# Patient Record
Sex: FEMALE | Race: WHITE | Employment: UNEMPLOYED | URBAN - METROPOLITAN AREA
[De-identification: names, ages, dates, MRNs, and addresses within clinical notes are randomized per-mention and may not be internally consistent; named-entity substitution may affect disease eponyms.]

---

## 2017-03-10 ENCOUNTER — HOSPITAL ENCOUNTER (OUTPATIENT)
Age: 82
Setting detail: OBSERVATION
Discharge: HOME HEALTH | End: 2017-03-13
Attending: EMERGENCY MEDICINE | Admitting: INTERNAL MEDICINE
Payer: MEDICARE

## 2017-03-10 ENCOUNTER — APPOINTMENT (OUTPATIENT)
Dept: GENERAL RADIOLOGY | Age: 82
End: 2017-03-10
Attending: EMERGENCY MEDICINE
Payer: MEDICARE

## 2017-03-10 DIAGNOSIS — R07.9 CHEST PAIN, UNSPECIFIED TYPE: Primary | ICD-10-CM

## 2017-03-10 DIAGNOSIS — I10 ACCELERATED HYPERTENSION: ICD-10-CM

## 2017-03-10 DIAGNOSIS — R73.9 HYPERGLYCEMIA WITHOUT KETOSIS: ICD-10-CM

## 2017-03-10 DIAGNOSIS — Z86.79 HISTORY OF CHF (CONGESTIVE HEART FAILURE): ICD-10-CM

## 2017-03-10 DIAGNOSIS — N28.9 RENAL INSUFFICIENCY: ICD-10-CM

## 2017-03-10 LAB
ALBUMIN SERPL BCP-MCNC: 3.7 G/DL (ref 3.4–5)
ALBUMIN/GLOB SERPL: 1 {RATIO} (ref 0.8–1.7)
ALP SERPL-CCNC: 105 U/L (ref 45–117)
ALT SERPL-CCNC: 20 U/L (ref 13–56)
ANION GAP BLD CALC-SCNC: 10 MMOL/L (ref 3–18)
APPEARANCE UR: CLEAR
APTT PPP: 27.1 SEC (ref 23–36.4)
AST SERPL W P-5'-P-CCNC: 14 U/L (ref 15–37)
BACTERIA URNS QL MICRO: ABNORMAL /HPF
BASOPHILS # BLD AUTO: 0.1 K/UL (ref 0–0.06)
BASOPHILS # BLD: 1 % (ref 0–2)
BILIRUB SERPL-MCNC: 0.3 MG/DL (ref 0.2–1)
BILIRUB UR QL: NEGATIVE
BNP SERPL-MCNC: 684 PG/ML (ref 0–1800)
BUN SERPL-MCNC: 39 MG/DL (ref 7–18)
BUN/CREAT SERPL: 24 (ref 12–20)
CALCIUM SERPL-MCNC: 10.1 MG/DL (ref 8.5–10.1)
CHLORIDE SERPL-SCNC: 98 MMOL/L (ref 100–108)
CK MB CFR SERPL CALC: 1.6 % (ref 0–4)
CK MB SERPL-MCNC: 1.5 NG/ML (ref 5–25)
CK SERPL-CCNC: 94 U/L (ref 26–192)
CO2 SERPL-SCNC: 28 MMOL/L (ref 21–32)
COLOR UR: YELLOW
CREAT SERPL-MCNC: 1.65 MG/DL (ref 0.6–1.3)
DIFFERENTIAL METHOD BLD: ABNORMAL
EOSINOPHIL # BLD: 0.4 K/UL (ref 0–0.4)
EOSINOPHIL NFR BLD: 3 % (ref 0–5)
EPITH CASTS URNS QL MICRO: ABNORMAL /LPF (ref 0–5)
ERYTHROCYTE [DISTWIDTH] IN BLOOD BY AUTOMATED COUNT: 16.6 % (ref 11.6–14.5)
EST. AVERAGE GLUCOSE BLD GHB EST-MCNC: 177 MG/DL
GLOBULIN SER CALC-MCNC: 3.7 G/DL (ref 2–4)
GLUCOSE SERPL-MCNC: 251 MG/DL (ref 74–99)
GLUCOSE UR STRIP.AUTO-MCNC: NEGATIVE MG/DL
HBA1C MFR BLD: 7.8 % (ref 4.5–5.6)
HCT VFR BLD AUTO: 39.1 % (ref 35–45)
HGB BLD-MCNC: 12.5 G/DL (ref 12–16)
HGB UR QL STRIP: NEGATIVE
INR PPP: 0.9 (ref 0.8–1.2)
KETONES UR QL STRIP.AUTO: NEGATIVE MG/DL
LEUKOCYTE ESTERASE UR QL STRIP.AUTO: ABNORMAL
LIPASE SERPL-CCNC: 77 U/L (ref 73–393)
LYMPHOCYTES # BLD AUTO: 14 % (ref 21–52)
LYMPHOCYTES # BLD: 1.9 K/UL (ref 0.9–3.6)
MCH RBC QN AUTO: 24.3 PG (ref 24–34)
MCHC RBC AUTO-ENTMCNC: 32 G/DL (ref 31–37)
MCV RBC AUTO: 75.9 FL (ref 74–97)
MONOCYTES # BLD: 1.2 K/UL (ref 0.05–1.2)
MONOCYTES NFR BLD AUTO: 8 % (ref 3–10)
MUCOUS THREADS URNS QL MICRO: NEGATIVE /LPF
NEUTS SEG # BLD: 10.5 K/UL (ref 1.8–8)
NEUTS SEG NFR BLD AUTO: 74 % (ref 40–73)
NITRITE UR QL STRIP.AUTO: NEGATIVE
PH UR STRIP: 6 [PH] (ref 5–8)
PLATELET # BLD AUTO: 562 K/UL (ref 135–420)
PMV BLD AUTO: 9.3 FL (ref 9.2–11.8)
POTASSIUM SERPL-SCNC: 5 MMOL/L (ref 3.5–5.5)
PROT SERPL-MCNC: 7.4 G/DL (ref 6.4–8.2)
PROT UR STRIP-MCNC: 30 MG/DL
PROTHROMBIN TIME: 12 SEC (ref 11.5–15.2)
RBC # BLD AUTO: 5.15 M/UL (ref 4.2–5.3)
RBC #/AREA URNS HPF: NEGATIVE /HPF (ref 0–5)
SODIUM SERPL-SCNC: 136 MMOL/L (ref 136–145)
SP GR UR REFRACTOMETRY: 1.01 (ref 1–1.03)
TROPONIN I SERPL-MCNC: <0.02 NG/ML (ref 0–0.06)
UROBILINOGEN UR QL STRIP.AUTO: 0.2 EU/DL (ref 0.2–1)
WBC # BLD AUTO: 14 K/UL (ref 4.6–13.2)
WBC URNS QL MICRO: ABNORMAL /HPF (ref 0–5)

## 2017-03-10 PROCEDURE — 83690 ASSAY OF LIPASE: CPT | Performed by: EMERGENCY MEDICINE

## 2017-03-10 PROCEDURE — 83036 HEMOGLOBIN GLYCOSYLATED A1C: CPT | Performed by: PHYSICIAN ASSISTANT

## 2017-03-10 PROCEDURE — 74011250637 HC RX REV CODE- 250/637: Performed by: PHYSICIAN ASSISTANT

## 2017-03-10 PROCEDURE — 96375 TX/PRO/DX INJ NEW DRUG ADDON: CPT

## 2017-03-10 PROCEDURE — 85610 PROTHROMBIN TIME: CPT | Performed by: EMERGENCY MEDICINE

## 2017-03-10 PROCEDURE — 93005 ELECTROCARDIOGRAM TRACING: CPT

## 2017-03-10 PROCEDURE — 96372 THER/PROPH/DIAG INJ SC/IM: CPT

## 2017-03-10 PROCEDURE — 94640 AIRWAY INHALATION TREATMENT: CPT

## 2017-03-10 PROCEDURE — 80053 COMPREHEN METABOLIC PANEL: CPT | Performed by: EMERGENCY MEDICINE

## 2017-03-10 PROCEDURE — 74011000250 HC RX REV CODE- 250: Performed by: PHYSICIAN ASSISTANT

## 2017-03-10 PROCEDURE — 96365 THER/PROPH/DIAG IV INF INIT: CPT

## 2017-03-10 PROCEDURE — 82550 ASSAY OF CK (CPK): CPT | Performed by: EMERGENCY MEDICINE

## 2017-03-10 PROCEDURE — 85730 THROMBOPLASTIN TIME PARTIAL: CPT | Performed by: EMERGENCY MEDICINE

## 2017-03-10 PROCEDURE — 85025 COMPLETE CBC W/AUTO DIFF WBC: CPT | Performed by: EMERGENCY MEDICINE

## 2017-03-10 PROCEDURE — 99285 EMERGENCY DEPT VISIT HI MDM: CPT

## 2017-03-10 PROCEDURE — 83880 ASSAY OF NATRIURETIC PEPTIDE: CPT | Performed by: EMERGENCY MEDICINE

## 2017-03-10 PROCEDURE — 81001 URINALYSIS AUTO W/SCOPE: CPT | Performed by: PHYSICIAN ASSISTANT

## 2017-03-10 PROCEDURE — 71010 XR CHEST PORT: CPT

## 2017-03-10 PROCEDURE — 96376 TX/PRO/DX INJ SAME DRUG ADON: CPT

## 2017-03-10 RX ORDER — MICONAZOLE NITRATE 2 %
1 CREAM WITH APPLICATOR VAGINAL
COMMUNITY

## 2017-03-10 RX ORDER — DIPHENHYDRAMINE HCL 25 MG
25 TABLET ORAL
COMMUNITY
End: 2017-09-20

## 2017-03-10 RX ORDER — LOPERAMIDE HCL 2 MG
2 TABLET ORAL DAILY
COMMUNITY
End: 2017-09-20

## 2017-03-10 RX ORDER — LANOLIN ALCOHOL/MO/W.PET/CERES
3 CREAM (GRAM) TOPICAL
COMMUNITY

## 2017-03-10 RX ORDER — CLOTRIMAZOLE AND BETAMETHASONE DIPROPIONATE 10; .5 MG/ML; MG/ML
LOTION TOPICAL 2 TIMES DAILY
COMMUNITY

## 2017-03-10 RX ORDER — HYDRALAZINE HYDROCHLORIDE 20 MG/ML
10 INJECTION INTRAMUSCULAR; INTRAVENOUS
Status: DISCONTINUED | OUTPATIENT
Start: 2017-03-10 | End: 2017-03-10 | Stop reason: CLARIF

## 2017-03-10 RX ORDER — CALCIUM CARBONATE 200(500)MG
2 TABLET,CHEWABLE ORAL 3 TIMES DAILY
COMMUNITY

## 2017-03-10 RX ORDER — IPRATROPIUM BROMIDE AND ALBUTEROL SULFATE 2.5; .5 MG/3ML; MG/3ML
3 SOLUTION RESPIRATORY (INHALATION)
Status: COMPLETED | OUTPATIENT
Start: 2017-03-10 | End: 2017-03-10

## 2017-03-10 RX ADMIN — NITROGLYCERIN 1 INCH: 20 OINTMENT TOPICAL at 23:53

## 2017-03-10 RX ADMIN — IPRATROPIUM BROMIDE AND ALBUTEROL SULFATE 3 ML: .5; 3 SOLUTION RESPIRATORY (INHALATION) at 23:53

## 2017-03-10 NOTE — IP AVS SNAPSHOT
[facility] 
 
 
 [address] [address] 
[phone] Patient: Sonny Loja MRN: [id number] PFO:[id number] You are allergic to the following No active allergies Recent Documentation Height Weight Breastfeeding? BMI OB Status Smoking Status 1.6 m 88.5 kg No 34.54 kg/m2 Hysterectomy Former Smoker Emergency Contacts Name Discharge Info Relation Home Work Mobile Elyssa Mata N/A  AT THIS TIME [6] Other Relative [6] [phone] About your hospitalization You were admitted on:  March 11, 2017 You last received care in the:  [address] [place] [address] You were discharged on:  March 13, 2017 Unit phone number:  [phone] Why you were hospitalized Your primary diagnosis was:  Not on File Your diagnoses also included:  Chest Pain, Unspecified, Shortness Of Breath, Angina Pectoris (Hcc), Cad (Coronary Artery Disease), Htn (Hypertension), Chf (Congestive Heart Failure) (Hcc), Hypertensive Urgency, Dm (Diabetes Mellitus) (Hcc), Elevated Wbc Count, Ckd (Chronic Kidney Disease) Stage 3, Gfr 30-59 Ml/Min, Ckd (Chronic Kidney Disease) Stage 4, Gfr 15-29 Ml/Min (Hcc) Providers Seen During Your Hospitalizations Provider Role Specialty Primary office phone Robi Herrera MD Attending Provider Emergency Medicine [phone] Chato Galarza MD Attending Provider Internal Medicine [phone] Your Primary Care Physician (PCP) Primary Care Physician Office Phone Office Fax Saman Doom [phone] Follow-up Information Follow up With Details Comments Contact Info Nephrologist Schedule an appointment as soon as possible for a visit in 1 week Sudhir Patel MD In 3 days review renal function and use of diovan again please [address] [facility] [address] [facility] [address] [address] [phone] [place] of [address] [place] [address] Living to follow-up with physician appts. [address]  [facility] [address] [phone] [facility] Current Discharge Medication List  
  
CONTINUE these medications which have CHANGED Dose & Instructions Dispensing Information Comments Morning Noon Evening Bedtime  
 cloNIDine HCl 0.2 mg tablet Commonly known as:  CATAPRES What changed:  when to take this Dose:  0.2 mg Take 1 Tab by mouth two (2) times a day. Quantity:  40 Tab Refills:  0  
     
   
   
  
   
  
 metoprolol tartrate 50 mg tablet Commonly known as:  LOPRESSOR What changed:  how much to take Dose:  25 mg Take 0.5 Tabs by mouth two (2) times a day. Quantity:  10 Tab Refills:  0 NOVOLOG SC What changed:  Another medication with the same name was removed. Continue taking this medication, and follow the directions you see here. Dose:  12 Units 12 Units by SubCUTAneous route three (3) times daily (with meals). Indications: breakfast and lunch Refills:  0 CONTINUE these medications which have NOT CHANGED Dose & Instructions Dispensing Information Comments Morning Noon Evening Bedtime  
 acetaminophen 325 mg tablet Commonly known as:  TYLENOL Dose:  650 mg Take 650 mg by mouth three (3) times daily as needed for Pain. Refills:  0  
     
   
   
   
  
 aspirin 81 mg tablet Dose:  81 mg Take 81 mg by mouth daily. Refills:  0  
     
   
   
   
  
 HODAN-GEST ANTACID 200 mg calcium (500 mg) Chew Generic drug:  calcium carbonate Dose:  2 Tab Take 2 Tabs by mouth three (3) times daily. Refills:  0  
     
   
   
   
  
 clotrimazole-betamethasone 1-0.05 % lotion Commonly known as:  Randi Lancaster Apply  to affected area two (2) times a day. Refills:  0 diphenhydrAMINE 25 mg tablet Commonly known as:  BENADRYL Dose:  25 mg Take 25 mg by mouth every six (6) hours as needed for Sleep. Refills:  0  
     
   
   
   
  
 furosemide 40 mg tablet Commonly known as:  LASIX Dose:  20 mg Take 0.5 Tabs by mouth daily. Quantity:  1 Tab Refills:  0 IMDUR 30 mg tablet Generic drug:  isosorbide mononitrate ER Dose:  30 mg Take 30 mg by mouth two (2) times a day. Refills:  0  
     
   
   
   
  
 LANTUS SC Dose:  55 Units 55 Units by SubCUTAneous route as directed. Refills:  0  
     
   
   
   
  
 levothyroxine 125 mcg tablet Commonly known as:  SYNTHROID Dose:  150 mcg Take 150 mcg by mouth Daily (before breakfast). Refills:  0 LIPITOR 20 mg tablet Generic drug:  atorvastatin Take  by mouth daily. Refills:  0  
     
   
   
   
  
 loperamide 2 mg tablet Commonly known as:  IMMODIUM Dose:  2 mg Take 2 mg by mouth daily. Refills:  0 LORazepam 0.5 mg tablet Commonly known as:  ATIVAN Dose:  0.5 mg Take 1 Tab by mouth every eight (8) hours as needed for Anxiety. Max Daily Amount: 1.5 mg.  
 Quantity:  20 Tab Refills:  0  
     
   
   
   
  
 magnesium oxide 400 mg tablet Commonly known as:  MAG-OX Dose:  400 mg Take 400 mg by mouth daily. Refills:  0  
     
   
   
   
  
 meclizine 12.5 mg tablet Commonly known as:  ANTIVERT Dose:  12.5 mg Take 12.5 mg by mouth four (4) times daily as needed. Refills:  0  
     
   
   
   
  
 melatonin 3 mg tablet Dose:  3 mg Take 3 mg by mouth. Refills:  0 MICONAZOLE 7 2 % vaginal cream  
Generic drug:  miconazole Dose:  1 Applicator Insert 1 Applicator into vagina nightly. Refills:  0 NAPHCON-A 0.025-0.3 % ophthalmic solution Generic drug:  naphazoline-pheniramine Administer  to both eyes. Refills:  0  
     
   
   
   
  
 NITROQUICK 0.4 mg SL tablet Generic drug:  nitroglycerin Dose:  0.4 mg  
0.4 mg by SubLINGual route every five (5) minutes as needed. Refills:  0 NORVASC 5 mg tablet Generic drug:  amLODIPine Dose:  10 mg Take 10 mg by mouth daily. Refills:  0  
     
   
   
   
  
 pitavastatin 2 mg tablet Commonly known as:  LIVALO Dose:  1 mg Take 1 mg by mouth daily. Refills:  0 PLAVIX 75 mg Tab Generic drug:  clopidogrel Dose:  75 mg Take 75 mg by mouth daily. Refills:  0  
     
   
   
   
  
 polyethylene glycol 17 gram/dose powder Commonly known as:  Duquesne Hallmark Dose:  17 g Take 17 g by mouth as needed. Refills:  0  
     
   
   
   
  
 promethazine 6.25 mg/5 mL syrup Commonly known as:  PHENERGAN Dose:  6.25 mg Take 6.25 mg by mouth four (4) times daily as needed for Nausea. Refills:  0 PROTONIX 40 mg tablet Generic drug:  pantoprazole Dose:  40 mg Take 40 mg by mouth daily. Refills:  0  
     
   
   
   
  
 traMADol 50 mg tablet Commonly known as:  ULTRAM  
   
 Dose:  50 mg Take 50 mg by mouth every six (6) hours as needed for Pain. Refills:  0  
     
   
   
   
  
 VITAMIN B-6 50 mg tablet Generic drug:  pyridoxine (vitamin B6) Dose:  50 mg Take 50 mg by mouth daily. Refills:  0  
     
   
   
   
  
 VITAMIN D3 1,000 unit tablet Generic drug:  cholecalciferol Dose:  1000 Units Take 1,000 Units by mouth daily. Refills:  0 STOP taking these medications   
 hydroCHLOROthiazide 25 mg tablet Commonly known as:  HYDRODIURIL  
   
  
 insulin lispro 100 unit/mL injection Commonly known as:  HUMALOG  
   
  
 potassium chloride 20 mEq tablet Commonly known as:  K-DUR, BERNARDOR-CON  
   
 valsartan 320 mg tablet Commonly known as:  DIOVAN Where to Get Your Medications Information on where to get these meds will be given to you by the nurse or doctor. ! Ask your nurse or doctor about these medications  
  furosemide 40 mg tablet  
 metoprolol tartrate 50 mg tablet Discharge Instructions DISCHARGE SUMMARY from Nurse The following personal items are in your possession at time of discharge: 
 
Dental Appliances: Uppers Visual Aid: None Home Medications: None Jewelry: None Clothing: At bedside Other Valuables:  (bag) PATIENT INSTRUCTIONS: 
 
 
F-face looks uneven A-arms unable to move or move unevenly S-speech slurred or non-existent T-time-call 911 as soon as signs and symptoms begin-DO NOT go Back to bed or wait to see if you get better-TIME IS BRAIN. Warning Signs of HEART ATTACK Call 911 if you have these symptoms: 
? Chest discomfort. Most heart attacks involve discomfort in the center of the chest that lasts more than a few minutes, or that goes away and comes back. It can feel like uncomfortable pressure, squeezing, fullness, or pain. ? Discomfort in other areas of the upper body. Symptoms can include pain or discomfort in one or both arms, the back, neck, jaw, or stomach. ? Shortness of breath with or without chest discomfort. ? Other signs may include breaking out in a cold sweat, nausea, or lightheadedness. Don't wait more than five minutes to call [address]! Fast action can save your life. Calling 911 is almost always the fastest way to get lifesaving treatment. Emergency Medical Services staff can begin treatment when they arrive  up to an hour sooner than if someone gets to the hospital by car. The discharge information has been reviewed with the patient.   The patient verbalized understanding. Discharge medications reviewed with the patient and appropriate educational materials and side effects teaching were provided. Patient armband removed and shredded Discharge Orders None [employer] Announcement We are excited to announce that we are making your provider's discharge notes available to you in [employer]. You will see these notes when they are completed and signed by the physician that discharged you from your recent hospital stay. If you have any questions or concerns about any information you see in [employer], please call the Health Information Department where you were seen or reach out to your Primary Care Provider for more information about your plan of care. Introducing \[facility] [place] [facility]\"" & HEALTH SERVICES! Bobbi Canas introduces [employer] patient portal. Now you can access parts of your medical record, email your doctor's office, and request medication refills online. 1. In your internet browser, go to [email or web address] 2. Click on the First Time User? Click Here link in the Sign In box. You will see the New Member Sign Up page. 3. Enter your [employer] Access Code exactly as it appears below. You will not need to use this code after youve completed the sign-up process. If you do not sign up before the expiration date, you must request a new code. · [employer] Access Code: [email or web address] Expires: 6/8/2017 11:42 PM 
 
4. Enter the last four digits of your Social Security Number (xxxx) and Date of Birth (mm/dd/yyyy) as indicated and click Submit. You will be taken to the next sign-up page. 5. Create a AquaB[employer] ID. This will be your [employer] login ID and cannot be changed, so think of one that is secure and easy to remember. 6. Create a [employer] password. You can change your password at any time. 7. Enter your Password Reset Question and Answer. This can be used at a later time if you forget your password. 8. Enter your e-mail address. You will receive e-mail notification when new information is available in [address] 9. Click Sign Up. You can now view and download portions of your medical record. 10. Click the Download Summary menu link to download a portable copy of your medical information. If you have questions, please visit the Frequently Asked Questions section of the [employer] website. Remember, [employer] is NOT to be used for urgent needs. For medical emergencies, dial 911. Now available from your iPhone and Android! General Information Please provide this summary of care documentation to your next provider. Patient Signature:  ____________________________________________________________ Date:  ____________________________________________________________  
  
Calixto Marcie Provider Signature:  ____________________________________________________________ Date:  ____________________________________________________________

## 2017-03-11 PROBLEM — N18.30 CKD (CHRONIC KIDNEY DISEASE) STAGE 3, GFR 30-59 ML/MIN (HCC): Status: ACTIVE | Noted: 2017-03-11

## 2017-03-11 LAB
CK MB CFR SERPL CALC: 1 % (ref 0–4)
CK MB CFR SERPL CALC: 1.1 % (ref 0–4)
CK MB CFR SERPL CALC: 1.6 % (ref 0–4)
CK MB SERPL-MCNC: 1.4 NG/ML (ref 5–25)
CK MB SERPL-MCNC: 1.6 NG/ML (ref 5–25)
CK MB SERPL-MCNC: 1.9 NG/ML (ref 5–25)
CK SERPL-CCNC: 102 U/L (ref 26–192)
CK SERPL-CCNC: 128 U/L (ref 26–192)
CK SERPL-CCNC: 183 U/L (ref 26–192)
GLUCOSE BLD STRIP.AUTO-MCNC: 134 MG/DL (ref 70–110)
GLUCOSE BLD STRIP.AUTO-MCNC: 154 MG/DL (ref 70–110)
GLUCOSE BLD STRIP.AUTO-MCNC: 183 MG/DL (ref 70–110)
GLUCOSE BLD STRIP.AUTO-MCNC: 213 MG/DL (ref 70–110)
TROPONIN I SERPL-MCNC: 0.02 NG/ML (ref 0–0.06)
TROPONIN I SERPL-MCNC: 0.03 NG/ML (ref 0–0.06)
TROPONIN I SERPL-MCNC: 0.12 NG/ML (ref 0–0.06)

## 2017-03-11 PROCEDURE — 74011250636 HC RX REV CODE- 250/636: Performed by: PHYSICIAN ASSISTANT

## 2017-03-11 PROCEDURE — 77030013140 HC MSK NEB VYRM -A

## 2017-03-11 PROCEDURE — 96376 TX/PRO/DX INJ SAME DRUG ADON: CPT

## 2017-03-11 PROCEDURE — 74011250636 HC RX REV CODE- 250/636: Performed by: INTERNAL MEDICINE

## 2017-03-11 PROCEDURE — 99285 EMERGENCY DEPT VISIT HI MDM: CPT

## 2017-03-11 PROCEDURE — 51798 US URINE CAPACITY MEASURE: CPT

## 2017-03-11 PROCEDURE — 84443 ASSAY THYROID STIM HORMONE: CPT | Performed by: HOSPITALIST

## 2017-03-11 PROCEDURE — 96372 THER/PROPH/DIAG INJ SC/IM: CPT

## 2017-03-11 PROCEDURE — 74011250636 HC RX REV CODE- 250/636: Performed by: HOSPITALIST

## 2017-03-11 PROCEDURE — 93005 ELECTROCARDIOGRAM TRACING: CPT

## 2017-03-11 PROCEDURE — 99218 HC RM OBSERVATION: CPT

## 2017-03-11 PROCEDURE — 74011000250 HC RX REV CODE- 250: Performed by: PHYSICIAN ASSISTANT

## 2017-03-11 PROCEDURE — 93306 TTE W/DOPPLER COMPLETE: CPT

## 2017-03-11 PROCEDURE — 96365 THER/PROPH/DIAG IV INF INIT: CPT

## 2017-03-11 PROCEDURE — 74011250637 HC RX REV CODE- 250/637: Performed by: INTERNAL MEDICINE

## 2017-03-11 PROCEDURE — 36415 COLL VENOUS BLD VENIPUNCTURE: CPT | Performed by: INTERNAL MEDICINE

## 2017-03-11 PROCEDURE — 96375 TX/PRO/DX INJ NEW DRUG ADDON: CPT

## 2017-03-11 PROCEDURE — 82550 ASSAY OF CK (CPK): CPT | Performed by: PHYSICIAN ASSISTANT

## 2017-03-11 PROCEDURE — 74011000258 HC RX REV CODE- 258: Performed by: HOSPITALIST

## 2017-03-11 PROCEDURE — 82962 GLUCOSE BLOOD TEST: CPT

## 2017-03-11 PROCEDURE — C9113 INJ PANTOPRAZOLE SODIUM, VIA: HCPCS | Performed by: PHYSICIAN ASSISTANT

## 2017-03-11 RX ORDER — INSULIN ASPART 100 [IU]/ML
15 INJECTION, SOLUTION INTRAVENOUS; SUBCUTANEOUS
Status: DISCONTINUED | OUTPATIENT
Start: 2017-03-11 | End: 2017-03-11

## 2017-03-11 RX ORDER — LANOLIN ALCOHOL/MO/W.PET/CERES
50 CREAM (GRAM) TOPICAL DAILY
Status: DISCONTINUED | OUTPATIENT
Start: 2017-03-11 | End: 2017-03-13 | Stop reason: HOSPADM

## 2017-03-11 RX ORDER — POLYETHYLENE GLYCOL 3350 17 G/17G
17 POWDER, FOR SOLUTION ORAL DAILY
Status: DISCONTINUED | OUTPATIENT
Start: 2017-03-11 | End: 2017-03-13 | Stop reason: HOSPADM

## 2017-03-11 RX ORDER — POTASSIUM CHLORIDE 20 MEQ/1
20 TABLET, EXTENDED RELEASE ORAL 2 TIMES DAILY
Status: DISCONTINUED | OUTPATIENT
Start: 2017-03-11 | End: 2017-03-12

## 2017-03-11 RX ORDER — DEXTROSE 50 % IN WATER (D50W) INTRAVENOUS SYRINGE
25-50 AS NEEDED
Status: DISCONTINUED | OUTPATIENT
Start: 2017-03-11 | End: 2017-03-13 | Stop reason: HOSPADM

## 2017-03-11 RX ORDER — PROCHLORPERAZINE EDISYLATE 5 MG/ML
5 INJECTION INTRAMUSCULAR; INTRAVENOUS
Status: DISCONTINUED | OUTPATIENT
Start: 2017-03-11 | End: 2017-03-13 | Stop reason: HOSPADM

## 2017-03-11 RX ORDER — MAGNESIUM SULFATE 100 %
16 CRYSTALS MISCELLANEOUS AS NEEDED
Status: DISCONTINUED | OUTPATIENT
Start: 2017-03-11 | End: 2017-03-13 | Stop reason: HOSPADM

## 2017-03-11 RX ORDER — LANOLIN ALCOHOL/MO/W.PET/CERES
400 CREAM (GRAM) TOPICAL DAILY
Status: DISCONTINUED | OUTPATIENT
Start: 2017-03-11 | End: 2017-03-13 | Stop reason: HOSPADM

## 2017-03-11 RX ORDER — MECLIZINE HCL 12.5 MG 12.5 MG/1
12.5 TABLET ORAL
Status: DISCONTINUED | OUTPATIENT
Start: 2017-03-11 | End: 2017-03-13 | Stop reason: HOSPADM

## 2017-03-11 RX ORDER — LORAZEPAM 0.5 MG/1
0.5 TABLET ORAL
Status: DISCONTINUED | OUTPATIENT
Start: 2017-03-11 | End: 2017-03-11

## 2017-03-11 RX ORDER — LEVOTHYROXINE SODIUM 150 UG/1
150 TABLET ORAL
Status: DISCONTINUED | OUTPATIENT
Start: 2017-03-11 | End: 2017-03-13 | Stop reason: HOSPADM

## 2017-03-11 RX ORDER — DIPHENHYDRAMINE HCL 25 MG
25 CAPSULE ORAL
Status: DISCONTINUED | OUTPATIENT
Start: 2017-03-11 | End: 2017-03-13 | Stop reason: HOSPADM

## 2017-03-11 RX ORDER — INSULIN LISPRO 100 [IU]/ML
12 INJECTION, SOLUTION INTRAVENOUS; SUBCUTANEOUS 3 TIMES DAILY
Status: DISCONTINUED | OUTPATIENT
Start: 2017-03-11 | End: 2017-03-11

## 2017-03-11 RX ORDER — VALSARTAN 160 MG/1
320 TABLET ORAL DAILY
Status: DISCONTINUED | OUTPATIENT
Start: 2017-03-11 | End: 2017-03-12

## 2017-03-11 RX ORDER — LANOLIN ALCOHOL/MO/W.PET/CERES
3 CREAM (GRAM) TOPICAL
Status: DISCONTINUED | OUTPATIENT
Start: 2017-03-11 | End: 2017-03-11

## 2017-03-11 RX ORDER — PANTOPRAZOLE SODIUM 40 MG/10ML
40 INJECTION, POWDER, LYOPHILIZED, FOR SOLUTION INTRAVENOUS
Status: COMPLETED | OUTPATIENT
Start: 2017-03-11 | End: 2017-03-11

## 2017-03-11 RX ORDER — HYDRALAZINE HYDROCHLORIDE 20 MG/ML
20 INJECTION INTRAMUSCULAR; INTRAVENOUS
Status: COMPLETED | OUTPATIENT
Start: 2017-03-11 | End: 2017-03-11

## 2017-03-11 RX ORDER — LOPERAMIDE HYDROCHLORIDE 2 MG/1
2 CAPSULE ORAL
Status: DISCONTINUED | OUTPATIENT
Start: 2017-03-11 | End: 2017-03-13 | Stop reason: HOSPADM

## 2017-03-11 RX ORDER — CLOTRIMAZOLE AND BETAMETHASONE DIPROPIONATE 10; .64 MG/G; MG/G
CREAM TOPICAL 2 TIMES DAILY
Status: DISCONTINUED | OUTPATIENT
Start: 2017-03-11 | End: 2017-03-13 | Stop reason: HOSPADM

## 2017-03-11 RX ORDER — GUAIFENESIN 100 MG/5ML
81 LIQUID (ML) ORAL DAILY
Status: DISCONTINUED | OUTPATIENT
Start: 2017-03-11 | End: 2017-03-13 | Stop reason: HOSPADM

## 2017-03-11 RX ORDER — DOCUSATE SODIUM 100 MG/1
100 CAPSULE, LIQUID FILLED ORAL 2 TIMES DAILY
Status: DISCONTINUED | OUTPATIENT
Start: 2017-03-11 | End: 2017-03-12

## 2017-03-11 RX ORDER — CLOPIDOGREL BISULFATE 75 MG/1
75 TABLET ORAL DAILY
Status: DISCONTINUED | OUTPATIENT
Start: 2017-03-11 | End: 2017-03-13 | Stop reason: HOSPADM

## 2017-03-11 RX ORDER — HYDROCHLOROTHIAZIDE 25 MG/1
25 TABLET ORAL DAILY
Status: DISCONTINUED | OUTPATIENT
Start: 2017-03-11 | End: 2017-03-12

## 2017-03-11 RX ORDER — LABETALOL HYDROCHLORIDE 5 MG/ML
20 INJECTION, SOLUTION INTRAVENOUS
Status: COMPLETED | OUTPATIENT
Start: 2017-03-11 | End: 2017-03-11

## 2017-03-11 RX ORDER — INSULIN LISPRO 100 [IU]/ML
15 INJECTION, SOLUTION INTRAVENOUS; SUBCUTANEOUS
Status: DISCONTINUED | OUTPATIENT
Start: 2017-03-11 | End: 2017-03-13 | Stop reason: HOSPADM

## 2017-03-11 RX ORDER — FUROSEMIDE 10 MG/ML
40 INJECTION INTRAMUSCULAR; INTRAVENOUS DAILY
Status: DISCONTINUED | OUTPATIENT
Start: 2017-03-11 | End: 2017-03-12

## 2017-03-11 RX ORDER — HYDROMORPHONE HYDROCHLORIDE 1 MG/ML
0.5 INJECTION, SOLUTION INTRAMUSCULAR; INTRAVENOUS; SUBCUTANEOUS
Status: COMPLETED | OUTPATIENT
Start: 2017-03-11 | End: 2017-03-11

## 2017-03-11 RX ORDER — INSULIN GLARGINE 100 [IU]/ML
55 INJECTION, SOLUTION SUBCUTANEOUS
Status: DISCONTINUED | OUTPATIENT
Start: 2017-03-11 | End: 2017-03-13 | Stop reason: HOSPADM

## 2017-03-11 RX ORDER — TRAMADOL HYDROCHLORIDE 50 MG/1
50 TABLET ORAL
Status: DISCONTINUED | OUTPATIENT
Start: 2017-03-11 | End: 2017-03-13 | Stop reason: HOSPADM

## 2017-03-11 RX ORDER — ONDANSETRON 2 MG/ML
4 INJECTION INTRAMUSCULAR; INTRAVENOUS
Status: COMPLETED | OUTPATIENT
Start: 2017-03-11 | End: 2017-03-11

## 2017-03-11 RX ORDER — PANTOPRAZOLE SODIUM 40 MG/1
40 TABLET, DELAYED RELEASE ORAL DAILY
Status: DISCONTINUED | OUTPATIENT
Start: 2017-03-11 | End: 2017-03-13 | Stop reason: HOSPADM

## 2017-03-11 RX ORDER — ATORVASTATIN CALCIUM 20 MG/1
20 TABLET, FILM COATED ORAL DAILY
Status: DISCONTINUED | OUTPATIENT
Start: 2017-03-11 | End: 2017-03-13 | Stop reason: HOSPADM

## 2017-03-11 RX ORDER — CLONIDINE HYDROCHLORIDE 0.1 MG/1
0.2 TABLET ORAL 2 TIMES DAILY
Status: DISCONTINUED | OUTPATIENT
Start: 2017-03-11 | End: 2017-03-13 | Stop reason: HOSPADM

## 2017-03-11 RX ORDER — CALCIUM CARBONATE 200(500)MG
400 TABLET,CHEWABLE ORAL 3 TIMES DAILY
Status: DISCONTINUED | OUTPATIENT
Start: 2017-03-11 | End: 2017-03-13 | Stop reason: HOSPADM

## 2017-03-11 RX ORDER — HEPARIN SODIUM 5000 [USP'U]/ML
5000 INJECTION, SOLUTION INTRAVENOUS; SUBCUTANEOUS EVERY 8 HOURS
Status: DISCONTINUED | OUTPATIENT
Start: 2017-03-11 | End: 2017-03-13 | Stop reason: HOSPADM

## 2017-03-11 RX ORDER — FUROSEMIDE 10 MG/ML
40 INJECTION INTRAMUSCULAR; INTRAVENOUS
Status: COMPLETED | OUTPATIENT
Start: 2017-03-11 | End: 2017-03-11

## 2017-03-11 RX ORDER — METOPROLOL TARTRATE 50 MG/1
50 TABLET ORAL 2 TIMES DAILY
Status: DISCONTINUED | OUTPATIENT
Start: 2017-03-11 | End: 2017-03-12

## 2017-03-11 RX ORDER — MORPHINE SULFATE 4 MG/ML
4 INJECTION, SOLUTION INTRAMUSCULAR; INTRAVENOUS
Status: COMPLETED | OUTPATIENT
Start: 2017-03-11 | End: 2017-03-11

## 2017-03-11 RX ORDER — INSULIN GLARGINE 100 [IU]/ML
55 INJECTION, SOLUTION SUBCUTANEOUS EVERY EVENING
Status: DISCONTINUED | OUTPATIENT
Start: 2017-03-11 | End: 2017-03-11

## 2017-03-11 RX ORDER — PROMETHAZINE HYDROCHLORIDE 25 MG/1
6.25 TABLET ORAL
Status: DISCONTINUED | OUTPATIENT
Start: 2017-03-11 | End: 2017-03-13 | Stop reason: HOSPADM

## 2017-03-11 RX ORDER — MICONAZOLE NITRATE 2 %
1 CREAM WITH APPLICATOR VAGINAL
Status: DISCONTINUED | OUTPATIENT
Start: 2017-03-11 | End: 2017-03-12

## 2017-03-11 RX ORDER — AMLODIPINE BESYLATE 5 MG/1
10 TABLET ORAL DAILY
Status: DISCONTINUED | OUTPATIENT
Start: 2017-03-11 | End: 2017-03-13 | Stop reason: HOSPADM

## 2017-03-11 RX ORDER — NITROGLYCERIN 0.4 MG/1
0.4 TABLET SUBLINGUAL AS NEEDED
Status: DISCONTINUED | OUTPATIENT
Start: 2017-03-11 | End: 2017-03-13 | Stop reason: HOSPADM

## 2017-03-11 RX ORDER — PROMETHAZINE HYDROCHLORIDE 6.25 MG/5ML
6.25 SYRUP ORAL
Status: DISCONTINUED | OUTPATIENT
Start: 2017-03-11 | End: 2017-03-11 | Stop reason: CLARIF

## 2017-03-11 RX ORDER — NALOXONE HYDROCHLORIDE 0.4 MG/ML
0.4 INJECTION, SOLUTION INTRAMUSCULAR; INTRAVENOUS; SUBCUTANEOUS AS NEEDED
Status: DISCONTINUED | OUTPATIENT
Start: 2017-03-11 | End: 2017-03-13 | Stop reason: HOSPADM

## 2017-03-11 RX ORDER — FUROSEMIDE 40 MG/1
40 TABLET ORAL DAILY
Status: DISCONTINUED | OUTPATIENT
Start: 2017-03-11 | End: 2017-03-11

## 2017-03-11 RX ORDER — LORAZEPAM 0.5 MG/1
0.5 TABLET ORAL EVERY 8 HOURS
Status: DISCONTINUED | OUTPATIENT
Start: 2017-03-12 | End: 2017-03-13 | Stop reason: HOSPADM

## 2017-03-11 RX ORDER — MELATONIN
1000 DAILY
Status: DISCONTINUED | OUTPATIENT
Start: 2017-03-11 | End: 2017-03-13 | Stop reason: HOSPADM

## 2017-03-11 RX ORDER — ISOSORBIDE MONONITRATE 30 MG/1
30 TABLET, EXTENDED RELEASE ORAL 2 TIMES DAILY
Status: DISCONTINUED | OUTPATIENT
Start: 2017-03-11 | End: 2017-03-13 | Stop reason: HOSPADM

## 2017-03-11 RX ORDER — INSULIN LISPRO 100 [IU]/ML
INJECTION, SOLUTION INTRAVENOUS; SUBCUTANEOUS
Status: DISCONTINUED | OUTPATIENT
Start: 2017-03-11 | End: 2017-03-13 | Stop reason: HOSPADM

## 2017-03-11 RX ADMIN — AMLODIPINE BESYLATE 10 MG: 5 TABLET ORAL at 08:58

## 2017-03-11 RX ADMIN — DOCUSATE SODIUM 100 MG: 100 CAPSULE, LIQUID FILLED ORAL at 08:58

## 2017-03-11 RX ADMIN — Medication 1000 UNITS: at 09:00

## 2017-03-11 RX ADMIN — INSULIN GLARGINE 55 UNITS: 100 INJECTION, SOLUTION SUBCUTANEOUS at 22:13

## 2017-03-11 RX ADMIN — INSULIN LISPRO 15 UNITS: 100 INJECTION, SOLUTION INTRAVENOUS; SUBCUTANEOUS at 17:51

## 2017-03-11 RX ADMIN — INSULIN LISPRO 15 UNITS: 100 INJECTION, SOLUTION INTRAVENOUS; SUBCUTANEOUS at 08:58

## 2017-03-11 RX ADMIN — INSULIN LISPRO 2 UNITS: 100 INJECTION, SOLUTION INTRAVENOUS; SUBCUTANEOUS at 22:12

## 2017-03-11 RX ADMIN — HEPARIN SODIUM 5000 UNITS: 5000 INJECTION, SOLUTION INTRAVENOUS; SUBCUTANEOUS at 04:04

## 2017-03-11 RX ADMIN — HEPARIN SODIUM 5000 UNITS: 5000 INJECTION, SOLUTION INTRAVENOUS; SUBCUTANEOUS at 12:24

## 2017-03-11 RX ADMIN — LORAZEPAM 0.5 MG: 1 TABLET ORAL at 19:05

## 2017-03-11 RX ADMIN — ASPIRIN 81 MG 81 MG: 81 TABLET ORAL at 08:58

## 2017-03-11 RX ADMIN — METOPROLOL TARTRATE 50 MG: 50 TABLET ORAL at 08:58

## 2017-03-11 RX ADMIN — HYDRALAZINE HYDROCHLORIDE 20 MG: 20 INJECTION INTRAMUSCULAR; INTRAVENOUS at 00:21

## 2017-03-11 RX ADMIN — LABETALOL HYDROCHLORIDE 20 MG: 5 INJECTION, SOLUTION INTRAVENOUS at 01:07

## 2017-03-11 RX ADMIN — ISOSORBIDE MONONITRATE 30 MG: 30 TABLET, EXTENDED RELEASE ORAL at 22:12

## 2017-03-11 RX ADMIN — ISOSORBIDE MONONITRATE 30 MG: 30 TABLET, EXTENDED RELEASE ORAL at 09:00

## 2017-03-11 RX ADMIN — POTASSIUM CHLORIDE 20 MEQ: 20 TABLET, EXTENDED RELEASE ORAL at 08:58

## 2017-03-11 RX ADMIN — ANTACID TABLETS 400 MG: 500 TABLET, CHEWABLE ORAL at 17:52

## 2017-03-11 RX ADMIN — INSULIN LISPRO 2 UNITS: 100 INJECTION, SOLUTION INTRAVENOUS; SUBCUTANEOUS at 12:23

## 2017-03-11 RX ADMIN — ONDANSETRON 4 MG: 2 INJECTION INTRAMUSCULAR; INTRAVENOUS at 01:31

## 2017-03-11 RX ADMIN — FUROSEMIDE 40 MG: 10 INJECTION, SOLUTION INTRAMUSCULAR; INTRAVENOUS at 02:11

## 2017-03-11 RX ADMIN — FUROSEMIDE 40 MG: 10 INJECTION, SOLUTION INTRAMUSCULAR; INTRAVENOUS at 13:40

## 2017-03-11 RX ADMIN — PYRIDOXINE HCL TAB 50 MG 50 MG: 50 TAB at 12:23

## 2017-03-11 RX ADMIN — LORAZEPAM 0.5 MG: 1 TABLET ORAL at 04:12

## 2017-03-11 RX ADMIN — HEPARIN SODIUM 5000 UNITS: 5000 INJECTION, SOLUTION INTRAVENOUS; SUBCUTANEOUS at 20:18

## 2017-03-11 RX ADMIN — ANTACID TABLETS 400 MG: 500 TABLET, CHEWABLE ORAL at 08:58

## 2017-03-11 RX ADMIN — CEFTRIAXONE SODIUM 1 G: 1 INJECTION, POWDER, FOR SOLUTION INTRAMUSCULAR; INTRAVENOUS at 13:19

## 2017-03-11 RX ADMIN — METOPROLOL TARTRATE 50 MG: 50 TABLET ORAL at 22:12

## 2017-03-11 RX ADMIN — ATORVASTATIN CALCIUM 20 MG: 20 TABLET, FILM COATED ORAL at 08:58

## 2017-03-11 RX ADMIN — ONDANSETRON 4 MG: 2 INJECTION INTRAMUSCULAR; INTRAVENOUS at 00:22

## 2017-03-11 RX ADMIN — Medication 400 MG: at 08:58

## 2017-03-11 RX ADMIN — TRAMADOL HYDROCHLORIDE 50 MG: 50 TABLET, FILM COATED ORAL at 05:20

## 2017-03-11 RX ADMIN — Medication 4 MG: at 00:21

## 2017-03-11 RX ADMIN — INSULIN LISPRO 15 UNITS: 100 INJECTION, SOLUTION INTRAVENOUS; SUBCUTANEOUS at 12:23

## 2017-03-11 RX ADMIN — VALSARTAN 320 MG: 160 TABLET ORAL at 10:22

## 2017-03-11 RX ADMIN — CLONIDINE HYDROCHLORIDE 0.2 MG: 0.1 TABLET ORAL at 22:12

## 2017-03-11 RX ADMIN — PANTOPRAZOLE SODIUM 40 MG: 40 TABLET, DELAYED RELEASE ORAL at 08:58

## 2017-03-11 RX ADMIN — HYDROMORPHONE HYDROCHLORIDE 0.5 MG: 1 INJECTION, SOLUTION INTRAMUSCULAR; INTRAVENOUS; SUBCUTANEOUS at 01:32

## 2017-03-11 RX ADMIN — HYDROCHLOROTHIAZIDE 25 MG: 25 TABLET ORAL at 08:58

## 2017-03-11 RX ADMIN — CLONIDINE HYDROCHLORIDE 0.2 MG: 0.1 TABLET ORAL at 08:58

## 2017-03-11 RX ADMIN — PANTOPRAZOLE SODIUM 40 MG: 40 INJECTION, POWDER, FOR SOLUTION INTRAVENOUS at 01:32

## 2017-03-11 RX ADMIN — LEVOTHYROXINE SODIUM 150 MCG: 150 TABLET ORAL at 07:30

## 2017-03-11 RX ADMIN — CLOPIDOGREL BISULFATE 75 MG: 75 TABLET, FILM COATED ORAL at 08:58

## 2017-03-11 RX ADMIN — CLOTRIMAZOLE AND BETAMETHASONE DIPROPIONATE: 10; .5 CREAM TOPICAL at 10:23

## 2017-03-11 NOTE — ED NOTES
Patient placed on bedpan. Given call bell and instructed to call when finished. Patient verbalized understanding.

## 2017-03-11 NOTE — ED NOTES
States pain only improved to 9/10 from 10/10 after NTG paste. Sunni Burger aware. Pt used bedpan for 100cc cloudy yellow urine.

## 2017-03-11 NOTE — ED NOTES
Patient placed on bedpan three times without void; bladder scan revealed 455 mL. Patient able to void following assistance to bedside commode. Patient unable to void while laying in bed. Patient required minimal assistance during transfer.

## 2017-03-11 NOTE — PROGRESS NOTES
Pharmacy Note    Pharmacy has received your order for melatonin 3 mg at bedtime. This product is considered an herbal/dietary supplement not subject to current FDA regulations for drug products. This order will be discontinued while the patient is in the hospital. Please resume upon patient discharge if desired. Per [employer] herbal medication policy Haley Coffman.

## 2017-03-11 NOTE — ED PROVIDER NOTES
HPI Comments:   10:46 PM  Debi Hernandes is a 80 y.o. Female with hx of HTN, HLP, DMII, CAD with 3 cardiac stents, and CHF presenting [facility] the ED [facility] C/O constant 8/10 substernal pressure like CP that radiates into left shoulder and left back onset 6 hours ago. Pt report the pain is worse when taking a breath. Associated sx's include SOB and cough. Pt is on Aspirin, Plavix and Lasix. No hx of PE/VT. Pt denies leg swelling, fever, chills, and any other symptoms or complaints. Written by KATTY Peña, as dictated by Jessica Presley PA-C     Patient is a 80 y.o. female presenting with chest pain. The history is provided by the patient. No  was used. Chest Pain (Angina)    This is a new problem. The current episode started 6 to 12 hours ago. The problem has not changed since onset. The problem occurs constantly. Associated symptoms include back pain, cough and shortness of breath. Pertinent negatives include no abdominal pain, no dizziness, no fever, no nausea, no palpitations, no vomiting and no weakness. Past Medical History:   Diagnosis Date    Anxiety     Arthritis     CAD (coronary artery disease)     Minor    Chest pain, unspecified 1/28/2011    Diabetes ([facility] [address] 1964    Adult onset for 39 years    Essential hypertension     Hiatal hernia     Hyperlipidemia     Hypothyroidism     Systolic hypertension        Past Surgical History:   Procedure Laterality Date    CARDIAC SURG PROCEDURE UNLIST      three cardiac stents     HX CAROTID ENDARTERECTOMY      HX CHOLECYSTECTOMY      HX HYSTERECTOMY      NM RADIONUCLIDE ABLATION THERAPY           History reviewed. No pertinent family history. Social History     Social History    Marital status:      Spouse name: N/A    Number of children: N/A    Years of education: N/A     Occupational History    Not on file.      Social History Main Topics    Smoking status: Former Smoker     Packs/day: 1.00    Smokeless tobacco: Not on file    Alcohol use No    Drug use: No    Sexual activity: Not on file     Other Topics Concern    Not on file     Social History Narrative         ALLERGIES: Review of patient's allergies indicates no known allergies. Review of Systems   Constitutional: Negative for chills and fever. HENT: Negative for congestion and sore throat. Respiratory: Positive for cough and shortness of breath. Cardiovascular: Positive for chest pain. Negative for palpitations and leg swelling. Gastrointestinal: Negative for abdominal pain, nausea and vomiting. Genitourinary: Negative for dysuria, frequency and urgency. Musculoskeletal: Positive for back pain. Negative for arthralgias and joint swelling. Skin: Negative for rash. Neurological: Negative for dizziness, weakness and light-headedness. Hematological: Bruises/bleeds easily (on plavix and ASA). Psychiatric/Behavioral: The patient is nervous/anxious. Vitals:    03/11/17 0104 03/11/17 0107 03/11/17 0115 03/11/17 0119   BP: (!) 201/57 (!) 201/57  148/74   Pulse: (!) 102 (!) 106 88 83   Resp: 25  18 16   Temp:       SpO2: 97%  95% 96%   Height:                Physical Exam   Constitutional: She is oriented to person, place, and time. She appears well-developed and well-nourished. No distress.  geriatric female in NAD. Appears very anxious. HENT:   Head: Normocephalic and atraumatic. Right Ear: External ear normal.   Nose: Nose normal.   Eyes: Conjunctivae are normal. Right eye exhibits no discharge. Left eye exhibits no discharge. Neck: Normal range of motion. Cardiovascular: Normal rate, regular rhythm, normal heart sounds and intact distal pulses. Exam reveals no gallop and no friction rub. No murmur heard. Pulmonary/Chest: Effort normal and breath sounds normal. No accessory muscle usage. No tachypnea. No respiratory distress. She has no decreased breath sounds.  She has no wheezes. She has no rhonchi. She has no rales. She exhibits no tenderness. Abdominal: Soft. She exhibits no distension. There is no tenderness. Musculoskeletal: Normal range of motion. Neurological: She is alert and oriented to person, place, and time. Skin: Skin is warm and dry. No rash noted. She is not diaphoretic. No erythema. Psychiatric: Judgment normal. Her mood appears anxious. Nursing note and vitals reviewed. RESULTS:    CARDIAC MONITOR NOTE:  10:40 PM   Cardiac Rhythm: NSR  Rate: 72 bpm  Intervention: none      PULSE OXIMETRY NOTE:  10:40 PM   Pulse-ox is 98% on RA  Interpretation: normal  Intervention: none      EKG interpretation: (EMS)  10:40 PM   Rate 90 bpm. Abnormal ECG. No SHER. Sinus rhythm. Possible left anterior fascicular block. Left ventricular hypertrophy. Lateral ST-T abnormality may be due to hypertrophy and/or ischemia. EKG read by Lorri Lesch, PA-C at 21:40     EKG interpretation: (subsequent)  10:42 PM   Rate 72 bpm. NSR. Left axis deviation. Left ventricular hypertrophy with repolarization abnormality. EKG read by Arsh Dorsey MD  at 22:38    EKG interpretation: (subsequent)  10:42 PM   Rate 104 bpm. Sinus tachy. Left axis deviation. Left ventricular hypertrophy with repolarization abnormality. Abnormal ECG. EKG read by Arsh Dorsey MD  at 1:01 AM    1:33 AM  RADIOLOGY FINDINGS  Chest X-ray shows NAP and no change from previous  Pending review by Radiologist  Recorded by Hale Face , ED Scribe, as dictated by Lorri Lesch, PA-C    XR CHEST PORT    (Results Pending)        Labs Reviewed   CBC WITH AUTOMATED DIFF - Abnormal; Notable for the following:        Result Value    WBC 14.0 (*)     RDW 16.6 (*)     PLATELET 190 (*)     NEUTROPHILS 74 (*)     LYMPHOCYTES 14 (*)     ABS. NEUTROPHILS 10.5 (*)     ABS.  BASOPHILS 0.1 (*)     All other components within normal limits   METABOLIC PANEL, COMPREHENSIVE - Abnormal; Notable for the following: Chloride 98 (*)     Glucose 251 (*)     BUN 39 (*)     Creatinine 1.65 (*)     BUN/Creatinine ratio 24 (*)     GFR est AA 36 (*)     GFR est non-AA 30 (*)     AST (SGOT) 14 (*)     All other components within normal limits   URINALYSIS W/ RFLX MICROSCOPIC - Abnormal; Notable for the following:     Protein 30 (*)     Leukocyte Esterase TRACE (*)     All other components within normal limits   HEMOGLOBIN A1C WITH EAG - Abnormal; Notable for the following:     Hemoglobin A1c 7.8 (*)     All other components within normal limits   URINE MICROSCOPIC ONLY - Abnormal; Notable for the following:     Bacteria 1+ (*)     All other components within normal limits   CARDIAC PANEL,(CK, CKMB & TROPONIN)   PRO-BNP   LIPASE   PROTHROMBIN TIME + INR   PTT   CARDIAC PANEL,(CK, CKMB & TROPONIN)       Recent Results (from the past 12 hour(s))   EKG, 12 LEAD, INITIAL    Collection Time: 03/10/17 10:38 PM   Result Value Ref Range    Ventricular Rate 72 BPM    Atrial Rate 72 BPM    P-R Interval 182 ms    QRS Duration 98 ms    Q-T Interval 398 ms    QTC Calculation (Bezet) 435 ms    Calculated P Axis 33 degrees    Calculated R Axis -39 degrees    Calculated T Axis 93 degrees    Diagnosis       Normal sinus rhythm  Left axis deviation  Left ventricular hypertrophy with repolarization abnormality  Abnormal ECG  When compared with ECG of 18-JUL-2016 07:33,  T wave inversion less evident in Lateral leads     CBC WITH AUTOMATED DIFF    Collection Time: 03/10/17 10:43 PM   Result Value Ref Range    WBC 14.0 (H) 4.6 - 13.2 K/uL    RBC 5.15 4.20 - 5.30 M/uL    HGB 12.5 12.0 - 16.0 g/dL    HCT 39.1 35.0 - 45.0 %    MCV 75.9 74.0 - 97.0 FL    MCH 24.3 24.0 - 34.0 PG    MCHC 32.0 31.0 - 37.0 g/dL    RDW 16.6 (H) 11.6 - 14.5 %    PLATELET 766 (H) 409 - 420 K/uL    MPV 9.3 9.2 - 11.8 FL    NEUTROPHILS 74 (H) 40 - 73 %    LYMPHOCYTES 14 (L) 21 - 52 %    MONOCYTES 8 3 - 10 %    EOSINOPHILS 3 0 - 5 %    BASOPHILS 1 0 - 2 %    ABS.  NEUTROPHILS 10.5 (H) 1.8 - 8.0 K/UL    ABS. LYMPHOCYTES 1.9 0.9 - 3.6 K/UL    ABS. MONOCYTES 1.2 0.05 - 1.2 K/UL    ABS. EOSINOPHILS 0.4 0.0 - 0.4 K/UL    ABS. BASOPHILS 0.1 (H) 0.0 - 0.06 K/UL    DF AUTOMATED     METABOLIC PANEL, COMPREHENSIVE    Collection Time: 03/10/17 10:43 PM   Result Value Ref Range    Sodium 136 136 - 145 mmol/L    Potassium 5.0 3.5 - 5.5 mmol/L    Chloride 98 (L) 100 - 108 mmol/L    CO2 28 21 - 32 mmol/L    Anion gap 10 3.0 - 18 mmol/L    Glucose 251 (H) 74 - 99 mg/dL    BUN 39 (H) 7.0 - 18 MG/DL    Creatinine 1.65 (H) 0.6 - 1.3 MG/DL    BUN/Creatinine ratio 24 (H) 12 - 20      GFR est AA 36 (L) >60 ml/min/1.73m2    GFR est non-AA 30 (L) >60 ml/min/1.73m2    Calcium 10.1 8.5 - 10.1 MG/DL    Bilirubin, total 0.3 0.2 - 1.0 MG/DL    ALT (SGPT) 20 13 - 56 U/L    AST (SGOT) 14 (L) 15 - 37 U/L    Alk.  phosphatase 105 45 - 117 U/L    Protein, total 7.4 6.4 - 8.2 g/dL    Albumin 3.7 3.4 - 5.0 g/dL    Globulin 3.7 2.0 - 4.0 g/dL    A-G Ratio 1.0 0.8 - 1.7     CARDIAC PANEL,(CK, CKMB & TROPONIN)    Collection Time: 03/10/17 10:43 PM   Result Value Ref Range    CK 94 26 - 192 U/L    CK - MB 1.5 <3.6 ng/ml    CK-MB Index 1.6 0.0 - 4.0 %    Troponin-I, Qt. <0.02 0.00 - 0.06 NG/ML   PRO-BNP    Collection Time: 03/10/17 10:43 PM   Result Value Ref Range    NT pro- 0 - 1800 PG/ML   LIPASE    Collection Time: 03/10/17 10:43 PM   Result Value Ref Range    Lipase 77 73 - 393 U/L   PROTHROMBIN TIME + INR    Collection Time: 03/10/17 10:43 PM   Result Value Ref Range    Prothrombin time 12.0 11.5 - 15.2 sec    INR 0.9 0.8 - 1.2     PTT    Collection Time: 03/10/17 10:43 PM   Result Value Ref Range    aPTT 27.1 23.0 - 36.4 SEC   HEMOGLOBIN A1C WITH EAG    Collection Time: 03/10/17 10:43 PM   Result Value Ref Range    Hemoglobin A1c 7.8 (H) 4.5 - 5.6 %    Est. average glucose 177 mg/dL   URINALYSIS W/ RFLX MICROSCOPIC    Collection Time: 03/10/17 10:55 PM   Result Value Ref Range    Color YELLOW      Appearance CLEAR      Specific gravity 1.007 1.005 - 1.030      pH (UA) 6.0 5.0 - 8.0      Protein 30 (A) NEG mg/dL    Glucose NEGATIVE  NEG mg/dL    Ketone NEGATIVE  NEG mg/dL    Bilirubin NEGATIVE  NEG      Blood NEGATIVE  NEG      Urobilinogen 0.2 0.2 - 1.0 EU/dL    Nitrites NEGATIVE  NEG      Leukocyte Esterase TRACE (A) NEG     URINE MICROSCOPIC ONLY    Collection Time: 03/10/17 10:55 PM   Result Value Ref Range    WBC 0 to 2 0 - 5 /hpf    RBC NEGATIVE  0 - 5 /hpf    Epithelial cells FEW 0 - 5 /lpf    Bacteria 1+ (A) NEG /hpf    Mucus NEGATIVE  NEG /lpf   EKG, 12 LEAD, SUBSEQUENT    Collection Time: 03/11/17  1:01 AM   Result Value Ref Range    Ventricular Rate 104 BPM    Atrial Rate 104 BPM    P-R Interval 192 ms    QRS Duration 102 ms    Q-T Interval 352 ms    QTC Calculation (Bezet) 462 ms    Calculated P Axis 49 degrees    Calculated R Axis -40 degrees    Calculated T Axis 107 degrees    Diagnosis       Sinus tachycardia  Left axis deviation  Left ventricular hypertrophy with repolarization abnormality  Abnormal ECG  When compared with ECG of 10-MAR-2017 22:38,  No significant change was found     CARDIAC PANEL,(CK, CKMB & TROPONIN)    Collection Time: 03/11/17  1:10 AM   Result Value Ref Range     26 - 192 U/L    CK - MB 1.6 <3.6 ng/ml    CK-MB Index 1.6 0.0 - 4.0 %    Troponin-I, Qt. 0.02 0.00 - 0.06 NG/ML        MDM  Number of Diagnoses or Management Options  Accelerated hypertension:   Chest pain, unspecified type:   History of CHF (congestive heart failure): Hyperglycemia without ketosis:   Renal insufficiency:   Diagnosis management comments: ACS/MI, arrhythmia, hypertensive urgency, pericarditis, myocarditis, PNA, PTX, GERD, CHF, COPD, asthma/RAD.  Doubt PE or dissection       Amount and/or Complexity of Data Reviewed  Clinical lab tests: ordered and reviewed  Tests in the radiology section of CPT®: ordered and reviewed (CXR)  Tests in the medicine section of CPT®: reviewed and ordered (EKG)  Discuss the patient with other providers: yes Vicki Capellan MD (attending)  Nickolas Costello MD (hospitalist))  Independent visualization of images, tracings, or specimens: yes (EKG, CXR)      ED Course     MEDICATIONS GIVEN:  Medications   furosemide (LASIX) injection 40 mg (not administered)   albuterol-ipratropium (DUO-NEB) 2.5 MG-0.5 MG/3 ML (3 mL Nebulization Given 3/10/17 2353)   nitroglycerin (NITROBID) 2 % ointment 1 Inch (1 Inch Topical Given 3/10/17 2353)   hydrALAZINE (APRESOLINE) 20 mg/mL injection 20 mg (20 mg IntraVENous Given 3/11/17 0021)   morphine injection 4 mg (4 mg IntraVENous Given 3/11/17 0021)   ondansetron (ZOFRAN) injection 4 mg (4 mg IntraVENous Given 3/11/17 0022)   labetalol (NORMODYNE;TRANDATE) injection 20 mg (20 mg IntraVENous Given 3/11/17 0107)   HYDROmorphone (PF) (DILAUDID) injection 0.5 mg (0.5 mg IntraVENous Given 3/11/17 0132)   ondansetron (ZOFRAN) injection 4 mg (4 mg IntraVENous Given 3/11/17 0131)   pantoprazole (PROTONIX) injection 40 mg (40 mg IntraVENous Given 3/11/17 0132)        Procedures    PROGRESS NOTE:  10:46 PM  Initial assessment performed. Written by Miya Chong, ED Scribe, as dictated by Efrain Orellana PA-C    PROGRESS NOTE:   12:09 AM  Pt has been re-examined by Efrain Orellana PA-C. Pain not improved with nitro ointment. Will give Hydralazine for BP and morphine for discomfort. Written by Miya Chong, ED Scribe, as dictated by Efrain Orellana PA-C. PROGRESS NOTE:   12:40 AM  Pt has been re-examined by Efrain Orellana PA-C. Pain has not improved with morphine   Written by Miya Chong, ED Scribe, as dictated by Efrain Orellana PA-C.     CONSULT NOTE:   1:00 AM  Efrain Orellana PA-C spoke with Vicki Capellan MD    Specialty: ED attending  Discussed pt's hx, disposition, and available diagnostic and imaging results. Reviewed care plans. Consulting physician agrees with plans as outlined. He recommends admisison for chest pain.    Written by Edyta Clark PA-C, ED Scribe, as dictated by Bria Zamorano Butch Morales PA-C.     CONSULT NOTE:   1:30 AM  Nickolas Payne PA-C  spoke with Arturo Francisco MD    Specialty: hospitalist  Discussed pt's hx, disposition, and available diagnostic and imaging results. Reviewed care plans. Consulting physician agrees with plans as outlined. He recommends giving her 40 mg of lasix and admitting her to obs. BP well controlled. Written by KATTY Donaldsonibbirgit, as dictated by Nickolas Payne PA-C     ADMISSION NOTE:  1:30 AM  Patient is being admitted to the hospital by Arturo Francisco MD. The results of their tests and reasons for their admission have been discussed with them and/or available family. They convey agreement and understanding for the need to be admitted and for their admission diagnosis. Written by KATTY Donaldson, as dictated by Nickolas Payne PA-C.     CONDITIONS ON ADMISSION:  1:30 AM   Deep Vein Thrombosis is not present at the time of admission. Thrombosis is not present at the time of admission. Urinary Tract Infection is not present at the time of admission. Pneumonia is not present at the time of admission. MRSA is not present at the time of admission. Wound infection is not present at the time of admission. Pressure Ulcer is not present at the time of admission. Written by KATTY Donaldson, as dictated by Arturo Francisco MD.    PROGRESS NOTE:   1:39 AM  Pt has been re-examined by Nickolas Payne PA-C. Pt is still in pain, no relief with morphine or nitro. Dilaudid is being given now. Will give 40mg Lasix after discussion with hospitalist .  Written by KATTY Donaldson, as dictated by Nickolas Payne PA-C.      CLINICAL IMPRESSION:    1. Chest pain, unspecified type    2. Accelerated hypertension    3. History of CHF (congestive heart failure)    4. Renal insufficiency    5. Hyperglycemia without ketosis        ATTESTATIONS:  This note is prepared by Saira Campos, acting as Scribe for Nickolas Payne PA-C.     Nickolas Payne PA-C: The scribe's documentation has been prepared under my direction and personally reviewed by me in its entirety. I confirm that the note above accurately reflects all work, treatment, procedures, and medical decision making performed by me.

## 2017-03-11 NOTE — ED NOTES
Bedside shift change report given to Pritesh Ritchie RN  (oncoming nurse) by Noris Morales RN (offgoing nurse). Report included the following information SBAR, ED Summary, Intake/Output, MAR, Recent Results and Cardiac Rhythm NSR. Patient sleeping at this time. Lights turned down for comfort. Call bell in reach. Remains on cardiac monitoring.

## 2017-03-11 NOTE — H&P
History & Physical    Patient: Ginny Maloney MRN: [id number]  CSN: [id number]    YOB: 1932  Age: 80 y.o. Sex: female      DOA: 3/10/2017  Primary Care Provider:  Anthony See MD      Assessment/Plan     Patient Active Problem List   Diagnosis Code    Chest pain, unspecified R07.9    Shortness of breath R06.02    Angina pectoris ([facility]) I20.9    CAD (coronary artery disease) I25.10    HTN (hypertension) I10    CHF (congestive heart failure) ([facility]) I50.9    Hypertensive urgency I16.0    PAM (acute kidney injury) ([facility] N17.9    DM [facility] mellitus) [facility] E11.9    Elevated WBC count D72.829    Hyponatremia E87.1    CKD (chronic kidney disease) stage 3, GFR 30-59 ml/min N18.3       -admit for further care   -Sob most likely from sob from hypertensive urgency/emergency from pulm edema  -elevated trop from demand ischemia/cardiac strain   -appears to be in Acute on chronic diastolic CHF ef 37% 9 months ago  -will trend CE, nitro prn for chest pain  -lasix 40mg iv once, I/Os, daily weight. Fluid restriction   -recheck 2d echo   -monitor BP  -accuchecks, ISS, diabetic diet/cardiac   -trend Cr, appears to be stable at this time. Diurese with caution.   -stress test during last admission neg for ischemia   -leukocytosis appears to be mild and chronic, no signs of infection. UTI? Will send urine cultures   -supplemental O2  -cont home meds         CC: chest pain/sob/ elevated bp       HPI:     Ginny Maloney is a 80 y.o. female comes to the hospital from the Nursing home for evaluation of sob/chest pain and elevated bp. Patient states yesterday around 4pm she started having pressure like subternal constant chest pain without any radiation of the pain, it was accompanied with some sob (more so than her baseline). This pain was at rest. NH stafff asked her to rest and dinner will be brought to her.  Her pain persisted therefore staff checked her BP later on around 9pm and it was noted to be >933 systolic therefore sent to the ED. In the ED she was again noted to be elevated with slightly elevated trop and bnp. Some signs of pulm vascular congestion. She was given nitro with very minimal relief. She was also given labatelol and hydralazine IV for BP which helped her BP. When I arrived at bedside, her BP was around 150s/60s and her pain had seemed to eased off but had not resolved. She still reported of some sob. She reported of having similar symptoms 9 months ago when she was brought here and she underwent echo and stress which was both neg at the time. DNR  No other complaints at this time. Past Medical History:   Diagnosis Date    Anxiety     Arthritis     CAD (coronary artery disease)     Minor    Chest pain, unspecified 1/28/2011    Diabetes ([facility] 1964    Adult onset for 39 years    Essential hypertension     Hiatal hernia     Hyperlipidemia     Hypothyroidism     Systolic hypertension        Past Surgical History:   Procedure Laterality Date    CARDIAC SURG PROCEDURE UNLIST      three cardiac stents     HX CAROTID ENDARTERECTOMY      HX CHOLECYSTECTOMY      HX HYSTERECTOMY      NM RADIONUCLIDE ABLATION THERAPY         History reviewed. No pertinent family history. Social History     Social History    Marital status:      Spouse name: N/A    Number of children: N/A    Years of education: N/A     Social History Main Topics    Smoking status: Former Smoker     Packs/day: 1.00    Smokeless tobacco: None    Alcohol use No    Drug use: No    Sexual activity: Not Asked     Other Topics Concern    None     Social History Narrative       Prior to Admission medications    Medication Sig Start Date End Date Taking? Authorizing Provider   diphenhydrAMINE (BENADRYL) 25 mg tablet Take 25 mg by mouth every six (6) hours as needed for Sleep. Yes Phys Other, MD   melatonin 3 mg tablet Take 3 mg by mouth.    Yes Phys Other, MD   miconazole (MICONAZOLE 7) 2 % vaginal cream Insert 1 Applicator into vagina nightly. Yes Tram Jackman MD   calcium carbonate (HODAN-GEST ANTACID) 200 mg calcium (500 mg) chew Take 2 Tabs by mouth three (3) times daily. Yes Tram Jackman MD   loperamide (IMMODIUM) 2 mg tablet Take 2 mg by mouth daily. Yes Tram Jackman MD   naphazoline-pheniramine (NAPHCON-A) 0.025-0.3 % ophthalmic solution Administer  to both eyes. Yes Tram Jackman MD   clotrimazole-betamethasone (LOTRISONE) 1-0.05 % lotion Apply  to affected area two (2) times a day. Yes Tram Jackman MD   acetaminophen (TYLENOL) 325 mg tablet Take 650 mg by mouth three (3) times daily as needed for Pain. Yes Tram Jackman MD   meclizine (ANTIVERT) 12.5 mg tablet Take 12.5 mg by mouth four (4) times daily as needed. Yes Tram Jackman MD   polyethylene glycol (MIRALAX) 17 gram/dose powder Take 17 g by mouth as needed. Yes Tram Jackman MD   traMADol (ULTRAM) 50 mg tablet Take 50 mg by mouth every six (6) hours as needed for Pain. Yes Tram Jackman MD   valsartan (DIOVAN) 320 mg tablet Take 320 mg by mouth daily. Hold for SBP less than 90   Yes Tram Jackman MD   pitavastatin (LIVALO) 2 mg tablet Take 1 mg by mouth daily. Yes Tram Jackman MD   promethazine (PHENERGAN) 6.25 mg/5 mL syrup Take 6.25 mg by mouth four (4) times daily as needed for Nausea. Yes Tram Jackman MD   cloNIDine HCl (CATAPRES) 0.2 mg tablet Take 1 Tab by mouth two (2) times a day. Patient taking differently: Take 0.2 mg by mouth three (3) times daily. 5/7/15  Yes Elena Marrero MD   furosemide (LASIX) 40 mg tablet Take 1 Tab by mouth daily. Patient taking differently: Take 20 mg by mouth daily. 5/6/15  Yes Dawood Noble DO   potassium chloride (K-DUR, KLOR-CON) 20 mEq tablet Take  by mouth two (2) times a day. Yes Tram Jackman MD   amlodipine (NORVASC) 5 mg tablet Take 10 mg by mouth daily. Yes Historical Provider   aspirin 81 mg tablet Take 81 mg by mouth daily.    Yes Historical Provider   cholecalciferol, vitamin d3, (VITAMIN D) 1,000 unit tablet Take 1,000 Units by mouth daily. Yes Historical Provider   levothyroxine (SYNTHROID) 125 mcg tablet Take 150 mcg by mouth Daily (before breakfast). Yes Historical Provider   pyridoxine (VITAMIN B-6) 50 mg tablet Take 50 mg by mouth daily. Yes Historical Provider   clopidogrel (PLAVIX) 75 mg tablet Take 75 mg by mouth daily. Yes Historical Provider   nitroglycerin (NITROQUICK) 0.4 mg SL tablet 0.4 mg by SubLINGual route every five (5) minutes as needed. Yes Historical Provider   INSULIN GLARGINE,HUM. REC. ANLOG (LANTUS SC) 55 Units by SubCUTAneous route as directed. 1/28/11  Yes Historical Provider   insulin lispro (HUMALOG) 100 unit/mL injection 12 Units by SubCUTAneous route three (3) times daily. Given per sliding scale. Medications administered today as documented. Tram Jackman MD   magnesium oxide (MAG-OX) 400 mg tablet Take 400 mg by mouth daily. Tram Jackman MD   insulin aspart (NOVOLOG) 100 unit/mL injection 15 Units by SubCUTAneous route. Tram Jackman MD   LORazepam (ATIVAN) 0.5 mg tablet Take 1 Tab by mouth every eight (8) hours as needed for Anxiety. Max Daily Amount: 1.5 mg. 3/2/15   GARCIA Weinstein   atorvastatin (LIPITOR) 20 mg tablet Take  by mouth daily. Historical Provider   pantoprazole (PROTONIX) 40 mg tablet Take 40 mg by mouth daily. Historical Provider   INSULIN ASPART (NOVOLOG SC) 12 Units by SubCUTAneous route three (3) times daily (with meals). Indications: breakfast and lunch 1/28/11   Historical Provider   isosorbide mononitrate ER (IMDUR) 30 mg tablet Take 30 mg by mouth two (2) times a day. Historical Provider   hydrochlorothiazide (HYDRODIURIL) 25 mg tablet Take 25 mg by mouth daily. Historical Provider   metoprolol (LOPRESSOR) 50 mg tablet Take  by mouth two (2) times a day. Historical Provider       No Known Allergies    Review of Systems  Gen: No fever, chills, malaise, weight loss/gain.    Heent: No headache, rhinorrhea, epistaxis, ear pain, hearing loss, sinus pain, neck pain/stiffness, sore throat. Heart: No palpitations,pnd  Resp: No cough, hemoptysis, wheezing  GI: No nausea, vomiting, diarrhea, constipation, melena or hematochezia. : No urinary obstruction, dysuria or hematuria. Derm: No rash, new skin lesion or pruritis. Musc/skeletal: no bone or joint complains. Vasc: No edema, cyanosis or claudication. Endo: No heat/cold intolerance, no polyuria,polydipsia or polyphagia. Neuro: No unilateral weakness, numbness, tingling. No seizures. Heme: No easy bruising or bleeding. Physical Exam:     Physical Exam:  Visit Vitals    /58    Pulse 77    Temp 97 °F (36.1 °C)    Resp 21    Ht 5' 3\" (1.6 m)    SpO2 98%      O2 Device: Room air    Temp (24hrs), Av °F (36.1 °C), Min:97 °F (36.1 °C), Max:97 °F (36.1 °C)             General:  Awake, cooperative, no distress. Head:  Normocephalic, without obvious abnormality, atraumatic. Eyes:  Conjunctivae/corneas clear, sclera anicteric, PERRL, EOMs intact. Nose: Nares normal. No drainage or sinus tenderness. Throat: Lips, mucosa, and tongue normal.    Neck: Supple, symmetrical, trachea midline, no adenopathy. Lungs:   B/l bibasilar crackles       Heart:  Regular rate and rhythm, S1, S2 normal, no murmur, click, rub or gallop. Abdomen: Soft, non-tender. Bowel sounds normal. No masses,  No organomegaly. Extremities: Extremities normal, atraumatic, no cyanosis or edema. Capillary refill normal.   Pulses: Trace b/l LE ededma and symmetric all extremities. Skin: Skin color pink/pale/mottled/flushed, turgor normal. No rashes or lesions   Neurologic: CNII-XII intact. No focal motor or sensory deficit.        Labs Reviewed:    Recent Results (from the past 24 hour(s))   EKG, 12 LEAD, INITIAL    Collection Time: 03/10/17 10:38 PM   Result Value Ref Range    Ventricular Rate 72 BPM    Atrial Rate 72 BPM    P-R Interval 182 ms    QRS Duration 98 ms    Q-T Interval 398 ms    QTC Calculation (Bezet) 435 ms    Calculated P Axis 33 degrees    Calculated R Axis -39 degrees    Calculated T Axis 93 degrees    Diagnosis       Normal sinus rhythm  Left axis deviation  Left ventricular hypertrophy with repolarization abnormality  Abnormal ECG  When compared with ECG of 18-JUL-2016 07:33,  T wave inversion less evident in Lateral leads     CBC WITH AUTOMATED DIFF    Collection Time: 03/10/17 10:43 PM   Result Value Ref Range    WBC 14.0 (H) 4.6 - 13.2 K/uL    RBC 5.15 4.20 - 5.30 M/uL    HGB 12.5 12.0 - 16.0 g/dL    HCT 39.1 35.0 - 45.0 %    MCV 75.9 74.0 - 97.0 FL    MCH 24.3 24.0 - 34.0 PG    MCHC 32.0 31.0 - 37.0 g/dL    RDW 16.6 (H) 11.6 - 14.5 %    PLATELET 751 (H) 666 - 420 K/uL    MPV 9.3 9.2 - 11.8 FL    NEUTROPHILS 74 (H) 40 - 73 %    LYMPHOCYTES 14 (L) 21 - 52 %    MONOCYTES 8 3 - 10 %    EOSINOPHILS 3 0 - 5 %    BASOPHILS 1 0 - 2 %    ABS. NEUTROPHILS 10.5 (H) 1.8 - 8.0 K/UL    ABS. LYMPHOCYTES 1.9 0.9 - 3.6 K/UL    ABS. MONOCYTES 1.2 0.05 - 1.2 K/UL    ABS. EOSINOPHILS 0.4 0.0 - 0.4 K/UL    ABS. BASOPHILS 0.1 (H) 0.0 - 0.06 K/UL    DF AUTOMATED     METABOLIC PANEL, COMPREHENSIVE    Collection Time: 03/10/17 10:43 PM   Result Value Ref Range    Sodium 136 136 - 145 mmol/L    Potassium 5.0 3.5 - 5.5 mmol/L    Chloride 98 (L) 100 - 108 mmol/L    CO2 28 21 - 32 mmol/L    Anion gap 10 3.0 - 18 mmol/L    Glucose 251 (H) 74 - 99 mg/dL    BUN 39 (H) 7.0 - 18 MG/DL    Creatinine 1.65 (H) 0.6 - 1.3 MG/DL    BUN/Creatinine ratio 24 (H) 12 - 20      GFR est AA 36 (L) >60 ml/min/1.73m2    GFR est non-AA 30 (L) >60 ml/min/1.73m2    Calcium 10.1 8.5 - 10.1 MG/DL    Bilirubin, total 0.3 0.2 - 1.0 MG/DL    ALT (SGPT) 20 13 - 56 U/L    AST (SGOT) 14 (L) 15 - 37 U/L    Alk.  phosphatase 105 45 - 117 U/L    Protein, total 7.4 6.4 - 8.2 g/dL    Albumin 3.7 3.4 - 5.0 g/dL    Globulin 3.7 2.0 - 4.0 g/dL    A-G Ratio 1.0 0.8 - 1.7     CARDIAC PANEL,(CK, CKMB & TROPONIN) Collection Time: 03/10/17 10:43 PM   Result Value Ref Range    CK 94 26 - 192 U/L    CK - MB 1.5 <3.6 ng/ml    CK-MB Index 1.6 0.0 - 4.0 %    Troponin-I, Qt. <0.02 0.00 - 0.06 NG/ML   PRO-BNP    Collection Time: 03/10/17 10:43 PM   Result Value Ref Range    NT pro- 0 - 1800 PG/ML   LIPASE    Collection Time: 03/10/17 10:43 PM   Result Value Ref Range    Lipase 77 73 - 393 U/L   PROTHROMBIN TIME + INR    Collection Time: 03/10/17 10:43 PM   Result Value Ref Range    Prothrombin time 12.0 11.5 - 15.2 sec    INR 0.9 0.8 - 1.2     PTT    Collection Time: 03/10/17 10:43 PM   Result Value Ref Range    aPTT 27.1 23.0 - 36.4 SEC   HEMOGLOBIN A1C WITH EAG    Collection Time: 03/10/17 10:43 PM   Result Value Ref Range    Hemoglobin A1c 7.8 (H) 4.5 - 5.6 %    Est. average glucose 177 mg/dL   URINALYSIS W/ RFLX MICROSCOPIC    Collection Time: 03/10/17 10:55 PM   Result Value Ref Range    Color YELLOW      Appearance CLEAR      Specific gravity 1.007 1.005 - 1.030      pH (UA) 6.0 5.0 - 8.0      Protein 30 (A) NEG mg/dL    Glucose NEGATIVE  NEG mg/dL    Ketone NEGATIVE  NEG mg/dL    Bilirubin NEGATIVE  NEG      Blood NEGATIVE  NEG      Urobilinogen 0.2 0.2 - 1.0 EU/dL    Nitrites NEGATIVE  NEG      Leukocyte Esterase TRACE (A) NEG     URINE MICROSCOPIC ONLY    Collection Time: 03/10/17 10:55 PM   Result Value Ref Range    WBC 0 to 2 0 - 5 /hpf    RBC NEGATIVE  0 - 5 /hpf    Epithelial cells FEW 0 - 5 /lpf    Bacteria 1+ (A) NEG /hpf    Mucus NEGATIVE  NEG /lpf   EKG, 12 LEAD, SUBSEQUENT    Collection Time: 03/11/17  1:01 AM   Result Value Ref Range    Ventricular Rate 104 BPM    Atrial Rate 104 BPM    P-R Interval 192 ms    QRS Duration 102 ms    Q-T Interval 352 ms    QTC Calculation (Bezet) 462 ms    Calculated P Axis 49 degrees    Calculated R Axis -40 degrees    Calculated T Axis 107 degrees    Diagnosis       Sinus tachycardia  Left axis deviation  Left ventricular hypertrophy with repolarization abnormality  Abnormal ECG  When compared with ECG of 10-MAR-2017 22:38,  No significant change was found     CARDIAC PANEL,(CK, CKMB & TROPONIN)    Collection Time: 03/11/17  1:10 AM   Result Value Ref Range     26 - 192 U/L    CK - MB 1.6 <3.6 ng/ml    CK-MB Index 1.6 0.0 - 4.0 %    Troponin-I, Qt. 0.02 0.00 - 0.06 NG/ML       Procedures/imaging: see electronic medical records for all procedures/Xrays and details which were not copied into this note but were reviewed prior to creation of Plan    70 minutes of  care time spent in the direct evaluation and treatment of this high risk patient.      CC: Becky Sheets MD

## 2017-03-11 NOTE — PROGRESS NOTES
Patient requested visit. She expressed a breathing difficulty and was being treated with medicine, no oxygen or intubation. She is Orthodoxy. Later this night at 0200, she requested SOS. I informed the ED that since the patient was not in distress nor actively dying, a  could come the following day.     Chaplain Nakul Ho

## 2017-03-11 NOTE — ED TRIAGE NOTES
Pt c/o mid sternal non radiating chest pain since 4pm.  No modifying factors. Accompanied by shortness of breath. Sepsis Screening completed    (  )Patient meets SIRS criteria. ( X )Patient does not meet SIRS criteria.       SIRS Criteria is achieved when two or more of the following are present   Temperature < 96.8°F (36°C) or > 100.9°F (38.3°C)   Heart Rate > 90 beats per minute   Respiratory Rate > 20 breaths per minute   WBC count > 12,000 or <4,000 or > 10% bands

## 2017-03-11 NOTE — PROGRESS NOTES
conducted a Follow up consultation and Spiritual Assessment for Wesley Ramos, who is a 80 y.o.,female. The  provided the following Interventions:  Continued the relationship of care and support. Listened empathically. Prayer, Communion and Sacrament of the Sick provided by [facility] Valente [facility] of [place] [address]. Chart reviewed. The following outcomes were achieved:  Patient expressed gratitude for pastoral care visit. Assessment:  There are no further spiritual or Jew issues which require Spiritual Care Services interventions at this time. Plan:  Chaplains will continue to follow and will provide pastoral care on an as needed/requested basis.  recommends bedside caregivers page  on duty if patient shows signs of acute spiritual or emotional distress. Rev.  [address]  [phone]

## 2017-03-11 NOTE — ED NOTES
Patient provided with warm blankets. Patient's pain remains on unchanged. Lights turned down for comfort. Remains on cardiac monitoring. Side rail up for safety.

## 2017-03-11 NOTE — PROGRESS NOTES
Rounded to check on pt  Seen and admitted this am    BP better controlled  No resp distress or SOB  Feeling better    Labs and BS reviewed    Add rocephin for poss UTI

## 2017-03-12 ENCOUNTER — APPOINTMENT (OUTPATIENT)
Dept: GENERAL RADIOLOGY | Age: 82
End: 2017-03-12
Attending: HOSPITALIST
Payer: MEDICARE

## 2017-03-12 PROBLEM — N18.4 CKD (CHRONIC KIDNEY DISEASE) STAGE 4, GFR 15-29 ML/MIN (HCC): Status: ACTIVE | Noted: 2017-03-12

## 2017-03-12 PROBLEM — N18.30 CKD (CHRONIC KIDNEY DISEASE) STAGE 3, GFR 30-59 ML/MIN (HCC): Status: RESOLVED | Noted: 2017-03-11 | Resolved: 2017-03-12

## 2017-03-12 LAB
ALBUMIN SERPL BCP-MCNC: 3.2 G/DL (ref 3.4–5)
ALBUMIN/GLOB SERPL: 0.9 {RATIO} (ref 0.8–1.7)
ALP SERPL-CCNC: 87 U/L (ref 45–117)
ALT SERPL-CCNC: 19 U/L (ref 13–56)
ANION GAP BLD CALC-SCNC: 6 MMOL/L (ref 3–18)
APTT PPP: 29.2 SEC (ref 23–36.4)
AST SERPL W P-5'-P-CCNC: 17 U/L (ref 15–37)
BASOPHILS # BLD AUTO: 0.1 K/UL (ref 0–0.06)
BASOPHILS # BLD: 1 % (ref 0–2)
BILIRUB SERPL-MCNC: 0.3 MG/DL (ref 0.2–1)
BUN SERPL-MCNC: 47 MG/DL (ref 7–18)
BUN/CREAT SERPL: 19 (ref 12–20)
CALCIUM SERPL-MCNC: 9 MG/DL (ref 8.5–10.1)
CHLORIDE SERPL-SCNC: 101 MMOL/L (ref 100–108)
CK MB CFR SERPL CALC: 0.8 % (ref 0–4)
CK MB SERPL-MCNC: 1.7 NG/ML (ref 5–25)
CK SERPL-CCNC: 220 U/L (ref 26–192)
CO2 SERPL-SCNC: 28 MMOL/L (ref 21–32)
CREAT SERPL-MCNC: 2.54 MG/DL (ref 0.6–1.3)
DIFFERENTIAL METHOD BLD: ABNORMAL
EOSINOPHIL # BLD: 0.4 K/UL (ref 0–0.4)
EOSINOPHIL NFR BLD: 3 % (ref 0–5)
ERYTHROCYTE [DISTWIDTH] IN BLOOD BY AUTOMATED COUNT: 17.1 % (ref 11.6–14.5)
GLOBULIN SER CALC-MCNC: 3.5 G/DL (ref 2–4)
GLUCOSE BLD STRIP.AUTO-MCNC: 135 MG/DL (ref 70–110)
GLUCOSE BLD STRIP.AUTO-MCNC: 170 MG/DL (ref 70–110)
GLUCOSE BLD STRIP.AUTO-MCNC: 176 MG/DL (ref 70–110)
GLUCOSE BLD STRIP.AUTO-MCNC: 195 MG/DL (ref 70–110)
GLUCOSE BLD STRIP.AUTO-MCNC: 214 MG/DL (ref 70–110)
GLUCOSE SERPL-MCNC: 189 MG/DL (ref 74–99)
HCT VFR BLD AUTO: 36.5 % (ref 35–45)
HGB BLD-MCNC: 11.4 G/DL (ref 12–16)
INR PPP: 0.9 (ref 0.8–1.2)
LYMPHOCYTES # BLD AUTO: 12 % (ref 21–52)
LYMPHOCYTES # BLD: 1.5 K/UL (ref 0.9–3.6)
MCH RBC QN AUTO: 24 PG (ref 24–34)
MCHC RBC AUTO-ENTMCNC: 31.2 G/DL (ref 31–37)
MCV RBC AUTO: 76.8 FL (ref 74–97)
MONOCYTES # BLD: 1.3 K/UL (ref 0.05–1.2)
MONOCYTES NFR BLD AUTO: 11 % (ref 3–10)
NEUTS SEG # BLD: 9.2 K/UL (ref 1.8–8)
NEUTS SEG NFR BLD AUTO: 73 % (ref 40–73)
PLATELET # BLD AUTO: 565 K/UL (ref 135–420)
PMV BLD AUTO: 9.7 FL (ref 9.2–11.8)
POTASSIUM SERPL-SCNC: 5.5 MMOL/L (ref 3.5–5.5)
PROT SERPL-MCNC: 6.7 G/DL (ref 6.4–8.2)
PROTHROMBIN TIME: 11.8 SEC (ref 11.5–15.2)
RBC # BLD AUTO: 4.75 M/UL (ref 4.2–5.3)
SODIUM SERPL-SCNC: 135 MMOL/L (ref 136–145)
TROPONIN I SERPL-MCNC: 0.07 NG/ML (ref 0–0.06)
TSH SERPL DL<=0.05 MIU/L-ACNC: 1.92 UIU/ML (ref 0.36–3.74)
WBC # BLD AUTO: 12.4 K/UL (ref 4.6–13.2)

## 2017-03-12 PROCEDURE — 74011000258 HC RX REV CODE- 258: Performed by: HOSPITALIST

## 2017-03-12 PROCEDURE — 82550 ASSAY OF CK (CPK): CPT | Performed by: INTERNAL MEDICINE

## 2017-03-12 PROCEDURE — 85025 COMPLETE CBC W/AUTO DIFF WBC: CPT | Performed by: INTERNAL MEDICINE

## 2017-03-12 PROCEDURE — 80053 COMPREHEN METABOLIC PANEL: CPT | Performed by: INTERNAL MEDICINE

## 2017-03-12 PROCEDURE — 74011250636 HC RX REV CODE- 250/636: Performed by: INTERNAL MEDICINE

## 2017-03-12 PROCEDURE — G8979 MOBILITY GOAL STATUS: HCPCS

## 2017-03-12 PROCEDURE — 85730 THROMBOPLASTIN TIME PARTIAL: CPT | Performed by: INTERNAL MEDICINE

## 2017-03-12 PROCEDURE — 74011250636 HC RX REV CODE- 250/636: Performed by: HOSPITALIST

## 2017-03-12 PROCEDURE — 74011250637 HC RX REV CODE- 250/637: Performed by: INTERNAL MEDICINE

## 2017-03-12 PROCEDURE — G8978 MOBILITY CURRENT STATUS: HCPCS

## 2017-03-12 PROCEDURE — 85610 PROTHROMBIN TIME: CPT | Performed by: INTERNAL MEDICINE

## 2017-03-12 PROCEDURE — 96372 THER/PROPH/DIAG INJ SC/IM: CPT

## 2017-03-12 PROCEDURE — 74000 XR ABD PORT  1 V: CPT

## 2017-03-12 PROCEDURE — 36415 COLL VENOUS BLD VENIPUNCTURE: CPT | Performed by: INTERNAL MEDICINE

## 2017-03-12 PROCEDURE — 82962 GLUCOSE BLOOD TEST: CPT

## 2017-03-12 PROCEDURE — 99218 HC RM OBSERVATION: CPT

## 2017-03-12 PROCEDURE — 97161 PT EVAL LOW COMPLEX 20 MIN: CPT

## 2017-03-12 PROCEDURE — 74011250637 HC RX REV CODE- 250/637: Performed by: HOSPITALIST

## 2017-03-12 RX ORDER — LEVOFLOXACIN 250 MG/1
250 TABLET ORAL ONCE
Status: COMPLETED | OUTPATIENT
Start: 2017-03-12 | End: 2017-03-12

## 2017-03-12 RX ORDER — SUCRALFATE 1 G/10ML
1 SUSPENSION ORAL
Status: DISCONTINUED | OUTPATIENT
Start: 2017-03-12 | End: 2017-03-13 | Stop reason: HOSPADM

## 2017-03-12 RX ORDER — POLYETHYLENE GLYCOL 3350 17 G/17G
17 POWDER, FOR SOLUTION ORAL DAILY
Status: DISCONTINUED | OUTPATIENT
Start: 2017-03-12 | End: 2017-03-12

## 2017-03-12 RX ORDER — FLUCONAZOLE 100 MG/1
100 TABLET ORAL DAILY
Status: DISCONTINUED | OUTPATIENT
Start: 2017-03-12 | End: 2017-03-12

## 2017-03-12 RX ADMIN — LEVOFLOXACIN 250 MG: 250 TABLET, FILM COATED ORAL at 14:37

## 2017-03-12 RX ADMIN — LEVOTHYROXINE SODIUM 150 MCG: 150 TABLET ORAL at 07:00

## 2017-03-12 RX ADMIN — ISOSORBIDE MONONITRATE 30 MG: 30 TABLET, EXTENDED RELEASE ORAL at 11:15

## 2017-03-12 RX ADMIN — CLOTRIMAZOLE AND BETAMETHASONE DIPROPIONATE: 10; .5 CREAM TOPICAL at 11:21

## 2017-03-12 RX ADMIN — INSULIN LISPRO 15 UNITS: 100 INJECTION, SOLUTION INTRAVENOUS; SUBCUTANEOUS at 09:01

## 2017-03-12 RX ADMIN — CLOPIDOGREL BISULFATE 75 MG: 75 TABLET, FILM COATED ORAL at 11:14

## 2017-03-12 RX ADMIN — ASPIRIN 81 MG 81 MG: 81 TABLET ORAL at 11:14

## 2017-03-12 RX ADMIN — POTASSIUM CHLORIDE 20 MEQ: 20 TABLET, EXTENDED RELEASE ORAL at 11:14

## 2017-03-12 RX ADMIN — PANTOPRAZOLE SODIUM 40 MG: 40 TABLET, DELAYED RELEASE ORAL at 11:14

## 2017-03-12 RX ADMIN — SUCRALFATE 1 G: 1 SUSPENSION ORAL at 17:40

## 2017-03-12 RX ADMIN — INSULIN LISPRO 15 UNITS: 100 INJECTION, SOLUTION INTRAVENOUS; SUBCUTANEOUS at 12:59

## 2017-03-12 RX ADMIN — Medication 400 MG: at 11:14

## 2017-03-12 RX ADMIN — FLUCONAZOLE 100 MG: 100 TABLET ORAL at 11:14

## 2017-03-12 RX ADMIN — INSULIN LISPRO 4 UNITS: 100 INJECTION, SOLUTION INTRAVENOUS; SUBCUTANEOUS at 07:00

## 2017-03-12 RX ADMIN — MICONAZOLE NITRATE 1 APPLICATOR: 20 CREAM VAGINAL at 04:30

## 2017-03-12 RX ADMIN — LORAZEPAM 0.5 MG: 0.5 TABLET ORAL at 17:41

## 2017-03-12 RX ADMIN — AMLODIPINE BESYLATE 10 MG: 5 TABLET ORAL at 11:19

## 2017-03-12 RX ADMIN — INSULIN LISPRO 15 UNITS: 100 INJECTION, SOLUTION INTRAVENOUS; SUBCUTANEOUS at 17:41

## 2017-03-12 RX ADMIN — Medication 1000 UNITS: at 11:15

## 2017-03-12 RX ADMIN — LORAZEPAM 0.5 MG: 0.5 TABLET ORAL at 11:14

## 2017-03-12 RX ADMIN — POLYETHYLENE GLYCOL 3350 17 G: 17 POWDER, FOR SOLUTION ORAL at 11:19

## 2017-03-12 RX ADMIN — SUCRALFATE 1 G: 1 SUSPENSION ORAL at 11:15

## 2017-03-12 RX ADMIN — HEPARIN SODIUM 5000 UNITS: 5000 INJECTION, SOLUTION INTRAVENOUS; SUBCUTANEOUS at 19:22

## 2017-03-12 RX ADMIN — HEPARIN SODIUM 5000 UNITS: 5000 INJECTION, SOLUTION INTRAVENOUS; SUBCUTANEOUS at 04:30

## 2017-03-12 RX ADMIN — LORAZEPAM 0.5 MG: 0.5 TABLET ORAL at 00:04

## 2017-03-12 RX ADMIN — INSULIN LISPRO 2 UNITS: 100 INJECTION, SOLUTION INTRAVENOUS; SUBCUTANEOUS at 17:41

## 2017-03-12 RX ADMIN — CLOTRIMAZOLE AND BETAMETHASONE DIPROPIONATE: 10; .5 CREAM TOPICAL at 04:30

## 2017-03-12 RX ADMIN — PYRIDOXINE HCL TAB 50 MG 50 MG: 50 TAB at 11:14

## 2017-03-12 RX ADMIN — CEFTRIAXONE SODIUM 1 G: 1 INJECTION, POWDER, FOR SOLUTION INTRAMUSCULAR; INTRAVENOUS at 13:19

## 2017-03-12 RX ADMIN — ATORVASTATIN CALCIUM 20 MG: 20 TABLET, FILM COATED ORAL at 11:14

## 2017-03-12 RX ADMIN — HEPARIN SODIUM 5000 UNITS: 5000 INJECTION, SOLUTION INTRAVENOUS; SUBCUTANEOUS at 12:59

## 2017-03-12 NOTE — ROUTINE PROCESS
Bedside and Verbal shift change report given to A Cuca RN (oncoming nurse) by Akushik Gayathri RN (offgoing nurse). Report included the following information SBAR, Kardex, ED Summary, Procedure Summary, Intake/Output, MAR, Accordion, Recent Results and Med Rec Status.

## 2017-03-12 NOTE — PROGRESS NOTES
Problem: Mobility Impaired (Adult and Pediatric)  Goal: *Acute Goals and Plan of Care (Insert Text)  Physical Therapy Goals  Initiated 3/12/2017 and to be accomplished within 7 day(s)  1. Patient will move from supine to sit and sit to supine in bed with supervision/set-up. 2. Patient will transfer from bed to chair and chair to bed with supervision/set-up using the least restrictive device. 3. Patient will perform sit to stand with supervision/set-up. 4. Patient will ambulate with supervision/set-up for >150 feet with the least restrictive device. Outcome: Progressing Towards Goal  PHYSICAL THERAPY EVALUATION     Patient: Faye Sequeira (22 y.o. female)  Date: 3/12/2017  Primary Diagnosis: chest pain, accelerated HTN  Precautions:  Fall      ASSESSMENT :  Based on the objective data described below, Patient present to PT with functional mobility impairments with regard to bed mobility, transfers, gait, stair negotiation, balance, weakness, and overall tolerance for activity. Pt sitting up at the EOB upon entry into room stating she was too uncomfortable to be in a supine or to's position. During gait training pt ambulated a total of 80 feet with RW use, CGA demonstrating a wide LYNN and decreased mika with decreased step clearance. Left pt sitting up at the EOB with all needs met for lunch. Educated pt to call nursing staff when getting OOB or to the restroom; pt verbalized understanding. Recommend HHPT at time of discharge. Patient will benefit from skilled intervention to address the above impairments.   Patients rehabilitation potential is considered to be Good  Factors which may influence rehabilitation potential include:   [ ]         None noted  [ ]         Mental ability/status  [X]         Medical condition  [ ]         Home/family situation and support systems  [ ]         Safety awareness  [ ]         Pain tolerance/management  [ ]         Other:        PLAN :  Recommendations and Planned Interventions:  [X]           Bed Mobility Training             [X]    Neuromuscular Re-Education  [X]           Transfer Training                   [ ]    Orthotic/Prosthetic Training  [X]           Gait Training                          [ ]    Modalities  [X]           Therapeutic Exercises          [ ]    Edema Management/Control  [X]           Therapeutic Activities            [X]    Patient and Family Training/Education  [ ]           Other (comment):     Frequency/Duration: Patient will be followed by physical therapy daily to address goals. Discharge Recommendations: Home Health  Further Equipment Recommendations for Discharge: rolling walker       SUBJECTIVE:   Patient stated I am NOT going to do dialysis no matter what the doctors tell me.       OBJECTIVE DATA SUMMARY:       Past Medical History:   Diagnosis Date    Anxiety      Arthritis      CAD (coronary artery disease)       Minor    Chest pain, unspecified 1/28/2011    Diabetes ([facility] 1964     Adult onset for 39 years    Essential hypertension      Hiatal hernia      Hyperlipidemia      Hypothyroidism      Systolic hypertension       Past Surgical History:   Procedure Laterality Date    CARDIAC SURG PROCEDURE UNLIST         three cardiac stents     HX CAROTID ENDARTERECTOMY        HX CHOLECYSTECTOMY        HX HYSTERECTOMY        NM RADIONUCLIDE ABLATION THERAPY         Barriers to Learning/Limitations: None  Compensate with: visual, verbal, tactile, kinesthetic cues/model  Prior Level of Function/Home Situation: Pt stated she uses a RW at [facility] and is receiving therapy there. Home Situation  [address] Name: [place]  One/Two Story Residence: One story  Living Alone: No  Support Systems: Assisted living  Patient Expects to be Discharged to[de-identified] Assisted living  Current DME Used/Available at Home: ned Darling  Critical Behavior:  Neurologic State: Alert; Appropriate for age  Orientation Level: Appropriate for age;Oriented X4  Cognition: Appropriate decision making; Appropriate for age attention/concentration; Appropriate safety awareness; Follows commands  Safety/Judgement: Awareness of environment; Fall prevention;Good awareness of safety precautions; Insight into deficits  Psychosocial  Patient Behaviors: Calm; Cooperative; Talkative  Purposeful Interaction: Yes  Pt Identified Daily Priority: Clinical issues (comment); Communication issues (comment)  Caritas Process: Nurture loving kindness; Teaching/learning;Create healing environment  Caring Interventions: Reassure; Therapeutic modalities  Reassure: Therapeutic listening;Quiet presence;Caring rounds; Sit with patient  Therapeutic Modalities: Humor; Intentional therapeutic touch  Skin Condition/Temp: Dry;Warm   Skin Integrity: Excoriation (groin)  Skin Integumentary  Skin Color: Appropriate for ethnicity  Skin Condition/Temp: Dry;Warm  Skin Integrity: Excoriation (groin)  Turgor: Non-tenting  Hair Growth: Present  Varicosities: Absent   Strength:    Strength: Generally decreased, functional   Tone & Sensation:    Sensation: Impaired   Range Of Motion:  AROM: Generally decreased, functional   PROM: Within functional limits   Functional Mobility:  Transfers:  Sit to Stand: Contact guard assistance; Additional time  Stand to Sit: Contact guard assistance; Additional time  Balance:   Sitting: Intact  Standing: Intact; With support  Ambulation/Gait Training:  Distance (ft): 80 Feet (ft)  Assistive Device: Walker, rolling;Gait belt  Ambulation - Level of Assistance: Contact guard assistance   Gait Description (WDL): Exceptions to WDL  Gait Abnormalities: Decreased step clearance   Base of Support: Widened  Stance: Time  Speed/Rhona: Pace decreased (<100 feet/min)  Step Length: Right shortened;Left shortened   Interventions: Visual/Demos; Verbal cues; Tactile cues; Safety awareness training   Therapeutic Exercises:   HEP included ankle pumps and LAQ x 5 reps  Pain:  Pain Scale 1: Numeric (0 - 10)  Pain Intensity 1: 0   Activity Tolerance:   Fair+  Please refer to the flowsheet for vital signs taken during this treatment. After treatment:   [ ]         Patient left in no apparent distress sitting up in chair  [X]         Patient left in no apparent distress in bed  [X]         Call bell left within reach  [X]         Nursing notified  [ ]         Caregiver present  [ ]         Bed alarm activated      COMMUNICATION/EDUCATION:   [X]         Fall prevention education was provided and the patient/caregiver indicated understanding. [X]         Patient/family have participated as able in goal setting and plan of care. [X]         Patient/family agree to work toward stated goals and plan of care. [ ]         Patient understands intent and goals of therapy, but is neutral about his/her participation. [ ]         Patient is unable to participate in goal setting and plan of care. Thank you for this referral.  Jovan Cain, DPT      Time Calculation: 11 mins    Mobility:  Current  CI= 1-19%   Goal  CI= 1-19%. The severity rating is based on the Other Level of assistance required or functional mobility.

## 2017-03-12 NOTE — PROGRESS NOTES
Hospitalist Progress Note    Patient: Radha Haque MRN: [id number]  CSN: [id number]    YOB: 1932  Age: 80 y.o.   Sex: female    DOA: 3/10/2017 LOS:  LOS: 0 days          Chief Complaint:    SOB      Assessment/Plan     Acute on chronic diastolic CHF-SOB upon admission, that is resolved, stp lasix as Cr elevated-but overall looks like Cr varies in this range, seen by nephro for CKD stage 4  Echo shows grade 2 diastolic failure  Hypertensive urgency-improved-but HR trending down, hold catpares and lopressor stopped  Anxiety-ativan scheduled, per her routine meds  Poss constipation-Abd xray orderd this am for pain-nonobst pattern seen-add miralax  Bradycardia-as above  Diabetes with renal disease  CAD-no chest pain-on scheduled chronic meds  HTN  CKD stage 4-as Cr elevated from admission, hold lasix, K as it is up to 5.5, as well as diovan for now, repeat BMP in am, and encourage PO fluids for now-I also d/c-ed HCTZ-would not resume with her Chronic renal failure    Discussed with nurse  She lost IV this am and refused another-give dose of levaquin PO  Insert new IV by this afternoon'  Monitor on tele  Watch HR and BP  prob d/c back to [facility] tomorrow    Patient Active Problem List   Diagnosis Code    Chest pain, unspecified R07.9    Shortness of breath R06.02    Angina pectoris ([facility] I20.9    CAD (coronary artery disease) I25.10    HTN (hypertension) I10    CHF (congestive heart failure) ([facility] I50.9    Hypertensive urgency I16.0    PAM (acute kidney injury) ([facility] N17.9    DM (diabetes mellitus) ([facility] E11.9    Elevated WBC count D72.829    Hyponatremia E87.1    CKD (chronic kidney disease) stage 4, GFR 15-29 ml/min ([facility]) N18.4       Subjective:    i have abd pain, no BM this am  Anxious and panicked about door being closed last night  'denies CP or SOB    Review of systems:    Constitutional: denies fevers, chills, myalgias  Respiratory: denies SOB, cough  Cardiovascular: denies chest pain, palpitations  Gastrointestinal: denies nausea, vomiting, diarrhea      Vital signs/Intake and Output:  Visit Vitals    /44 (BP 1 Location: Left arm, BP Patient Position: At rest)    Pulse (!) 59    Temp 97.5 °F (36.4 °C)    Resp 20    Ht 5' 3\" (1.6 m)    Wt 95.2 kg (209 lb 12.8 oz)    SpO2 95%    BMI 37.16 kg/m2     Current Shift:  03/12 0701 - 03/12 1900  In: 240 [P.O.:240]  Out: -   Last three shifts:  03/10 1901 - 03/12 0700  In: 240 [P.O.:240]  Out: 850 [Urine:850]    Exam:    General: obese WF, anxious,alert, NAD, OX3  Head/Neck: NCAT, supple, No masses, No lymphadenopathy  CVS:Regular rate and rhythm, no M/R/G, S1/S2 heard, no thrill  Lungs:Clear to auscultation bilaterally, no wheezes, rhonchi, or rales  Abdomen: Soft, Nontender, No distention, Normal Bowel sounds, No hepatomegaly  Extremities: No C/C/E, pulses palpable 2+  Neuro:grossly normal , follows commands  Psych:appropriate                Labs: Results:       Chemistry Recent Labs      03/12/17   0515  03/10/17   2243   GLU  189*  251*   NA  135*  136   K  5.5  5.0   CL  101  98*   CO2  28  28   BUN  47*  39*   CREA  2.54*  1.65*   CA  9.0  10.1   AGAP  6  10   BUCR  19  24*   AP  87  105   TP  6.7  7.4   ALB  3.2*  3.7   GLOB  3.5  3.7   AGRAT  0.9  1.0      CBC w/Diff Recent Labs      03/12/17   0515  03/10/17   2243   WBC  12.4  14.0*   RBC  4.75  5.15   HGB  11.4*  12.5   HCT  36.5  39.1   PLT  565*  562*   GRANS  73  74*   LYMPH  12*  14*   EOS  3  3      Cardiac Enzymes Recent Labs      03/12/17   0515  03/11/17   1335   CPK  220*  183   CKND1  0.8  1.0      Coagulation Recent Labs      03/12/17   0515  03/10/17   2243   PTP  11.8  12.0   INR  0.9  0.9   APTT  29.2  27.1       Lipid Panel Lab Results   Component Value Date/Time    Cholesterol, total 124 07/17/2016 02:21 AM    HDL Cholesterol 41 07/17/2016 02:21 AM    LDL, calculated 57.6 07/17/2016 02:21 AM    VLDL, calculated 25.4 07/17/2016 02:21 AM Triglyceride 127 07/17/2016 02:21 AM    CHOL/HDL Ratio 3.0 07/17/2016 02:21 AM      BNP No results for input(s): BNPP in the last 72 hours.    Liver Enzymes Recent Labs      03/12/17   0515   TP  6.7   ALB  3.2*   AP  87   SGOT  17      Thyroid Studies Lab Results   Component Value Date/Time    TSH 1.92 03/11/2017 04:41 AM        Procedures/imaging: see electronic medical records for all procedures/Xrays and details which were not copied into this note but were reviewed prior to creation of Migel Torres MD

## 2017-03-12 NOTE — PROGRESS NOTES
0600 Assumed care of pt from [place] [id number]. Pt resting quietly in chair , no signs of distress, call bell within reach. 0930  made aware that pt hr both on the monitor and manually have been 39-50's. Held Metoprolol. 1106 Clonidine held d/t parameters (see nursing misc) MD aware. 8207 Paged Dr. Zana Box, pt needs a new IV but started crying and states \"she doesn't want another one\" and asked for something oral.      1650 Attempted to get IV on pt twice, and Marylen Gale, RN attempted earlier this shift as well with no success. Dr. Zana Box aware, will see if pm shift can establish new IV. Shift Summary- Shift uneventful. Pt states that she did not sleep well last night, or today because she is \"a worry wart\" was more comfortably sitting in chair than bed. Ambulated to the bathroom without difficulty. Has not has a BM but states that she goes every 3-4 days. Denied pain, but was \"uncomfortable\" in her stomach. MD aware.

## 2017-03-12 NOTE — PROGRESS NOTES
0400 Pt states she feels like her blood sugar is dropping. Accucheck 176. Pt had previously refused miconazole and lotrisone and reported she did not have a vaginal yeast infection. She had denied any itching or burning in her genitals. Visually she appears to have a yeast infection. Will administer when available from the pharmacy.

## 2017-03-12 NOTE — ROUTINE PROCESS
Bedside and Verbal shift change report given to OANH Thurman RN (oncoming nurse) by Reva Herrera RN (offgoing nurse). Report included the following information SBAR, Kardex, ED Summary, Procedure Summary, Intake/Output, MAR, Accordion, Recent Results and Med Rec Status.

## 2017-03-12 NOTE — PROGRESS NOTES
TRANSFER - IN REPORT:    Verbal report received from A Ventura RN(name) on Arcadio Valdivia  being received from JOHNNY RN(unit) for routine progression of care      Report consisted of patients Situation, Background, Assessment and   Recommendations(SBAR). Information from the following report(s) SBAR, Kardex, ED Summary, Procedure Summary, Intake/Output, MAR, Accordion, Recent Results and Med Rec Status was reviewed with the receiving nurse. Opportunity for questions and clarification was provided. Assessment completed upon patients arrival to unit and care assumed. 1630 Pt transferred to 3N, was anxious about being \"in a small room\" oriented to room. States that she is having pain in her mid-epigastric area but denies anything for pain. Shift Summary- Shift uneventful. Pt able to pivot and sit in chair for meals, and then transfer back into bed. States that she had pain, but it got better when she sat in the chair.

## 2017-03-12 NOTE — ROUTINE PROCESS
Bedside and Verbal shift change report given to Estiven Way  (oncoming nurse) by Virgie Medina RN  (offgoing nurse). Report given with SBAR, Kardex, Intake/Output and Recent Results.

## 2017-03-13 VITALS
OXYGEN SATURATION: 98 % | DIASTOLIC BLOOD PRESSURE: 58 MMHG | HEIGHT: 63 IN | HEART RATE: 57 BPM | SYSTOLIC BLOOD PRESSURE: 150 MMHG | BODY MASS INDEX: 34.55 KG/M2 | RESPIRATION RATE: 18 BRPM | WEIGHT: 195 LBS | TEMPERATURE: 98.2 F

## 2017-03-13 LAB
ANION GAP BLD CALC-SCNC: 8 MMOL/L (ref 3–18)
BUN SERPL-MCNC: 49 MG/DL (ref 7–18)
BUN/CREAT SERPL: 19 (ref 12–20)
CALCIUM SERPL-MCNC: 8.6 MG/DL (ref 8.5–10.1)
CHLORIDE SERPL-SCNC: 98 MMOL/L (ref 100–108)
CO2 SERPL-SCNC: 24 MMOL/L (ref 21–32)
CREAT SERPL-MCNC: 2.55 MG/DL (ref 0.6–1.3)
ERYTHROCYTE [DISTWIDTH] IN BLOOD BY AUTOMATED COUNT: 16.7 % (ref 11.6–14.5)
GLUCOSE BLD STRIP.AUTO-MCNC: 111 MG/DL (ref 70–110)
GLUCOSE BLD STRIP.AUTO-MCNC: 171 MG/DL (ref 70–110)
GLUCOSE SERPL-MCNC: 180 MG/DL (ref 74–99)
HCT VFR BLD AUTO: 33.4 % (ref 35–45)
HGB BLD-MCNC: 10.8 G/DL (ref 12–16)
MCH RBC QN AUTO: 24.6 PG (ref 24–34)
MCHC RBC AUTO-ENTMCNC: 32.3 G/DL (ref 31–37)
MCV RBC AUTO: 76.1 FL (ref 74–97)
PLATELET # BLD AUTO: 521 K/UL (ref 135–420)
PMV BLD AUTO: 9.4 FL (ref 9.2–11.8)
POTASSIUM SERPL-SCNC: 5.2 MMOL/L (ref 3.5–5.5)
RBC # BLD AUTO: 4.39 M/UL (ref 4.2–5.3)
SODIUM SERPL-SCNC: 130 MMOL/L (ref 136–145)
WBC # BLD AUTO: 13.6 K/UL (ref 4.6–13.2)

## 2017-03-13 PROCEDURE — 82962 GLUCOSE BLOOD TEST: CPT

## 2017-03-13 PROCEDURE — 77030012891

## 2017-03-13 PROCEDURE — 74011250636 HC RX REV CODE- 250/636: Performed by: INTERNAL MEDICINE

## 2017-03-13 PROCEDURE — 80048 BASIC METABOLIC PNL TOTAL CA: CPT | Performed by: HOSPITALIST

## 2017-03-13 PROCEDURE — 74011250637 HC RX REV CODE- 250/637: Performed by: INTERNAL MEDICINE

## 2017-03-13 PROCEDURE — 96372 THER/PROPH/DIAG INJ SC/IM: CPT

## 2017-03-13 PROCEDURE — 99218 HC RM OBSERVATION: CPT

## 2017-03-13 PROCEDURE — 36415 COLL VENOUS BLD VENIPUNCTURE: CPT | Performed by: HOSPITALIST

## 2017-03-13 PROCEDURE — 85027 COMPLETE CBC AUTOMATED: CPT | Performed by: HOSPITALIST

## 2017-03-13 PROCEDURE — 74011250637 HC RX REV CODE- 250/637: Performed by: HOSPITALIST

## 2017-03-13 RX ORDER — FUROSEMIDE 40 MG/1
20 TABLET ORAL DAILY
Qty: 1 TAB | Refills: 0 | Status: SHIPPED | OUTPATIENT
Start: 2017-03-13 | End: 2017-09-03

## 2017-03-13 RX ORDER — METOPROLOL TARTRATE 50 MG/1
25 TABLET ORAL 2 TIMES DAILY
Qty: 10 TAB | Refills: 0 | Status: SHIPPED | OUTPATIENT
Start: 2017-03-13

## 2017-03-13 RX ORDER — LORAZEPAM 2 MG/ML
INJECTION INTRAMUSCULAR
Status: DISPENSED
Start: 2017-03-13 | End: 2017-03-13

## 2017-03-13 RX ORDER — LORAZEPAM 2 MG/ML
1 INJECTION INTRAMUSCULAR ONCE
Status: COMPLETED | OUTPATIENT
Start: 2017-03-13 | End: 2017-03-13

## 2017-03-13 RX ADMIN — CLOPIDOGREL BISULFATE 75 MG: 75 TABLET, FILM COATED ORAL at 08:25

## 2017-03-13 RX ADMIN — SUCRALFATE 1 G: 1 SUSPENSION ORAL at 08:25

## 2017-03-13 RX ADMIN — ISOSORBIDE MONONITRATE 30 MG: 30 TABLET, EXTENDED RELEASE ORAL at 08:24

## 2017-03-13 RX ADMIN — ATORVASTATIN CALCIUM 20 MG: 20 TABLET, FILM COATED ORAL at 08:24

## 2017-03-13 RX ADMIN — ISOSORBIDE MONONITRATE 30 MG: 30 TABLET, EXTENDED RELEASE ORAL at 00:10

## 2017-03-13 RX ADMIN — PANTOPRAZOLE SODIUM 40 MG: 40 TABLET, DELAYED RELEASE ORAL at 08:25

## 2017-03-13 RX ADMIN — Medication 400 MG: at 08:25

## 2017-03-13 RX ADMIN — LORAZEPAM 0.5 MG: 0.5 TABLET ORAL at 08:25

## 2017-03-13 RX ADMIN — Medication 1000 UNITS: at 08:25

## 2017-03-13 RX ADMIN — CLOTRIMAZOLE AND BETAMETHASONE DIPROPIONATE: 10; .5 CREAM TOPICAL at 00:10

## 2017-03-13 RX ADMIN — ANTACID TABLETS 400 MG: 500 TABLET, CHEWABLE ORAL at 12:05

## 2017-03-13 RX ADMIN — LORAZEPAM 0.5 MG: 0.5 TABLET ORAL at 00:10

## 2017-03-13 RX ADMIN — HEPARIN SODIUM 5000 UNITS: 5000 INJECTION, SOLUTION INTRAVENOUS; SUBCUTANEOUS at 12:00

## 2017-03-13 RX ADMIN — INSULIN GLARGINE 55 UNITS: 100 INJECTION, SOLUTION SUBCUTANEOUS at 00:11

## 2017-03-13 RX ADMIN — CLONIDINE HYDROCHLORIDE 0.2 MG: 0.1 TABLET ORAL at 08:25

## 2017-03-13 RX ADMIN — LEVOTHYROXINE SODIUM 150 MCG: 150 TABLET ORAL at 08:25

## 2017-03-13 RX ADMIN — PYRIDOXINE HCL TAB 50 MG 50 MG: 50 TAB at 08:24

## 2017-03-13 RX ADMIN — CLOTRIMAZOLE AND BETAMETHASONE DIPROPIONATE: 10; .5 CREAM TOPICAL at 09:00

## 2017-03-13 RX ADMIN — ASPIRIN 81 MG 81 MG: 81 TABLET ORAL at 08:24

## 2017-03-13 RX ADMIN — LORAZEPAM 1 MG: 2 INJECTION, SOLUTION INTRAMUSCULAR; INTRAVENOUS at 01:17

## 2017-03-13 RX ADMIN — SUCRALFATE 1 G: 1 SUSPENSION ORAL at 12:00

## 2017-03-13 RX ADMIN — INSULIN LISPRO 15 UNITS: 100 INJECTION, SOLUTION INTRAVENOUS; SUBCUTANEOUS at 08:27

## 2017-03-13 RX ADMIN — CLONIDINE HYDROCHLORIDE 0.2 MG: 0.1 TABLET ORAL at 00:10

## 2017-03-13 RX ADMIN — INSULIN LISPRO 2 UNITS: 100 INJECTION, SOLUTION INTRAVENOUS; SUBCUTANEOUS at 00:11

## 2017-03-13 RX ADMIN — INSULIN LISPRO 2 UNITS: 100 INJECTION, SOLUTION INTRAVENOUS; SUBCUTANEOUS at 08:27

## 2017-03-13 RX ADMIN — INSULIN LISPRO 15 UNITS: 100 INJECTION, SOLUTION INTRAVENOUS; SUBCUTANEOUS at 12:00

## 2017-03-13 RX ADMIN — AMLODIPINE BESYLATE 10 MG: 5 TABLET ORAL at 08:24

## 2017-03-13 NOTE — DIABETES MGMT
Diabetes Patient/Family Education Record    Factors That  May Influence Patients Ability  to Learn or  Comply With  Recommendations:    []   Language barrier    []   Cultural needs   [x]   Motivation    []   Cognitive limitation    []   Physical   []   Education    []   Physiological factors   []   Hearing/vision/speaking impairment   []   Episcopal beliefs    []   Financial factors   []  Other:   []  No factors identified at this time.      Person Instructed:   [x]   Patient   []   Family   []  Other     Preference for Learning:   [x]   Verbal   []   Written   []  Demonstration     Level of Comprehension & Competence:    []  Good                                      [x] Fair                                     []  Poor                             [x]  Needs Reinforcement   []  Teachback completed    Education Component:   [x]  Medication management, including confirmation of home regimen   []  Nutritional management   []  Exercise   []  Signs, symptoms, and treatment of hyperglycemia and hypoglycemia   []  Prevention, recognition and treatment of hyperglycemia and hypoglycemia   []  Importance of blood glucose monitoring and how to obtain a blood glucose meter    []  Instruction on use of blood glucose meter   [x]  Discuss the importance of HbA1C monitoring and provide patient with  results   []  Sick day guidelines   []  Proper use and disposal of lancets, needles, syringes or insulin pens (if appropriate)   []  Potential long-term complications (retinopathy, kidney disease, neuropathy, heart disease, stroke, vascular disease, foot care)   [] Provide emergency contact number and contact number for more information    []  Goal:  Patient/family will demonstrate understanding of Diabetes Self Management Skills by: (date) 3/21  Plan for post-discharge education or self-management support:    [] Outpatient class schedule provided            [] Patient Declined    [] Scheduled for outpatient classes (date) _______ Pat Zamora RN, MS  Glycemic Control Team

## 2017-03-13 NOTE — PROGRESS NOTES
0800 Assumed pt care, pt is up in chair, alert and oriented x4, SR on the monitor, lungs are clear but diminished on room air, VSS, pt denies pain. Pt states, \"if I don't see my doctor today, I am leaving on my own. \" Informed pt MD rounds daily and will see her this am. Ambulated pt back to bed, bed alarm is on, and call bell has been left within reach.

## 2017-03-13 NOTE — PROGRESS NOTES
Oral and Written notification given to patient and/or caregiver informing them that they are currently an Outpatient receiving care in our facility. Outpatient services include Observation Services.

## 2017-03-13 NOTE — PROGRESS NOTES
Patient was visited by SONJA. Volunteer followed up with patient and/or family and reported no needs to this . Chaplains will continue to follow and will provide pastoral care as needed or requested. [address], M.Div.   Board Certified   [phone] - Office

## 2017-03-13 NOTE — DISCHARGE SUMMARY
[address] [place] [address]  TRANSFER SUMMARY    Name:  Marcie Mcnulty  MR#:  [id number]  :  1932  Account #:  [de-identified]  Date of Adm:  03/10/2017  Date of Transfer:  2017      DIAGNOSES AT DISCHARGE  1. Hypertensive urgency. 2. Acute diastolic heart failure. 3. Chronic kidney disease, stage 4.  4. Hypertension. 5. Coronary artery disease. 6. Obesity. 7. Anxiety disorder. 8. Diabetes mellitus. HOSPITAL SUMMARY: The patient is an 40-year-old lady who lives at  [facility]. She is chronically debilitated. She has a  history of heart failure, coronary artery disease. She is a DO NOT  RESUSCITATE CODE status. She has hypertension, chronic kidney  disease stage 4, seen by nephrologist, and diabetes. She came into  the hospital because she had substernal constant chest pain, without  any radiation of the pain. It was accompanied with shortness of breath  and the nursing home staff asked her to rest and dinner would be  brought to her. Her pain persisted therefore staff checked her blood  pressure and noted it to be very high, greater than 142 systolic, so she  was sent to the emergency room. Her BNP was elevated. She was  noted to have pulmonary vascular congestion. She was given  nitroglycerin, labetalol and hydralazine. When she was admitted, her  blood pressure seemed to come under better control and by the time I  saw her the next morning her pain was resolved, and she was  admitted by the nocturnist in the interim. It looks like she had had  previous cardiac testing to include an echo stress test 2016, and in  review of cardiology's evaluation then she had a Lexiscan scan stress  test and at that time her nuclear stress test was negative for ischemia. She had normal systolic left ventricular function. She did have diastolic  dysfunction and her chest pain was deemed to be related to  noncardiac chest pain.  After admission here, she has had no further  chest pain, no further shortness of breath. She is very anxious and  feels closed in in her room and relates a story of posttraumatic stress  disorder from when she was a child being locked in a closet. Her  chemistries here show chronic kidney disease. It looks like in 07/2016,  her creatinine was 2.45 with a GFR of 19. On admission, her creatinine  was 1.65, but it seems to have increased, but related to her July  numbers in similar range with a GFR of 18 and creatinine of 2.55, her  potassium 5.2, sodium is 130. Five sets of cardiac markers were done  during her stay here. Her CK was within normal limits until the last one,  which is slightly elevated at 220, and her troponin initially less than  0.02, at max 0.12 and then down to 0.07, but she has had no further  chest pain. An abdominal x-ray was done yesterday for lower  abdominal pain, but it showed a nonobstructive bowel gas pattern. She  says that pain is now resolved. Chest x-ray showed no acute  cardiopulmonary process. She did receive some diuretics as there was  concern about acute diastolic heart failure here. A urinalysis was  obtained that showed trace leukocyte esterase, 0-2 WBCs and 1+  bacteria, and she has had no urinary symptomatology. Nystatin was  ordered for possible yeast infection and vaginitis. Otherwise, she has  been continued on her home medications here except her ARB was  stopped because of the concern that her creatinine is rather potentially  labile and sensitive at this point in time. She states she has an  appointment this week with her regular physician and possibly  nephrologist as well. With that, she is anxious to leave the hospital.  Today she complains of lower extremity swelling, but she says it is only  because she has taken her JEANIE hose off here. Otherwise, she denies  chest pains or shortness of breath. She states her abdominal pain is  better and she is sitting up on the side of the bed.     She is anxious in general, but in no acute distress. She is an elderly,  white female, very debilitated. Blood pressure 150/58, pulse is 57,  temperature 98.2, respiratory rate is 18, SpO2 was 98% on room air. She can be discharged from the hospital today. We did repeat her  echo here, it showed grade 2 diastolic dysfunction and an EF of 60%  to 65%. I do not think we need to repeat a stress test considering her  cardiac markers were flat, she has had no further chest pain, and her  stress test 7 months ago showed no signs of ischemia and she had  similar symptomatology then. Plan is discharge back to followup with Dr. Kenyon Blair, her PCP, this week  in 3 days, and with her nephrologist as well. She does not know his  name right now, but she believes she already has an appointment set  up with him from [place]; so, with that, will discharge her back. DISCHARGE MEDICATIONS  Should include the followin. Tylenol 650 mg 3 times daily as needed for pain. 2. Norvasc 10 mg daily. 3. Aspirin 81 mg daily. 4. Lipitor 20 mg at bedtime. 5. Calcium carbonate 2 tabs 3 times daily. 6. Vitamin D 1000 units daily. 7. Catapres 0.2 mg 3 times daily. 8. Plavix 75 mg daily. 9. Lotrisone to affected areas twice daily. 10. Benadryl 25 mg every 6 hours as needed. 11. Lasix 20 mg daily. I would recommend that we hold her hydrochlorothiazide considering  her chronic kidney disease. She can resume her:  1. Novolog 12 units with meals. 2. Lantus 55 units at night. 3. Imdur 30 mg twice daily. 4. Synthroid 150 mg daily. 5. Imodium as needed for diarrhea. 6. Ativan 0.5 mg every 8 hours as needed for anxiety. 7. Magnesium oxide 400 mg daily. 8. Antivert 12.5 mg 4 times daily as needed. 9. Melatonin 3 mg at night as needed for sleep. 10. Lopressor 50 mg twice daily.  She does run in the bradycardic  range here in the 50s, so I am going to recommend she stay off the  Lopressor for right now until followup with her regular doctor. 11. Miconazole vaginal cream as needed. 12. Ophthalmic solution called Naphcon. 13. Nitroglycerin tablets to take as needed. 14. Protonix 40 mg daily. 15. Pitavastatin 1 mg daily. 16. MiraLAX 17 grams daily. 17. K-Dur. I am going to stop for right now because her potassium is in  the 5 range. 18. Phenergan syrup as needed. 19. Vitamin B6, 50 mg daily. 20. Tramadol 50 mg every 6 hours as needed. 21. I am going to hold the Diovan right now because of her labile  creatinine. So, with that, we took her off the Lopressor because of her mild  bradycardia, held her Diovan and hydrochlorothiazide, and she would  need review of these medications as she was quite hypertensive when  she came into the hospital.    PHYSICAL EXAMINATION  VITAL SIGNS: Currently, her blood pressures between , pulse is 57-  65, temperature temperature 98.2, respiratory rate 18, SaO2 98% on  room air. LUNGS: Clear. CARDIAC: Regular rate and rhythm. ABDOMEN: Obese, soft, nontender. EXTREMITIES: Her lower extremities, 2+ edema to the mid calf  bilaterally. RECOMMENDATIONS: Hold the Lopressor until she sees her regular  physician. Continue the Norvasc. Continue her clonidine right now. Hold the Diovan. The Lopressor we probably could just to reduce the  dose of that for now to make sure she does not get to hypertensive in  the interim, so if she is at 50 mg twice a day we could modify it to 25  mg twice a day and I think that would be reasonable for right now until  she follows up with her physician. Close followup necessary. Her A1c is 7.8% here. Her last blood sugar here was 171. She is  anxious to go home today. DISCHARGE DIET: Continue diabetic diet at home. DISCHARGE TIME: 45 minutes spent on discharge today.         MD JYOTI Brar / Marisa  D:  03/13/2017   10:53  T:  03/13/2017   11:51  Job #:  [id number]

## 2017-03-13 NOTE — ROUTINE PROCESS
Bedside and Verbal shift change report given to Krystal Alberto RN  (oncoming nurse) by Nancy Miles RN  (offgoing nurse). Report given with SBAR, Kardex, Intake/Output and Recent Results.

## 2017-03-13 NOTE — DIABETES MGMT
NUTRITION ASSESSMENT / [facility] PLAN OF CARE     Bridger Ortiz           80 y. o.              Patient Active Problem List   Diagnosis Code    Chest pain, unspecified R07.9    Shortness of breath R06.02    Angina pectoris ([facility]) I20.9    CAD (coronary artery disease) I25.10    HTN (hypertension) I10    CHF (congestive heart failure) ([facility]) I50.9    Hypertensive urgency I16.0    PAM (acute kidney injury) ([facility] N17.9    DM (diabetes mellitus) ([facility] E11.9    Elevated WBC count D72.829    Hyponatremia E87.1    CKD (chronic kidney disease) stage 4, GFR 15-29 ml/min ([facility]) N18.4        INTERVENTIONS/PLAN:     - pt with known h/o DM2, on insulin at home  - basal/bolus orders in palce, recommend continue current orders, if does not d/c today, may benefit in slight adjustment in regimen to optimize glycemic control  - education given (3/13), pt was difficult to keep on track, was very focussed on \"getting out of here\"  - encouraged OP DM Self Management Class (schedule given)       ASSESSMENT:   Nutritional Status: excellent PO intake, obesity (BMI 35%), inconsistent CHO intake       Diabetes Management:     - TDD: 106 (lantus 55 units + Humalog 45 units AC TID + Humalog 6 units corrective coverage)  - BG range: 135 -214 mg/dl  - hypo: none      Lab Results   Component Value Date/Time     (H) 03/13/2017 05:35 AM    GLUCPOC 111 (H) 03/13/2017 11:48 AM    GLUCPOC 171 (H) 03/13/2017 06:46 AM    GLUCPOC 170 (H) 03/12/2017 09:39 PM             Within target range (non-ICU: <140; ICU<180): [] Yes   [x]  No    Current Insulin regimen:   - Lantus 55 units daily  - Humalog 15 units qac  - Humalog Normal Insulin Sensitivity Corrective Coverage    Home medication/insulin regimen:   - Lantus 55 units daily  - Humalog sliding scale starting at 12 units qac    HbA1c: equivalent  to ave BGlucose of 177 mg/dl for 2-3 months prior to admission    Lab Results   Component Value Date/Time    Hemoglobin A1c 7.8 03/10/2017 10:43 PM       Adequate glycemic control PTA:  [x] Yes  [] No * within recommended range for age + co-morbids       SUBJECTIVE/OBJECTIVE:   Information obtained from: pt, confirmed home regimen (see above), reports eats \"enough to stay alive\", Nursing home helps with her care regimen    Diet:   Active Orders   Diet    DIET DIABETIC CONSISTENT CARB Regular; 2 GM NA (House Low NA); FR 1500ML; AHA-LOW-CHOL FAT       Patient Vitals for the past 100 hrs:   % Diet Eaten   03/13/17 0840 100 %   03/12/17 1905 100 %   03/12/17 1301 75 %   03/12/17 0845 100 %   03/11/17 1809 100 %   03/11/17 1630 0 %         Medications: [x]                Reviewed        Labs:   Lab Results   Component Value Date/Time    Sodium 130 03/13/2017 05:35 AM    Potassium 5.2 03/13/2017 05:35 AM    Chloride 98 03/13/2017 05:35 AM    CO2 24 03/13/2017 05:35 AM    Anion gap 8 03/13/2017 05:35 AM    Glucose 180 03/13/2017 05:35 AM    BUN 49 03/13/2017 05:35 AM    Creatinine 2.55 03/13/2017 05:35 AM    Calcium 8.6 03/13/2017 05:35 AM    Albumin 3.2 03/12/2017 05:15 AM       Anthropometrics: IBW : 57.6 kg (127 lb), % IBW (Calculated): 153.54 %, BMI (calculated): 34.6  Wt Readings from Last 1 Encounters:   03/13/17 88.5 kg (195 lb)    Ht Readings from Last 1 Encounters:   03/13/17 5' 3\" (1.6 m)       Estimated Nutrition Needs: 1450 Kcals/day (25 kcal/kg), Protein (g): 70 g (1.2 g/kg) Fluid (ml): 1450 ml (1 ml/kcal)  Based on:   []          Actual BW    [x]          IBW   []            Adjusted BW        Intake:   Patient Vitals for the past 100 hrs:   % Diet Eaten   03/13/17 0840 100 %   03/12/17 1905 100 %   03/12/17 1301 75 %   03/12/17 0845 100 %   03/11/17 1809 100 %   03/11/17 1630 0 %         Nutrition Diagnoses:   1. Nutrition Diagnosis 1: altered nutrition-related lab values Related to: DM2 Nutrition Diagnosis 1 Evidenced By: A1C of 7.8%.     2. Nutrition Diagnosis 2: Overweight/obesity Nutrition Diagnosis 2 Related to: h/o excessive energy intake Nutrition Diagnosis 2 Evidenced By: BMI of 35%      Nutrition Interventions:   Intervention :Food/Nutrient Delivery: Yes  Meals/Snacks: General/healthful diet (Consistent CHO)  Intervention: Nutrition Education: Yes  Intervention: Nutrition Counseling: Yes  Intervention: Coordination of Nutrition Care: Yes  Goal: BG will be within target of  mg/dl by 3/18/17, PO intake will average at least 75% of meals offered by 3/18/17, prevent wt gain by 3/18/17  Recommended Diet/Supplements: Continue current diet    Goal: Glucose will be within target range. Intervention :Food/Nutrient Delivery: Yes  Meals/Snacks: General/healthful diet (Consistent CHO)  Intervention: Nutrition Education: Yes  Intervention: Nutrition Counseling: Yes  Intervention: Coordination of Nutrition Care: Yes  Goal: BG will be within target of  mg/dl by 3/18/17, PO intake will average at least 75% of meals offered by 3/18/17, prevent wt gain by 3/18/17  Recommended Diet/Supplements: Continue current diet     Nutrition Monitoring and Evaluation      [x]     Monitor po intake on meal rounds  [x]     Continue inpatient monitoring and intervention  []     Other:      Nutrition Risk:  []   High     []  Moderate    [x]  Minimal/Uncompromised    CHARITY Colón, MPH, RD

## 2017-03-13 NOTE — PROGRESS NOTES
Patient up in chair on entry, very drowsy, able to arouse only briefly, declined PT stating she was too tired and was waiting for Dr Paula Terrell. Pt declined attempt to assist back to bed. Notified nurse Herber Lopez. Will attempt later in day when pt more rested and alert.     German Chong, MS, PT

## 2017-03-13 NOTE — DISCHARGE INSTRUCTIONS
DISCHARGE SUMMARY from Nurse    The following personal items are in your possession at time of discharge:    Dental Appliances: Uppers  Visual Aid: None     Home Medications: None  Jewelry: None  Clothing: At bedside  Other Valuables:  (bag)             PATIENT INSTRUCTIONS:    After general anesthesia or intravenous sedation, for 24 hours or while taking prescription Narcotics:  · Limit your activities  · Do not drive and operate hazardous machinery  · Do not make important personal or business decisions  · Do  not drink alcoholic beverages  · If you have not urinated within 8 hours after discharge, please contact your surgeon on call. Report the following to your surgeon:  · Excessive pain, swelling, redness or odor of or around the surgical area  · Temperature over 100.5  · Nausea and vomiting lasting longer than 4 hours or if unable to take medications  · Any signs of decreased circulation or nerve impairment to extremity: change in color, persistent  numbness, tingling, coldness or increase pain  · Any questions        What to do at Home:  Recommended activity: Activity as tolerated      *  Please give a list of your current medications to your Primary Care Provider. *  Please update this list whenever your medications are discontinued, doses are      changed, or new medications (including over-the-counter products) are added. *  Please carry medication information at all times in case of emergency situations. These are general instructions for a healthy lifestyle:    No smoking/ No tobacco products/ Avoid exposure to second hand smoke    Surgeon General's Warning:  Quitting smoking now greatly reduces serious risk to your health.     Obesity, smoking, and sedentary lifestyle greatly increases your risk for illness    A healthy diet, regular physical exercise & weight monitoring are important for maintaining a healthy lifestyle    You may be retaining fluid if you have a history of heart failure or if you experience any of the following symptoms:  Weight gain of 3 pounds or more overnight or 5 pounds in a week, increased swelling in our hands or feet or shortness of breath while lying flat in bed. Please call your doctor as soon as you notice any of these symptoms; do not wait until your next office visit. Recognize signs and symptoms of STROKE:    F-face looks uneven    A-arms unable to move or move unevenly    S-speech slurred or non-existent    T-time-call 911 as soon as signs and symptoms begin-DO NOT go       Back to bed or wait to see if you get better-TIME IS BRAIN. Warning Signs of HEART ATTACK     Call 911 if you have these symptoms:   Chest discomfort. Most heart attacks involve discomfort in the center of the chest that lasts more than a few minutes, or that goes away and comes back. It can feel like uncomfortable pressure, squeezing, fullness, or pain.  Discomfort in other areas of the upper body. Symptoms can include pain or discomfort in one or both arms, the back, neck, jaw, or stomach.  Shortness of breath with or without chest discomfort.  Other signs may include breaking out in a cold sweat, nausea, or lightheadedness. Don't wait more than five minutes to call 911 - MINUTES MATTER! Fast action can save your life. Calling 911 is almost always the fastest way to get lifesaving treatment. Emergency Medical Services staff can begin treatment when they arrive -- up to an hour sooner than if someone gets to the hospital by car. The discharge information has been reviewed with the patient. The patient verbalized understanding. Discharge medications reviewed with the patient and appropriate educational materials and side effects teaching were provided.       Patient armband removed and shredded

## 2017-03-13 NOTE — PROGRESS NOTES
[id number] Shift Summary: Pt experienced anxiety and restlessness overnight. Administered scheduled 0.5mg PO ativan at 0010 but it was not effective. Called Dr Bozena Kenny and received an order for an additional 1mg IV ativan x1. Pt became slightly drowsy but would only sleep for short periods of time. She moved frequently from the bed to the chair and alarms were in constant use. She refused to follow directions to call for assistance before ambulating but called on the call bell dozens of times throughout the night for other reasons. Staff spent much of the night in the room with the patient. Other than anxiety and restlessness no new clinical concerns noted.

## 2017-03-13 NOTE — DIABETES MGMT
NUTRITION / GLYCEMIC CONTROL PLAN OF CARE        Diabetes Management:      - recommend: continue basal/bolus + POCT   - education: confirmation of home regimen and A1C results  - goals:   *BG in target range (non-ICU: <140 by 3/18   *PO intake will be 75% of meals offered by 3/18     - TDD: 106 (lantus 55 units + Humalog 45 units AC TID + Humalog 6 units corrective coverage)  - BG range: 135 -214 mg/dl  - Hypo: no  - BG in target range (non-ICU: <140; ICU<180): [] Yes  [x] No  - Steroids: no  - Intake: good   Patient Vitals for the past 100 hrs:   % Diet Eaten   03/13/17 0840 100 %   03/12/17 1905 100 %   03/12/17 1301 75 %   03/12/17 0845 100 %   03/11/17 1809 100 %   03/11/17 1630 0 %        Current Insulin regimen:   Lantus 55 units daily  Humalog 15 units TID AC  Humalog corrective coverage    Home medication/insulin regimen:  Lantus 55 units daily  Humalog sliding scale starting at 12 units     Recent Glucose Results: Lab Results   Component Value Date/Time     (H) 03/13/2017 05:35 AM    GLUCPOC 171 (H) 03/13/2017 06:46 AM    GLUCPOC 170 (H) 03/12/2017 09:39 PM    GLUCPOC 195 (H) 03/12/2017 04:06 PM         Adequate glycemic control PTA:  [] Yes  [x] No    HbA1c: equivalent  to ave BGlucose of 177 mg/dl for 2-3 months prior to admission    Lab Results   Component Value Date/Time    Hemoglobin A1c 7.8 03/10/2017 10:43 PM       Diet:   Active Orders   Diet    DIET DIABETIC CONSISTENT CARB Regular; 2 GM NA (House Low NA); FR 1500ML; AHA-LOW-CHOL FAT       Keli Gonzales RN, MS  Glycemic Control Note

## 2017-03-13 NOTE — PROGRESS NOTES
Patient discharged will be returning back to [facility] [address] [address] [phone], cm notified Facility and spoke with nursing supervisor Milly Dinh, pt will be calling a friend for transport back,please send avs,D/C summary and medication hard scripts with patient,RN call report prior to pt leaving [address] completed for [facility] [phone]. Care Management Interventions  PCP Verified by CM: Yes  Palliative Care Consult (Criteria: CHF and RRAT>21): No  Reason for No Palliative Care Consult:  Other (see comment)  Mode of Transport at Discharge: Self (friend)  Transition of Care Consult (CM Consult): [address]: No  Reason Outside [place]: Patient already serviced by other home care/hospice agency  Discharge Durable Medical Equipment: No  Health Maintenance Reviewed: Yes  Physical Therapy Consult: Yes  Occupational Therapy Consult: No  Speech Therapy Consult: No  Current Support Network: Assisted Living  Confirm Follow Up Transport: Self  Plan discussed with Pt/Family/Caregiver: Yes  Freedom of Choice Offered: Yes  Discharge Location  Discharge Placement: Home with home health

## 2017-03-16 LAB
ATRIAL RATE: 104 BPM
ATRIAL RATE: 64 BPM
ATRIAL RATE: 72 BPM
CALCULATED P AXIS, ECG09: 33 DEGREES
CALCULATED P AXIS, ECG09: 40 DEGREES
CALCULATED P AXIS, ECG09: 49 DEGREES
CALCULATED R AXIS, ECG10: -28 DEGREES
CALCULATED R AXIS, ECG10: -39 DEGREES
CALCULATED R AXIS, ECG10: -40 DEGREES
CALCULATED T AXIS, ECG11: 104 DEGREES
CALCULATED T AXIS, ECG11: 107 DEGREES
CALCULATED T AXIS, ECG11: 93 DEGREES
DIAGNOSIS, 93000: NORMAL
P-R INTERVAL, ECG05: 182 MS
P-R INTERVAL, ECG05: 192 MS
P-R INTERVAL, ECG05: 194 MS
Q-T INTERVAL, ECG07: 352 MS
Q-T INTERVAL, ECG07: 398 MS
Q-T INTERVAL, ECG07: 424 MS
QRS DURATION, ECG06: 102 MS
QRS DURATION, ECG06: 104 MS
QRS DURATION, ECG06: 98 MS
QTC CALCULATION (BEZET), ECG08: 435 MS
QTC CALCULATION (BEZET), ECG08: 437 MS
QTC CALCULATION (BEZET), ECG08: 462 MS
VENTRICULAR RATE, ECG03: 104 BPM
VENTRICULAR RATE, ECG03: 64 BPM
VENTRICULAR RATE, ECG03: 72 BPM

## 2017-07-31 ENCOUNTER — HOSPITAL ENCOUNTER (EMERGENCY)
Age: 82
Discharge: NURSING, REHAB OR LONG-TERM CARE | End: 2017-08-01
Attending: EMERGENCY MEDICINE
Payer: MEDICARE

## 2017-07-31 ENCOUNTER — APPOINTMENT (OUTPATIENT)
Dept: CT IMAGING | Age: 82
End: 2017-07-31
Attending: EMERGENCY MEDICINE
Payer: MEDICARE

## 2017-07-31 DIAGNOSIS — S00.03XA SCALP CONTUSION: Primary | ICD-10-CM

## 2017-07-31 DIAGNOSIS — R73.9 HYPERGLYCEMIA: ICD-10-CM

## 2017-07-31 DIAGNOSIS — B37.31 YEAST VAGINITIS: ICD-10-CM

## 2017-07-31 DIAGNOSIS — S80.212A KNEE ABRASION, LEFT, INITIAL ENCOUNTER: ICD-10-CM

## 2017-07-31 LAB
ANION GAP BLD CALC-SCNC: 12 MMOL/L (ref 3–18)
BASOPHILS # BLD AUTO: 0.1 K/UL (ref 0–0.06)
BASOPHILS # BLD: 1 % (ref 0–2)
BUN SERPL-MCNC: 54 MG/DL (ref 7–18)
BUN/CREAT SERPL: 24 (ref 12–20)
CALCIUM SERPL-MCNC: 9.4 MG/DL (ref 8.5–10.1)
CHLORIDE SERPL-SCNC: 97 MMOL/L (ref 100–108)
CO2 SERPL-SCNC: 29 MMOL/L (ref 21–32)
CREAT SERPL-MCNC: 2.29 MG/DL (ref 0.6–1.3)
DIFFERENTIAL METHOD BLD: ABNORMAL
EOSINOPHIL # BLD: 0.3 K/UL (ref 0–0.4)
EOSINOPHIL NFR BLD: 2 % (ref 0–5)
ERYTHROCYTE [DISTWIDTH] IN BLOOD BY AUTOMATED COUNT: 18.7 % (ref 11.6–14.5)
GLUCOSE SERPL-MCNC: 318 MG/DL (ref 74–99)
HCT VFR BLD AUTO: 36.3 % (ref 35–45)
HGB BLD-MCNC: 11.7 G/DL (ref 12–16)
LYMPHOCYTES # BLD AUTO: 12 % (ref 21–52)
LYMPHOCYTES # BLD: 1.5 K/UL (ref 0.9–3.6)
MCH RBC QN AUTO: 24 PG (ref 24–34)
MCHC RBC AUTO-ENTMCNC: 32.2 G/DL (ref 31–37)
MCV RBC AUTO: 74.4 FL (ref 74–97)
MONOCYTES # BLD: 1.1 K/UL (ref 0.05–1.2)
MONOCYTES NFR BLD AUTO: 9 % (ref 3–10)
NEUTS SEG # BLD: 10.1 K/UL (ref 1.8–8)
NEUTS SEG NFR BLD AUTO: 76 % (ref 40–73)
PLATELET # BLD AUTO: 578 K/UL (ref 135–420)
PMV BLD AUTO: 8.6 FL (ref 9.2–11.8)
POTASSIUM SERPL-SCNC: 4.6 MMOL/L (ref 3.5–5.5)
RBC # BLD AUTO: 4.88 M/UL (ref 4.2–5.3)
SODIUM SERPL-SCNC: 138 MMOL/L (ref 136–145)
WBC # BLD AUTO: 13.1 K/UL (ref 4.6–13.2)

## 2017-07-31 PROCEDURE — 80048 BASIC METABOLIC PNL TOTAL CA: CPT | Performed by: EMERGENCY MEDICINE

## 2017-07-31 PROCEDURE — 70450 CT HEAD/BRAIN W/O DYE: CPT

## 2017-07-31 PROCEDURE — 51701 INSERT BLADDER CATHETER: CPT

## 2017-07-31 PROCEDURE — 85025 COMPLETE CBC W/AUTO DIFF WBC: CPT | Performed by: EMERGENCY MEDICINE

## 2017-07-31 PROCEDURE — 99285 EMERGENCY DEPT VISIT HI MDM: CPT

## 2017-08-01 ENCOUNTER — APPOINTMENT (OUTPATIENT)
Dept: GENERAL RADIOLOGY | Age: 82
End: 2017-08-01
Attending: EMERGENCY MEDICINE
Payer: MEDICARE

## 2017-08-01 VITALS
OXYGEN SATURATION: 93 % | TEMPERATURE: 98.3 F | HEIGHT: 62 IN | BODY MASS INDEX: 33.49 KG/M2 | RESPIRATION RATE: 21 BRPM | SYSTOLIC BLOOD PRESSURE: 167 MMHG | HEART RATE: 85 BPM | DIASTOLIC BLOOD PRESSURE: 80 MMHG | WEIGHT: 182 LBS

## 2017-08-01 PROCEDURE — 72100 X-RAY EXAM L-S SPINE 2/3 VWS: CPT

## 2017-08-01 PROCEDURE — 77030011943

## 2017-08-01 PROCEDURE — 74011250637 HC RX REV CODE- 250/637: Performed by: EMERGENCY MEDICINE

## 2017-08-01 PROCEDURE — 73564 X-RAY EXAM KNEE 4 OR MORE: CPT

## 2017-08-01 RX ORDER — NYSTATIN AND TRIAMCINOLONE ACETONIDE 100000; 1 [USP'U]/G; MG/G
OINTMENT TOPICAL 2 TIMES DAILY
Qty: 30 G | Refills: 0 | Status: SHIPPED | OUTPATIENT
Start: 2017-08-01

## 2017-08-01 RX ORDER — TRAMADOL HYDROCHLORIDE 50 MG/1
100 TABLET ORAL
Status: COMPLETED | OUTPATIENT
Start: 2017-08-01 | End: 2017-08-01

## 2017-08-01 RX ADMIN — TRAMADOL HYDROCHLORIDE 100 MG: 50 TABLET, FILM COATED ORAL at 02:34

## 2017-08-01 NOTE — ED PROVIDER NOTES
HPI Comments: 11:26 PM    Amee Kaufman is a 80 y.o. female with pertinent PMHx of CAD, HTN, hypothyroidism, HLD, anxiety, arthritis, DM, carotid endarterectomy, cardiac stents, cholecystectomy and hysterectomy presenting via EMS to the ED c/o generalized myalgias, left knee pain, and abrasion to her posterior head s/p mechanical ground level fall. Pt mechanically fell after tripping on her nightgown. EMS states that the pt's fall was unwitnessed, but a nurse in her facility heard her fall. Pt denies any LOC. Pt's blood sugar was elevated to ~400 en route to the ED. PCP: Vera Purvis MD  Social Hx: - tobacco use, - alcohol use, - illicit drug use    There are no other complaints, changes, or physical findings at this time. The history is provided by the patient. No  was used. Past Medical History:   Diagnosis Date    Anxiety     Arthritis     CAD (coronary artery disease)     Minor    Chest pain, unspecified 1/28/2011    Diabetes ([facility] [address] 1964    Adult onset for 39 years    Essential hypertension     Hiatal hernia     Hyperlipidemia     Hypothyroidism     Systolic hypertension        Past Surgical History:   Procedure Laterality Date    CARDIAC SURG PROCEDURE UNLIST      three cardiac stents     HX CAROTID ENDARTERECTOMY      HX CHOLECYSTECTOMY      HX HYSTERECTOMY      NM RADIONUCLIDE ABLATION THERAPY           History reviewed. No pertinent family history. Social History     Social History    Marital status:      Spouse name: N/A    Number of children: N/A    Years of education: N/A     Occupational History    Not on file.      Social History Main Topics    Smoking status: Former Smoker     Packs/day: 1.00    Smokeless tobacco: Not on file    Alcohol use No    Drug use: No    Sexual activity: Not on file     Other Topics Concern    Not on file     Social History Narrative         ALLERGIES: Review of patient's allergies indicates no known allergies. Review of Systems   Constitutional: Negative for chills and fever. Gastrointestinal: Negative for abdominal pain. Musculoskeletal: Positive for arthralgias (left knee) and myalgias (diffuse). Skin: Positive for wound (posterior head with abrasion). All other systems reviewed and are negative. Vitals:    07/31/17 2331 08/01/17 0030 08/01/17 0300 08/01/17 0330   BP: 177/51 162/47 142/50 167/80   Pulse: 77 78 67 85   Resp: 18 15 15 21   Temp: 98.3 °F (36.8 °C)      SpO2: 97% 98% 93%    Weight: 82.6 kg (182 lb)      Height: 5' 2\" (1.575 m)               Physical Exam   Constitutional: She is oriented to person, place, and time. No distress. chronically ill looking female in NAD     HENT:   Head: Normocephalic. Contusion right parietal     Eyes: EOM are normal. Pupils are equal, round, and reactive to light. Neck: Normal range of motion. Neck supple. Erythematous firm non TTP mass to the submandibular area   Cardiovascular: Normal rate, normal heart sounds and intact distal pulses. Pulmonary/Chest: Effort normal and breath sounds normal. No respiratory distress. She has no wheezes. She has no rales. Abdominal: Soft. Bowel sounds are normal. There is no tenderness. obese abdomen, but no TTP   Musculoskeletal: Normal range of motion. She exhibits no edema. Diffuse effusion to the left knee   Neurological: She is alert and oriented to person, place, and time. Skin: Skin is warm and dry. No rash noted. Psychiatric: She has a normal mood and affect. Her behavior is normal.   Nursing note and vitals reviewed.      RESULTS:    CARDIAC MONITOR NOTE:  Cardiac Rhythm: NSR  Rate: 77 bpm     PULSE OXIMETRY NOTE:  Pulse-ox is 97% on RA  Interpretation: NML        2:59 AM  RADIOLOGY FINDINGS  Knee X-ray shows fracture  Pending review by Radiologist  Recorded by KATTY Dumont, as dictated by Elvi Santana MD    2:59 AM  RADIOLOGY FINDINGS  L-spine X-ray shows no fracture  Pending review by Radiologist  Recorded by Hero Humphrey ED Scribe, as dictated by Ovidio García MD    CT FINDINGS:  2:59 AM  Head CT shows no bleed  As read by the Ovidio García MD.  Recorded by Hero Humphrey ED Scribe, as dictated by Ovidio García MD      CT HEAD WO CONT   Final Result      XR SPINE LUMB TRAUMA 2 V    (Results Pending)   XR KNEE LT MIN 4 V    (Results Pending)      Labs Reviewed   CBC WITH AUTOMATED DIFF - Abnormal; Notable for the following:        Result Value    HGB 11.7 (*)     RDW 18.7 (*)     PLATELET 969 (*)     MPV 8.6 (*)     NEUTROPHILS 76 (*)     LYMPHOCYTES 12 (*)     ABS. NEUTROPHILS 10.1 (*)     ABS. BASOPHILS 0.1 (*)     All other components within normal limits   METABOLIC PANEL, BASIC - Abnormal; Notable for the following:     Chloride 97 (*)     Glucose 318 (*)     BUN 54 (*)     Creatinine 2.29 (*)     BUN/Creatinine ratio 24 (*)     GFR est AA 25 (*)     GFR est non-AA 20 (*)     All other components within normal limits       Recent Results (from the past 12 hour(s))   CBC WITH AUTOMATED DIFF    Collection Time: 07/31/17 11:32 PM   Result Value Ref Range    WBC 13.1 4.6 - 13.2 K/uL    RBC 4.88 4.20 - 5.30 M/uL    HGB 11.7 (L) 12.0 - 16.0 g/dL    HCT 36.3 35.0 - 45.0 %    MCV 74.4 74.0 - 97.0 FL    MCH 24.0 24.0 - 34.0 PG    MCHC 32.2 31.0 - 37.0 g/dL    RDW 18.7 (H) 11.6 - 14.5 %    PLATELET 587 (H) 887 - 420 K/uL    MPV 8.6 (L) 9.2 - 11.8 FL    NEUTROPHILS 76 (H) 40 - 73 %    LYMPHOCYTES 12 (L) 21 - 52 %    MONOCYTES 9 3 - 10 %    EOSINOPHILS 2 0 - 5 %    BASOPHILS 1 0 - 2 %    ABS. NEUTROPHILS 10.1 (H) 1.8 - 8.0 K/UL    ABS. LYMPHOCYTES 1.5 0.9 - 3.6 K/UL    ABS. MONOCYTES 1.1 0.05 - 1.2 K/UL    ABS. EOSINOPHILS 0.3 0.0 - 0.4 K/UL    ABS.  BASOPHILS 0.1 (H) 0.0 - 0.06 K/UL    DF AUTOMATED     METABOLIC PANEL, BASIC    Collection Time: 07/31/17 11:32 PM   Result Value Ref Range    Sodium 138 136 - 145 mmol/L    Potassium 4.6 3.5 - 5.5 mmol/L Chloride 97 (L) 100 - 108 mmol/L    CO2 29 21 - 32 mmol/L    Anion gap 12 3.0 - 18 mmol/L    Glucose 318 (H) 74 - 99 mg/dL    BUN 54 (H) 7.0 - 18 MG/DL    Creatinine 2.29 (H) 0.6 - 1.3 MG/DL    BUN/Creatinine ratio 24 (H) 12 - 20      GFR est AA 25 (L) >60 ml/min/1.73m2    GFR est non-AA 20 (L) >60 ml/min/1.73m2    Calcium 9.4 8.5 - 10.1 MG/DL          MDM  Number of Diagnoses or Management Options  Hyperglycemia:   Knee abrasion, left, initial encounter:   Scalp contusion:      Amount and/or Complexity of Data Reviewed  Clinical lab tests: ordered and reviewed  Tests in the radiology section of CPT®: ordered and reviewed (CT head  XR left knee  XR lumbar spine)  Review and summarize past medical records: yes  Independent visualization of images, tracings, or specimens: yes (CT head  XR left knee  XR lumbar spine)      ED Course     Medications   traMADol (ULTRAM) tablet 100 mg (100 mg Oral Given 8/1/17 0234)        Procedures    PROGRESS NOTE:  11:26 PM  Initial assessment performed. Written by Sarah Lomeli, ED Scribe, as dictated by Garrel Kanner, MD.    3:45 AM - While RN was putting in straight cath she noted extensive yeast vaginitis. Will treat appropriately. DISCHARGE NOTE:  3:48 AM  The patient is ready for discharge. The patient's signs, symptoms, diagnosis, and discharge instructions have been discussed and the patient and/or family has conveyed their understanding. The patient and/or family is to follow up as recommended or return to the ER should their symptoms worsen. Plan has been discussed and the patient and/or family is in agreement. Written by Marlyn Aden, ED Scribe, as dictated by Garrel Kanner, MD.     CLINICAL IMPRESSION:  1. Scalp contusion    2. Knee abrasion, left, initial encounter    3. Hyperglycemia    4. Yeast vaginitis        PLAN:  1. D/C Home    2.    Current Discharge Medication List      START taking these medications    Details   nystatin-triamcinolone (Thierry Samuel) 100,000-0.1 unit/gram-% ointment Apply  to affected area two (2) times a day. Qty: 30 g, Refills: 0             3.   Follow-up Information     Follow up With Details Comments Contact Info    Tonny Kunz MD Schedule an appointment as soon as possible for a visit in 2 days for PCP follow-up [place] [address] [facility]      THE [facility] EMERGENCY DEPT  As needed, If symptoms worsen [address] [place] [id number] [phone]          SCRIBE ATTESTATION:  This note is prepared by Maryjo Jacobs, acting as Scribe for Jessica Castillo MD.    PROVIDER ATTESTATION:  Jessica Castillo MD: The scribe's documentation has been prepared under my direction and personally reviewed by me in its entirety. I confirm that the note above accurately reflects all work, treatment, procedures, and medical decision making performed by me.

## 2017-08-01 NOTE — ED NOTES
Hourly rounding completed at this time. Safety check of room completed. Pt updated as to plan of care and expected time frame. Toileting offered. Will continue to monitor.

## 2017-08-01 NOTE — ED NOTES
Attempted to use bed pan unsuccessfully. Pt requesting to use bedside commode but states that she cannot stand to do so. Advised that bedpan will need to be used.

## 2017-08-01 NOTE — ED NOTES
Report to [place] at [facility]. States that there is an aide in the building at all times who answers call bell and can assist pt to bathroom. MD aware. Pt to be discharged. Awaiting transport.

## 2017-08-01 NOTE — DISCHARGE INSTRUCTIONS
Learning About High Blood Sugar  What is high blood sugar? Your body turns the food you eat into glucose (sugar), which it uses for energy. But if your body isn't able to use the sugar right away, it can build up in your blood and lead to high blood sugar. When the amount of sugar in your blood stays too high for too much of the time, you may have diabetes. Diabetes is a disease that can cause serious health problems. The good news is that lifestyle changes may help you get your blood sugar back to normal and avoid or delay diabetes. What causes high blood sugar? Sugar (glucose) can build up in your blood if you:  · Are overweight. · Have a family history of diabetes. · Take certain medicines, such as steroids. What are the symptoms? Having high blood sugar may not cause any symptoms at all. Or it may make you feel very thirsty or very hungry. You may also urinate more often than usual, have blurry vision, or lose weight without trying. How is high blood sugar treated? You can take steps to lower your blood sugar level if you understand what makes it get higher. Your doctor may want you to learn how to test your blood sugar level at home. Then you can see how illness, stress, or different kinds of food or medicine raise or lower your blood sugar level. Other tests may be needed to see if you have diabetes. How can you prevent high blood sugar? · Watch your weight. If you're overweight, losing just a small amount of weight may help. Reducing fat around your waist is most important. · Limit the amount of calories, sweets, and unhealthy fat you eat. Ask your doctor if a dietitian can help you. A registered dietitian can help you create meal plans that fit your lifestyle. · Get at least 30 minutes of exercise on most days of the week. Exercise helps control your blood sugar. It also helps you maintain a healthy weight. Walking is a good choice.  You also may want to do other activities, such as running, swimming, cycling, or playing tennis or team sports. · If your doctor prescribed medicines, take them exactly as prescribed. Call your doctor if you think you are having a problem with your medicine. You will get more details on the specific medicines your doctor prescribes. Follow-up care is a key part of your treatment and safety. Be sure to make and go to all appointments, and call your doctor if you are having problems. It's also a good idea to know your test results and keep a list of the medicines you take. Where can you learn more? Go to http://rachel-caro.info/. Enter O108 in the search box to learn more about \"Learning About High Blood Sugar. \"  Current as of: March 13, 2017  Content Version: 11.3  © 9869-7272 [email or web address]. Care instructions adapted under license by [employer] (which disclaims liability or warranty for this information). If you have questions about a medical condition or this instruction, always ask your healthcare professional. [place] [address] any warranty or liability for your use of this information. Scrapes (Abrasions): Care Instructions  Your Care Instructions  Scrapes (abrasions) are wounds where your skin has been rubbed or torn off. Most scrapes do not go deep into the skin, but some may remove several layers of skin. Scrapes usually don't bleed much, but they may ooze pinkish fluid. Scrapes on the head or face may appear worse than they are. They may bleed a lot because of the good blood supply to this area. Most scrapes heal well and may not need a bandage. They usually heal within 3 to 7 days. A large, deep scrape may take 1 to 2 weeks or longer to heal. A scab may form on some scrapes. Follow-up care is a key part of your treatment and safety. Be sure to make and go to all appointments, and call your doctor if you are having problems.  It's also a good idea to know your test results and keep a list of the medicines you take. How can you care for yourself at home? · If your doctor told you how to care for your wound, follow your doctor's instructions. If you did not get instructions, follow this general advice:  ¨ Wash the scrape with clean water 2 times a day. Don't use hydrogen peroxide or alcohol, which can slow healing. ¨ You may cover the scrape with a thin layer of petroleum jelly, such as Vaseline, and a nonstick bandage. ¨ Apply more petroleum jelly and replace the bandage as needed. · Prop up the injured area on a pillow anytime you sit or lie down during the next 3 days. Try to keep it above the level of your heart. This will help reduce swelling. · Be safe with medicines. Take pain medicines exactly as directed. ¨ If the doctor gave you a prescription medicine for pain, take it as prescribed. ¨ If you are not taking a prescription pain medicine, ask your doctor if you can take an over-the-counter medicine. When should you call for help? Call your doctor now or seek immediate medical care if:  · You have signs of infection, such as:  ¨ Increased pain, swelling, warmth, or redness around the scrape. ¨ Red streaks leading from the scrape. ¨ Pus draining from the scrape. ¨ A fever. · The scrape starts to bleed, and blood soaks through the bandage. Oozing small amounts of blood is normal.  Watch closely for changes in your health, and be sure to contact your doctor if the scrape is not getting better each day. Where can you learn more? Go to http://rachel-caro.info/. Enter A374 in the search box to learn more about \"Scrapes (Abrasions): Care Instructions. \"  Current as of: March 20, 2017  Content Version: 11.3  © 1897-7993 [employer]. Care instructions adapted under license by [employer] (which disclaims liability or warranty for this information).  If you have questions about a medical condition or this instruction, always ask your healthcare professional. [place] [address] any warranty or liability for your use of this information. Bruises: Care Instructions  Your Care Instructions    Bruises occur when small blood vessels under the skin tear or rupture, most often from a twist, bump, or fall. Blood leaks into tissues under the skin and causes a black-and-blue spot that often turns colors, including purplish black, reddish blue, or yellowish green, as the bruise heals. Bruises hurt, but most are not serious and will go away on their own within 2 to 4 weeks. Sometimes, gravity causes them to spread down the body. A leg bruise usually will take longer to heal than a bruise on the face or arms. Follow-up care is a key part of your treatment and safety. Be sure to make and go to all appointments, and call your doctor if you are having problems. Its also a good idea to know your test results and keep a list of the medicines you take. How can you care for yourself at home? · Take pain medicines exactly as directed. ¨ If the doctor gave you a prescription medicine for pain, take it as prescribed. ¨ If you are not taking a prescription pain medicine, ask your doctor if you can take an over-the-counter medicine. · Put ice or a cold pack on the area for 10 to 20 minutes at a time. Put a thin cloth between the ice and your skin. · If you can, prop up the bruised area on pillows as much as possible for the next few days. Try to keep the bruise above the level of your heart. When should you call for help? Call your doctor now or seek immediate medical care if:  · You have signs of infection, such as:  ¨ Increased pain, swelling, warmth, or redness. ¨ Red streaks leading from the bruise. ¨ Pus draining from the bruise. ¨ A fever. · You have a bruise on your leg and signs of a blood clot, such as:  ¨ Increasing redness and swelling along with warmth, tenderness, and pain in the bruised area.   ¨ Pain in your calf, back of the knee, thigh, or groin. ¨ Redness and swelling in your leg or groin. · Your pain gets worse. Watch closely for changes in your health, and be sure to contact your doctor if:  · You do not get better as expected. Where can you learn more? Go to http://rachel-caro.info/. Enter [phone] in the search box to learn more about \"Bruises: Care Instructions. \"  Current as of: March 20, 2017  Content Version: 11.3  © 7648-7112 [employer]. Care instructions adapted under license by [employer] (which disclaims liability or warranty for this information). If you have questions about a medical condition or this instruction, always ask your healthcare professional. [address] any warranty or liability for your use of this information. Candidiasis: Care Instructions  Your Care Instructions  Candidiasis (say \"nny-gye-PI-uh-anastasia\") is a yeast infection. Yeast normally lives in your body. But it can cause problems if your body's defenses don't work as they should. Some medicines can increase your chance of getting a yeast infection. These include antibiotics, steroids, and cancer drugs. And some diseases like AIDS and diabetes can make you more likely to get yeast infections. There are different types of yeast infections. Daryel Kelch is a yeast infection in the mouth. It usually occurs in people with weak immune systems. It causes white patches inside the mouth and throat. Yeast infections of the skin usually occur in skin folds where the skin stays moist. They cause red, oozing patches on your skin. Babies can get these infections under the diaper. People who often wear gloves can get them on their hands. Many women get vaginal yeast infections. They are most common when women take antibiotics. These infections can cause the vagina to itch and burn. They also cause white discharge that looks like cottage cheese. In rare cases, yeast infects the blood. This can cause serious disease. This kind of infection is treated with medicine given through a needle into a vein (IV). After you start treatment, a yeast infection usually goes away quickly. But if your immune system is weak, the infection may come back. Tell your doctor if you get yeast infections often. Follow-up care is a key part of your treatment and safety. Be sure to make and go to all appointments, and call your doctor if you are having problems. It's also a good idea to know your test results and keep a list of the medicines you take. How can you care for yourself at home? · Take your medicines exactly as prescribed. Call your doctor if you think you are having a problem with your medicine. · Use antibiotics only as directed by your doctor. · Eat yogurt with live cultures. It has bacteria called lactobacillus. It may help prevent some types of yeast infections. · Keep your skin clean and dry. Put powder on moist places. · If you are using a cream or suppository to treat a vaginal yeast infection, don't use condoms or a diaphragm. Use a different type of birth control. · Eat a healthy diet and get regular exercise. This will help keep your immune system strong. When should you call for help? Call your doctor now or seek immediate medical care if:  · You have a fever. · You are pregnant and have signs of a vaginal or urinary tract infection such as:  ¨ Severe itching in your vagina. ¨ Pain during sex or when you urinate. ¨ Unusual discharge from your vagina. ¨ A frequent urge to urinate. ¨ Urine that is cloudy or smells bad. Watch closely for changes in your health, and be sure to contact your doctor if:  · You do not get better as expected. Where can you learn more? Go to http://rachel-caro.info/. Enter G716 in the search box to learn more about \"Candidiasis: Care Instructions. \"  Current as of: October 13, 2016  Content Version: 11.3  © 2228-8986 [employer]. Care instructions adapted under license by [employer] (which disclaims liability or warranty for this information). If you have questions about a medical condition or this instruction, always ask your healthcare professional. [address] any warranty or liability for your use of this information.

## 2017-08-01 NOTE — ED TRIAGE NOTES
Per EMS, called to [facility] for unwitnessed fall. EMS states FS blood glucose 434. Pt states was putting on nightgown and tripped striking head on nightstand and left knee on floor. Pain to head, left knee and upper and lower back. Denies LOC  Sepsis Screening completed    (  )Patient meets SIRS criteria. (xxPatient does not meet SIRS criteria.       SIRS Criteria is achieved when two or more of the following are present   Temperature < 96.8°F (36°C) or > 100.9°F (38.3°C)   Heart Rate > 90 beats per minute   Respiratory Rate > 20 breaths per minute   WBC count > 12,000 or <4,000 or > 10% bands

## 2017-08-01 NOTE — ED NOTES
Attempted to urinate via bedpan and unable to void. States \"I can't take the pain any more, please give me something for pain\". Crying and upset, Dr. Sally Ruiz notified.

## 2017-08-01 NOTE — ED NOTES
Pt refusing to go to CT r/t pain. Explained importance of CT scan. Pt agrees. Will await CT dept being ready again as another pt was taken.

## 2017-09-03 ENCOUNTER — APPOINTMENT (OUTPATIENT)
Dept: GENERAL RADIOLOGY | Age: 82
End: 2017-09-03
Attending: EMERGENCY MEDICINE
Payer: MEDICARE

## 2017-09-03 ENCOUNTER — HOSPITAL ENCOUNTER (EMERGENCY)
Age: 82
Discharge: HOME | End: 2017-09-03
Attending: EMERGENCY MEDICINE | Admitting: EMERGENCY MEDICINE
Payer: MEDICARE

## 2017-09-03 VITALS
TEMPERATURE: 98.1 F | HEART RATE: 69 BPM | DIASTOLIC BLOOD PRESSURE: 54 MMHG | BODY MASS INDEX: 37.25 KG/M2 | SYSTOLIC BLOOD PRESSURE: 159 MMHG | OXYGEN SATURATION: 98 % | WEIGHT: 202.4 LBS | HEIGHT: 62 IN | RESPIRATION RATE: 34 BRPM

## 2017-09-03 DIAGNOSIS — N18.9 CKD (CHRONIC KIDNEY DISEASE), UNSPECIFIED STAGE: ICD-10-CM

## 2017-09-03 DIAGNOSIS — E87.1 HYPONATREMIA: ICD-10-CM

## 2017-09-03 DIAGNOSIS — R60.0 BILATERAL EDEMA OF LOWER EXTREMITY: Primary | ICD-10-CM

## 2017-09-03 DIAGNOSIS — D64.9 ANEMIA, UNSPECIFIED TYPE: ICD-10-CM

## 2017-09-03 DIAGNOSIS — F41.1 ANXIETY STATE: ICD-10-CM

## 2017-09-03 LAB
ALBUMIN SERPL-MCNC: 2.9 G/DL (ref 3.4–5)
ALBUMIN/GLOB SERPL: 0.9 {RATIO} (ref 0.8–1.7)
ALP SERPL-CCNC: 82 U/L (ref 45–117)
ALT SERPL-CCNC: 19 U/L (ref 13–56)
ANION GAP SERPL CALC-SCNC: 4 MMOL/L (ref 3–18)
AST SERPL-CCNC: 15 U/L (ref 15–37)
BASOPHILS # BLD: 0.1 K/UL (ref 0–0.06)
BASOPHILS NFR BLD: 1 % (ref 0–2)
BILIRUB SERPL-MCNC: 0.3 MG/DL (ref 0.2–1)
BNP SERPL-MCNC: 3529 PG/ML (ref 0–1800)
BUN SERPL-MCNC: 46 MG/DL (ref 7–18)
BUN/CREAT SERPL: 24 (ref 12–20)
CALCIUM SERPL-MCNC: 9.1 MG/DL (ref 8.5–10.1)
CHLORIDE SERPL-SCNC: 94 MMOL/L (ref 100–108)
CK MB CFR SERPL CALC: 2.2 % (ref 0–4)
CK MB SERPL-MCNC: 2 NG/ML (ref 5–25)
CK SERPL-CCNC: 90 U/L (ref 26–192)
CO2 SERPL-SCNC: 27 MMOL/L (ref 21–32)
CREAT SERPL-MCNC: 1.88 MG/DL (ref 0.6–1.3)
DIFFERENTIAL METHOD BLD: ABNORMAL
EOSINOPHIL # BLD: 0.3 K/UL (ref 0–0.4)
EOSINOPHIL NFR BLD: 2 % (ref 0–5)
ERYTHROCYTE [DISTWIDTH] IN BLOOD BY AUTOMATED COUNT: 18.3 % (ref 11.6–14.5)
GLOBULIN SER CALC-MCNC: 3.3 G/DL (ref 2–4)
GLUCOSE SERPL-MCNC: 180 MG/DL (ref 74–99)
HCT VFR BLD AUTO: 29.8 % (ref 35–45)
HGB BLD-MCNC: 9.7 G/DL (ref 12–16)
LYMPHOCYTES # BLD: 0.8 K/UL (ref 0.9–3.6)
LYMPHOCYTES NFR BLD: 7 % (ref 21–52)
MCH RBC QN AUTO: 23.9 PG (ref 24–34)
MCHC RBC AUTO-ENTMCNC: 32.6 G/DL (ref 31–37)
MCV RBC AUTO: 73.4 FL (ref 74–97)
MONOCYTES # BLD: 1.4 K/UL (ref 0.05–1.2)
MONOCYTES NFR BLD: 12 % (ref 3–10)
NEUTS SEG # BLD: 9.2 K/UL (ref 1.8–8)
NEUTS SEG NFR BLD: 78 % (ref 40–73)
PLATELET # BLD AUTO: 471 K/UL (ref 135–420)
PMV BLD AUTO: 8.8 FL (ref 9.2–11.8)
POTASSIUM SERPL-SCNC: 5.1 MMOL/L (ref 3.5–5.5)
PROT SERPL-MCNC: 6.2 G/DL (ref 6.4–8.2)
RBC # BLD AUTO: 4.06 M/UL (ref 4.2–5.3)
SODIUM SERPL-SCNC: 125 MMOL/L (ref 136–145)
TROPONIN I SERPL-MCNC: <0.02 NG/ML (ref 0–0.06)
WBC # BLD AUTO: 11.8 K/UL (ref 4.6–13.2)

## 2017-09-03 PROCEDURE — 74011250636 HC RX REV CODE- 250/636: Performed by: EMERGENCY MEDICINE

## 2017-09-03 PROCEDURE — 82550 ASSAY OF CK (CPK): CPT | Performed by: EMERGENCY MEDICINE

## 2017-09-03 PROCEDURE — 83880 ASSAY OF NATRIURETIC PEPTIDE: CPT | Performed by: EMERGENCY MEDICINE

## 2017-09-03 PROCEDURE — 71010 XR CHEST SNGL V: CPT

## 2017-09-03 PROCEDURE — 85025 COMPLETE CBC W/AUTO DIFF WBC: CPT | Performed by: EMERGENCY MEDICINE

## 2017-09-03 PROCEDURE — 96374 THER/PROPH/DIAG INJ IV PUSH: CPT

## 2017-09-03 PROCEDURE — 93970 EXTREMITY STUDY: CPT

## 2017-09-03 PROCEDURE — 93005 ELECTROCARDIOGRAM TRACING: CPT

## 2017-09-03 PROCEDURE — 99285 EMERGENCY DEPT VISIT HI MDM: CPT

## 2017-09-03 PROCEDURE — 80053 COMPREHEN METABOLIC PANEL: CPT | Performed by: EMERGENCY MEDICINE

## 2017-09-03 RX ORDER — LABETALOL 100 MG/1
100 TABLET, FILM COATED ORAL 2 TIMES DAILY
COMMUNITY
End: 2017-09-20

## 2017-09-03 RX ORDER — FUROSEMIDE 10 MG/ML
40 INJECTION INTRAMUSCULAR; INTRAVENOUS
Status: COMPLETED | OUTPATIENT
Start: 2017-09-03 | End: 2017-09-03

## 2017-09-03 RX ORDER — FAMOTIDINE 10 MG/ML
20 INJECTION INTRAVENOUS
Status: DISCONTINUED | OUTPATIENT
Start: 2017-09-03 | End: 2017-09-03 | Stop reason: CLARIF

## 2017-09-03 RX ORDER — FUROSEMIDE 40 MG/1
40 TABLET ORAL DAILY
Qty: 20 TAB | Refills: 0 | Status: SHIPPED | OUTPATIENT
Start: 2017-09-03 | End: 2017-09-23

## 2017-09-03 RX ADMIN — FUROSEMIDE 40 MG: 10 INJECTION, SOLUTION INTRAMUSCULAR; INTRAVENOUS at 19:10

## 2017-09-03 NOTE — ED TRIAGE NOTES
Pt brought in via ems from morning side for bilateral feet swelling x 2 days. Pt states she was taken off her lasix last week. Sepsis Screening completed    (  )Patient meets SIRS criteria. (x  )Patient does not meet SIRS criteria.       SIRS Criteria is achieved when two or more of the following are present   Temperature < 96.8°F (36°C) or > 100.9°F (38.3°C)   Heart Rate > 90 beats per minute   Respiratory Rate > 20 breaths per minute   WBC count > 12,000 or <4,000 or > 10% bands

## 2017-09-03 NOTE — ED NOTES
Patient discharged at this time. Verbalized understanding of plan of care and discharge instructions. Prescriptions given and understands how to administer at home. Arm band placed in shred it. Pt friend at bedside to provide ride home.

## 2017-09-03 NOTE — DISCHARGE INSTRUCTIONS
Chronic Kidney Disease: Care Instructions  Your Care Instructions  Chronic kidney disease happens when your kidneys don't work as well as they should. Your kidneys have a few important jobs. They remove waste from your blood. This waste leaves your body in your urine. They also balance your body's fluids and chemicals. When your kidneys don't work well, extra waste and fluid can build up. This can poison the body and sometimes cause death. The most common causes of this disease are diabetes and high blood pressure. In some cases, the disease develops in 2 to 3 months. But it usually develops over many years. If you take medicine and make healthy changes to your lifestyle, you may be able to prevent the disease from getting worse. But if your kidney damage gets worse, you may need dialysis or a kidney transplant. Dialysis uses a machine to filter waste from the blood. A transplant is surgery to give you a healthy kidney from another person. Follow-up care is a key part of your treatment and safety. Be sure to make and go to all appointments, and call your doctor if you are having problems. It's also a good idea to know your test results and keep a list of the medicines you take. How can you care for yourself at home? Treatments and appointments  · Be safe with medicines. Take your medicines exactly as prescribed. Call your doctor if you have any problems with your medicine. You also may take medicine to control your blood pressure or to treat diabetes. Many people who have diabetes take blood pressure medicine. · If you have diabetes, do your best to keep your blood sugar in your target range. You may do this by eating healthy food and exercising. You may also take medicines. · Go to your dialysis appointments if you have this treatment. · Do not take ibuprofen (Advil, Motrin), naproxen (Aleve), or similar medicines, unless your doctor tells you to. These may make the disease worse.   · Do not take any vitamins, over-the-counter medicines, or herbal products without talking to your doctor first.  · Do not smoke or use other tobacco products. Smoking can reduce blood flow to the kidneys. If you need help quitting, talk to your doctor about stop-smoking programs and medicines. These can increase your chances of quitting for good. · Do not drink alcohol or use illegal drugs. · Talk to your doctor about an exercise plan. Exercise helps lower your blood pressure. It also makes you feel better. · If you have an advance directive, let your doctor know. It may include a living will and a durable power of  for health care. If you don't have one, you may want to prepare one. It lets your doctor and loved ones know your health care wishes if you become unable to speak for yourself. Diet  · Talk to a registered dietitian. He or she can help you make a meal plan that is right for you. Most people with kidney disease need to limit salt (sodium), fluids, and protein. Some also have to limit potassium and phosphorus. · You may have to give up many foods you like. But try to focus on the fact that this will help you stay healthy for as long as possible. · If you have a hard time eating enough, talk to your doctor or dietitian about ways to add calories to your diet. · Your diet may change as your disease changes. See your doctor for regular testing. And work with a dietitian to change your diet as needed. When should you call for help? Call 911 anytime you think you may need emergency care. For example, call if:  · You passed out (lost consciousness). Call your doctor now or seek immediate medical care if:  · You have less urine than normal or no urine. · You have trouble urinating or can urinate only very small amounts. · You are confused or have trouble thinking clearly. · You feel weaker or more tired than usual.  · You are very thirsty, lightheaded, or dizzy. · You have nausea and vomiting.   · You have new swelling of your arms or feet, or your swelling is worse. · You have blood in your urine. · You have new or worse trouble breathing. Watch closely for changes in your health, and be sure to contact your doctor if:  · You have any problems with your medicine or other treatment. Where can you learn more? Go to http://rachel-caro.info/. Enter N276 in the search box to learn more about \"Chronic Kidney Disease: Care Instructions. \"  Current as of: April 3, 2017  Content Version: 11.3  © 0976-7342 [employer]. Care instructions adapted under license by [employer] (which disclaims liability or warranty for this information). If you have questions about a medical condition or this instruction, always ask your healthcare professional. [address] any warranty or liability for your use of this information. Anemia: Care Instructions  Your Care Instructions    Anemia is a low level of red blood cells, which carry oxygen throughout your body. Many things can cause anemia. Lack of iron is one of the most common causes. Your body needs iron to make hemoglobin, a substance in red blood cells that carries oxygen from the lungs to your body's cells. Without enough iron, the body produces fewer and smaller red blood cells. As a result, your body's cells do not get enough oxygen, and you feel tired and weak. And you may have trouble concentrating. Bleeding is the most common cause of a lack of iron. You may have heavy menstrual bleeding or bleeding caused by conditions such as ulcers, hemorrhoids, or cancer. Regular use of aspirin or other anti-inflammatory medicines (such as ibuprofen) also can cause bleeding in some people. A lack of iron in your diet also can cause anemia, especially at times when the body needs more iron, such as during pregnancy, infancy, and the teen years. Your doctor may have prescribed iron pills.  It may take several months of treatment for your iron levels to return to normal. Your doctor also may suggest that you eat foods that are rich in iron, such as meat and beans. There are many other causes of anemia. It is not always due to a lack of iron. Finding the specific cause of your anemia will help your doctor find the right treatment for you. Follow-up care is a key part of your treatment and safety. Be sure to make and go to all appointments, and call your doctor if you are having problems. It's also a good idea to know your test results and keep a list of the medicines you take. How can you care for yourself at home? · Take your medicines exactly as prescribed. Call your doctor if you think you are having a problem with your medicine. · If your doctor recommends iron pills, take them as directed:  ¨ Try to take the pills on an empty stomach about 1 hour before or 2 hours after meals. But you may need to take iron with food to avoid an upset stomach. ¨ Do not take antacids or drink milk or caffeine drinks (such as coffee, tea, or cola) at the same time or within 2 hours of the time that you take your iron. They can make it hard for your body to absorb the iron. ¨ Vitamin C (from food or supplements) helps your body absorb iron. Try taking iron pills with a glass of orange juice or some other food that is high in vitamin C, such as citrus fruits. ¨ Iron pills may cause stomach problems, such as heartburn, nausea, diarrhea, constipation, and cramps. Be sure to drink plenty of fluids, and include fruits, vegetables, and fiber in your diet each day. Iron pills often make your bowel movements dark or green. ¨ If you forget to take an iron pill, do not take a double dose of iron the next time you take a pill. ¨ Keep iron pills out of the reach of small children. An overdose of iron can be very dangerous. · Follow your doctor's advice about eating iron-rich foods.  These include red meat, shellfish, poultry, eggs, beans, raisins, whole-grain bread, and leafy green vegetables. · Steam vegetables to help them keep their iron content. When should you call for help? Call 911 anytime you think you may need emergency care. For example, call if:  · You have symptoms of a heart attack. These may include:  ¨ Chest pain or pressure, or a strange feeling in the chest.  ¨ Sweating. ¨ Shortness of breath. ¨ Nausea or vomiting. ¨ Pain, pressure, or a strange feeling in the back, neck, jaw, or upper belly or in one or both shoulders or arms. ¨ Lightheadedness or sudden weakness. ¨ A fast or irregular heartbeat. After you call 911, the  may tell you to chew 1 adult-strength or 2 to 4 low-dose aspirin. Wait for an ambulance. Do not try to drive yourself. · You passed out (lost consciousness). Call your doctor now or seek immediate medical care if:  · You have new or increased shortness of breath. · You are dizzy or lightheaded, or you feel like you may faint. · Your fatigue and weakness continue or get worse. · You have any abnormal bleeding, such as:  ¨ Nosebleeds. ¨ Vaginal bleeding that is different (heavier, more frequent, at a different time of the month) than what you are used to. ¨ Bloody or black stools, or rectal bleeding. ¨ Bloody or pink urine. Watch closely for changes in your health, and be sure to contact your doctor if:  · You do not get better as expected. Where can you learn more? Go to http://rachel-caro.info/. Enter R301 in the search box to learn more about \"Anemia: Care Instructions. \"  Current as of: October 13, 2016  Content Version: 11.3  © 4310-8277 [employer]. Care instructions adapted under license by [employer] (which disclaims liability or warranty for this information).  If you have questions about a medical condition or this instruction, always ask your healthcare professional. [address] any warranty or liability for your use of this information. Hyponatremia: Care Instructions  Your Care Instructions  Hyponatremia (say \"[id number]\") means that you don't have enough sodium in your blood. It can cause nausea, vomiting, and headaches. Or you may not feel hungry. In serious cases, it can cause seizures, a coma, or even death. Hyponatremia is not a disease. It is a problem caused by something else, such as medicines or exercising for a long time in hot weather. You can get hyponatremia if you lose a lot of fluids and then you drink a lot of water or other liquids that don't have much sodium. You can also get it if you have kidney, liver, heart, or other health problems. Treatment is focused on getting your sodium levels back to normal.  Follow-up care is a key part of your treatment and safety. Be sure to make and go to all appointments, and call your doctor if you are having problems. It's also a good idea to know your test results and keep a list of the medicines you take. How can you care for yourself at home? · If your doctor recommends it, drink fluids that have sodium. Sports drinks are a good choice. Or you can eat salty foods. · If your doctor recommends it, limit the amount of water you drink. And limit fluids that are mostly water. These include tea, coffee, and juice. · Take your medicines exactly as prescribed. Call your doctor if you have any problems with your medicine. · Get your sodium levels tested when your doctor tells you to. When should you call for help? Call 911 anytime you think you may need emergency care. For example, call if:  · You have a seizure. · You passed out (lost consciousness). Call your doctor now or seek immediate medical care if:  · You are confused or it is hard to focus. · You have little or no appetite. · You feel sick to your stomach or you vomit. · You have a headache. · You have mood changes.   · You feel more tired than usual.  Watch closely for changes in your health, and be sure to contact your doctor if:  · You do not get better as expected. Where can you learn more? Go to http://rachel-caro.info/. Enter I181 in the search box to learn more about \"Hyponatremia: Care Instructions. \"  Current as of: October 14, 2016  Content Version: 11.3  © 8308-5976 [employer]. Care instructions adapted under license by [employer] (which disclaims liability or warranty for this information). If you have questions about a medical condition or this instruction, always ask your healthcare professional. [place] [address] any warranty or liability for your use of this information. Leg and Ankle Edema: Care Instructions  Your Care Instructions  Swelling in the legs, ankles, and feet is called edema. It is common after you sit or stand for a while. Long plane flights or car rides often cause swelling in the legs and feet. You may also have swelling if you have to stand for long periods of time at your job. Problems with the veins in the legs (varicose veins) and changes in hormones can also cause swelling. Sometimes the swelling in the ankles and feet is caused by a more serious problem, such as heart failure, infection, blood clots, or liver or kidney disease. Follow-up care is a key part of your treatment and safety. Be sure to make and go to all appointments, and call your doctor if you are having problems. Its also a good idea to know your test results and keep a list of the medicines you take. How can you care for yourself at home? · If your doctor gave you medicine, take it as prescribed. Call your doctor if you think you are having a problem with your medicine. · Whenever you are resting, raise your legs up. Try to keep the swollen area higher than the level of your heart. · Take breaks from standing or sitting in one position. ¨ Walk around to increase the blood flow in your lower legs.   ¨ Move your feet and ankles often while you stand, or tighten and relax your leg muscles. · Wear support stockings. Put them on in the morning, before swelling gets worse. · Eat a balanced diet. Lose weight if you need to. · Limit the amount of salt (sodium) in your diet. Salt holds fluid in the body and may increase swelling. When should you call for help? Call 911 anytime you think you may need emergency care. For example, call if:  · You have symptoms of a blood clot in your lung (called a pulmonary embolism). These may include:  ¨ Sudden chest pain. ¨ Trouble breathing. ¨ Coughing up blood. Call your doctor now or seek immediate medical care if:  · You have signs of a blood clot, such as:  ¨ Pain in your calf, back of the knee, thigh, or groin. ¨ Redness and swelling in your leg or groin. · You have symptoms of infection, such as:  ¨ Increased pain, swelling, warmth, or redness. ¨ Red streaks or pus. ¨ A fever. Watch closely for changes in your health, and be sure to contact your doctor if:  · Your swelling is getting worse. · You have new or worsening pain in your legs. · You do not get better as expected. Where can you learn more? Go to http://rachel-caro.info/. Enter L337 in the search box to learn more about \"Leg and Ankle Edema: Care Instructions. \"  Current as of: March 20, 2017  Content Version: 11.3  © 5558-6826 [employer]. Care instructions adapted under license by [employer] (which disclaims liability or warranty for this information). If you have questions about a medical condition or this instruction, always ask your healthcare professional. [place] [address] any warranty or liability for your use of this information.

## 2017-09-03 NOTE — ED PROVIDER NOTES
[address]  EMERGENCY DEPARTMENT HISTORY AND PHYSICAL EXAM       Date: 9/3/2017   Patient Name: Lincoln Pichardo   YOB: 1932  Medical Record Number: [id number]    History of Presenting Illness     Chief Complaint   Patient presents with    Foot Swelling        History Provided By:  patient    Additional History:   4:38 PM  Lincoln Pichardo is a 80 y.o. female with PMHx of thyroid CA (currently receiving radiation therapy), CAD, CHF, HTN, HLD, DM presenting by EMS from [facility] to the ED C/O bilateral foot swelling, onset 2 days ago. Notes no other associated sxs. Pt states that she was on Lasix, sent to [facility] last week and was taken off. Pt requests to be put back on Lasix because of leg swelling. Pt also has neck redness and swelling due to radiation therapy. Current medications include Plavix and ASA. Denies any other symptoms or complaints. Primary Care Provider: Jessie Romero MD   Specialist:    Past History     Past Medical History:   Past Medical History:   Diagnosis Date    Anxiety     Arthritis     CAD (coronary artery disease)     Minor    Chest pain, unspecified 1/28/2011    Diabetes ([facility] [address] 1964    Adult onset for 39 years    Essential hypertension     Hiatal hernia     Hyperlipidemia     Hypothyroidism     Systolic hypertension         Past Surgical History:   Past Surgical History:   Procedure Laterality Date    CARDIAC SURG PROCEDURE UNLIST      three cardiac stents     HX CAROTID ENDARTERECTOMY      HX CHOLECYSTECTOMY      HX HYSTERECTOMY      NM RADIONUCLIDE ABLATION THERAPY          Family History:   History reviewed. No pertinent family history.      Social History:   Social History   Substance Use Topics    Smoking status: Former Smoker     Packs/day: 1.00    Smokeless tobacco: Never Used    Alcohol use No        Allergies:   No Known Allergies     Review of Systems   Review of Systems   Cardiovascular: Positive for leg swelling (bilateral). Skin: Positive for color change (neck due to radiation therapy). All other systems reviewed and are negative. Physical Exam  Vitals:    09/03/17 1638   BP: 155/45   Pulse: 62   Resp: 26   Temp: 98.1 °F (36.7 °C)   SpO2: 98%   Weight: 91.8 kg (202 lb 6.4 oz)   Height: 5' 2\" (1.575 m)       Constitutional: No acute distress, elderly, chronically ill appearing  Head:   Eyes: Pupils are equal, round, and reactive to light, EOMI  Neck: Firm erythematous swelling below chin with radiation markings  Cardiovascular: Regular rate and rhythm, no murmurs, rubs, or gallops  Chest: Normal work of breathing and chest excursion bilaterally  Lungs: Lung sounds diminished  Abdomen: Soft, non tender, non distended  Back: No evidence of trauma or deformity  Extremities: No evidence of trauma or deformity, bilateral symmetrical LE edema  Skin: Warm and dry  Neuro: Alert and appropriate  Psychiatric: Normal mood and affect      Physical Exam   Nursing note and vitals reviewed.        Vital signs and nursing notes reviewed      Diagnostic Study Results     Labs -      Recent Results (from the past 12 hour(s))   EKG, 12 LEAD, INITIAL    Collection Time: 09/03/17  4:54 PM   Result Value Ref Range    Ventricular Rate 58 BPM    Atrial Rate 58 BPM    P-R Interval 222 ms    QRS Duration 104 ms    Q-T Interval 430 ms    QTC Calculation (Bezet) 422 ms    Calculated P Axis 45 degrees    Calculated R Axis -28 degrees    Calculated T Axis 83 degrees    Diagnosis       Sinus bradycardia with 1st degree AV block  Minimal voltage criteria for LVH, may be normal variant  Nonspecific T wave abnormality  Abnormal ECG  When compared with ECG of 11-MAR-2017 07:02,  No significant change was found     CBC WITH AUTOMATED DIFF    Collection Time: 09/03/17  5:15 PM   Result Value Ref Range    WBC 11.8 4.6 - 13.2 K/uL    RBC 4.06 (L) 4.20 - 5.30 M/uL    HGB 9.7 (L) 12.0 - 16.0 g/dL    HCT 29.8 (L) 35.0 - 45.0 %    MCV 73.4 (L) 74.0 - 97.0 FL    MCH 23.9 (L) 24.0 - 34.0 PG    MCHC 32.6 31.0 - 37.0 g/dL    RDW 18.3 (H) 11.6 - 14.5 %    PLATELET 361 (H) 558 - 420 K/uL    MPV 8.8 (L) 9.2 - 11.8 FL    NEUTROPHILS 78 (H) 40 - 73 %    LYMPHOCYTES 7 (L) 21 - 52 %    MONOCYTES 12 (H) 3 - 10 %    EOSINOPHILS 2 0 - 5 %    BASOPHILS 1 0 - 2 %    ABS. NEUTROPHILS 9.2 (H) 1.8 - 8.0 K/UL    ABS. LYMPHOCYTES 0.8 (L) 0.9 - 3.6 K/UL    ABS. MONOCYTES 1.4 (H) 0.05 - 1.2 K/UL    ABS. EOSINOPHILS 0.3 0.0 - 0.4 K/UL    ABS. BASOPHILS 0.1 (H) 0.0 - 0.06 K/UL    DF AUTOMATED     METABOLIC PANEL, COMPREHENSIVE    Collection Time: 09/03/17  5:15 PM   Result Value Ref Range    Sodium 125 (L) 136 - 145 mmol/L    Potassium 5.1 3.5 - 5.5 mmol/L    Chloride 94 (L) 100 - 108 mmol/L    CO2 27 21 - 32 mmol/L    Anion gap 4 3.0 - 18 mmol/L    Glucose 180 (H) 74 - 99 mg/dL    BUN 46 (H) 7.0 - 18 MG/DL    Creatinine 1.88 (H) 0.6 - 1.3 MG/DL    BUN/Creatinine ratio 24 (H) 12 - 20      GFR est AA 31 (L) >60 ml/min/1.73m2    GFR est non-AA 25 (L) >60 ml/min/1.73m2    Calcium 9.1 8.5 - 10.1 MG/DL    Bilirubin, total 0.3 0.2 - 1.0 MG/DL    ALT (SGPT) 19 13 - 56 U/L    AST (SGOT) 15 15 - 37 U/L    Alk. phosphatase 82 45 - 117 U/L    Protein, total 6.2 (L) 6.4 - 8.2 g/dL    Albumin 2.9 (L) 3.4 - 5.0 g/dL    Globulin 3.3 2.0 - 4.0 g/dL    A-G Ratio 0.9 0.8 - 1.7     NT-PRO BNP    Collection Time: 09/03/17  5:15 PM   Result Value Ref Range    NT pro-BNP 3529 (H) 0 - 1800 PG/ML   CARDIAC PANEL,(CK, CKMB & TROPONIN)    Collection Time: 09/03/17  5:15 PM   Result Value Ref Range    CK 90 26 - 192 U/L    CK - MB 2.0 <3.6 ng/ml    CK-MB Index 2.2 0.0 - 4.0 %    Troponin-I, Qt. <0.02 0.00 - 0.06 NG/ML       Radiologic Studies -  DUPLEX LOWER EXT VENOUS BILAT   1. Deep vein(s) visualized include the common femoral, deep femoral,  proximal femoral, mid femoral, distal femoral, popliteal(above knee),  popliteal(fossa), popliteal(below knee), posterior tibial and peroneal   veins.   2. No evidence of deep venous thrombosis detected in the veins  visualized. 3. Superficial vein(s) visualized include the great saphenous vein. 4. No evidence of superficial thrombosis detected.     Left leg :  1. Deep vein(s) visualized include the common femoral, deep femoral,  proximal femoral, mid femoral, distal femoral, popliteal(above knee),  popliteal(fossa), popliteal(below knee), posterior tibial and peroneal   veins. 2. No evidence of deep venous thrombosis detected in the veins  visualized. 3. Superficial vein(s) visualized include the great saphenous vein. 4. No evidence of superficial thrombosis detected. As read by the technologist.   XR CHEST SNGL V    (Results Pending)      6:56 PM  RADIOLOGY FINDINGS  Chest X-ray shows no acute abnormality but mild pulmonary interstitial edema  Pending review by Radiologist  Recorded by Chandler Flowers ED Scribe, as dictated by Patric Piedra MD    Medical Decision Making     Vital Signs-Reviewed the patient's vital signs. Patient Vitals for the past 12 hrs:   Temp Pulse Resp BP SpO2   09/03/17 1638 98.1 °F (36.7 °C) 62 26 155/45 98 %       Pulse Oximetry Analysis - Normal 98% on RA. No intervention needed. Cardiac Monitor:   Rate: 59 bpm  Rhythm: Sinus Bradycardia      EKG interpretation: (Preliminary)  4:54 PM  58 bpm, sinus bradycardia with 1st degree AV block, 104 ms, nonspecific T wave abnormality  EKG read by Patric Piedra MD at 4:58 PM     Nursing notes. Old Medical Records: Nursing notes. ED Course:     4:38 PM Initial assessment performed. Medications Given in the ED:  Medications   famotidine (PF) (PEPCID) 20 mg in sodium chloride 0.9 % 10 mL injection (not administered)   furosemide (LASIX) injection 40 mg (not administered)        Discharge Note:  6:51 PM  Patients results have been reviewed with them.   Patient and/or family have verbally conveyed their understanding and agreement of the patient's signs, symptoms, diagnosis, treatment and prognosis and additionally agree to follow up as recommended or return to the Emergency Room should their condition change prior to their follow-up appointment. Patient verbally agrees with the care-plan and verbally conveys that all of their questions have been answered. Discharge instructions have also been provided to the patient with some educational information regarding their diagnosis as well a list of reasons why they would want to return to the ER prior to their follow-up appointment should their condition change. Discussion:  80 y.o. female with multiple chronic medical problems presenting for lower extremity edema after Lasix was stopped. Labs show hyponatremia. Will restart Lasix and d/c with early PCP follow up and return precautions. Pt agrees with this plan    Diagnosis   Clinical Impression:   1. Bilateral edema of lower extremity    2. Hyponatremia    3. Anxiety state    4. CKD (chronic kidney disease), unspecified stage    5. Anemia, unspecified type         Follow-up Information     Follow up With Details Comments [address], [place] Schedule an appointment as soon as possible for a visit in 2 days for PCP follow up [address] [facility] EMERGENCY DEPT  As needed, If symptoms worsen [address] [address]  [phone]          Current Discharge Medication List      START taking these medications    Details   furosemide (LASIX) 40 mg tablet Take 1 Tab by mouth daily for 20 days. Qty: 20 Tab, Refills: 0                 _______________________________   Attestations:     SCRIBE ATTESTATION:  This note is prepared by Mercedes Carlos, acting as Scribe for Serena Paz MD on 4:38 PM on 9/3/2017 . PROVIDER ATTESTATION:  Serena Paz MD: The scribe's documentation has been prepared under my direction and personally reviewed by me in its entirety.  I confirm that the note above accurately reflects all work, treatment, procedures, and medical decision making performed by me.   _______________________________

## 2017-09-03 NOTE — ED NOTES
Pt refused pepcid, states \"i just want my pain medication\" provider updated and states patient is discharged. No new orders received.

## 2017-09-03 NOTE — PROCEDURES
[facility]  *** FINAL REPORT ***    Name: Juan Kan  MRN: [id number]    Outpatient  : 16 Mar 1932  HIS Order #: [id number]  [address] [facility] Visit #: [id number]  Date: 03 Sep 2017    TYPE OF TEST: Peripheral Venous Testing    REASON FOR TEST  Pain in limb    Right Leg:-  Deep venous thrombosis:           No  Superficial venous thrombosis:    No  Deep venous insufficiency:        Not examined  Superficial venous insufficiency: Not examined    Left Leg:-  Deep venous thrombosis:           No  Superficial venous thrombosis:    No  Deep venous insufficiency:        Not examined  Superficial venous insufficiency: Not examined      INTERPRETATION/FINDINGS  Duplex images were obtained using 2-D gray scale, color flow, and  spectral Doppler analysis. Right leg :  1. Deep vein(s) visualized include the common femoral, deep femoral,  proximal femoral, mid femoral, distal femoral, popliteal(above knee),  popliteal(fossa), popliteal(below knee), posterior tibial and peroneal   veins. 2. No evidence of deep venous thrombosis detected in the veins  visualized. 3. Superficial vein(s) visualized include the great saphenous vein. 4. No evidence of superficial thrombosis detected. Left leg :  1. Deep vein(s) visualized include the common femoral, deep femoral,  proximal femoral, mid femoral, distal femoral, popliteal(above knee),  popliteal(fossa), popliteal(below knee), posterior tibial and peroneal   veins. 2. No evidence of deep venous thrombosis detected in the veins  visualized. 3. Superficial vein(s) visualized include the great saphenous vein. 4. No evidence of superficial thrombosis detected. ADDITIONAL COMMENTS    I have personally reviewed the data relevant to the interpretation of  this  study.     TECHNOLOGIST: Hussein Prasad RDCS, RVT  Signed: 2017 06:37 PM    PHYSICIAN: Ishan Vargas MD  Signed: 2017 02:47 PM

## 2017-09-09 LAB
ATRIAL RATE: 58 BPM
CALCULATED P AXIS, ECG09: 45 DEGREES
CALCULATED R AXIS, ECG10: -28 DEGREES
CALCULATED T AXIS, ECG11: 83 DEGREES
DIAGNOSIS, 93000: NORMAL
P-R INTERVAL, ECG05: 222 MS
Q-T INTERVAL, ECG07: 430 MS
QRS DURATION, ECG06: 104 MS
QTC CALCULATION (BEZET), ECG08: 422 MS
VENTRICULAR RATE, ECG03: 58 BPM

## 2017-09-10 ENCOUNTER — HOSPITAL ENCOUNTER (INPATIENT)
Age: 82
LOS: 10 days | Discharge: NURSING, REHAB OR LONG-TERM CARE | DRG: 291 | End: 2017-09-20
Attending: EMERGENCY MEDICINE | Admitting: INTERNAL MEDICINE
Payer: MEDICARE

## 2017-09-10 ENCOUNTER — APPOINTMENT (OUTPATIENT)
Dept: GENERAL RADIOLOGY | Age: 82
DRG: 291 | End: 2017-09-10
Attending: EMERGENCY MEDICINE
Payer: MEDICARE

## 2017-09-10 DIAGNOSIS — I50.30 DIASTOLIC CONGESTIVE HEART FAILURE, UNSPECIFIED CONGESTIVE HEART FAILURE CHRONICITY: ICD-10-CM

## 2017-09-10 DIAGNOSIS — N30.00 ACUTE CYSTITIS WITHOUT HEMATURIA: ICD-10-CM

## 2017-09-10 DIAGNOSIS — N17.9 ACUTE RENAL FAILURE SUPERIMPOSED ON STAGE 4 CHRONIC KIDNEY DISEASE (HCC): ICD-10-CM

## 2017-09-10 DIAGNOSIS — N18.4 ACUTE RENAL FAILURE SUPERIMPOSED ON STAGE 4 CHRONIC KIDNEY DISEASE (HCC): ICD-10-CM

## 2017-09-10 DIAGNOSIS — R60.1 ANASARCA: ICD-10-CM

## 2017-09-10 DIAGNOSIS — F05 ACUTE HYPERACTIVE DELIRIUM DUE TO MULTIPLE ETIOLOGIES: ICD-10-CM

## 2017-09-10 DIAGNOSIS — N18.9 CRF (CHRONIC RENAL FAILURE), UNSPECIFIED STAGE: ICD-10-CM

## 2017-09-10 DIAGNOSIS — C14.8: ICD-10-CM

## 2017-09-10 DIAGNOSIS — I50.21 ACUTE SYSTOLIC CONGESTIVE HEART FAILURE (HCC): Primary | ICD-10-CM

## 2017-09-10 DIAGNOSIS — E87.5 ACUTE HYPERKALEMIA: ICD-10-CM

## 2017-09-10 LAB
ALBUMIN SERPL-MCNC: 3 G/DL (ref 3.4–5)
ALBUMIN/GLOB SERPL: 0.8 {RATIO} (ref 0.8–1.7)
ALP SERPL-CCNC: 90 U/L (ref 45–117)
ALT SERPL-CCNC: 14 U/L (ref 13–56)
ANION GAP SERPL CALC-SCNC: 10 MMOL/L (ref 3–18)
APPEARANCE UR: ABNORMAL
AST SERPL-CCNC: 12 U/L (ref 15–37)
BACTERIA URNS QL MICRO: ABNORMAL /HPF
BASOPHILS # BLD: 0 K/UL (ref 0–0.06)
BASOPHILS NFR BLD: 0 % (ref 0–2)
BILIRUB SERPL-MCNC: 0.4 MG/DL (ref 0.2–1)
BILIRUB UR QL: NEGATIVE
BNP SERPL-MCNC: 3228 PG/ML (ref 0–1800)
BUN SERPL-MCNC: 42 MG/DL (ref 7–18)
BUN/CREAT SERPL: 20 (ref 12–20)
CALCIUM SERPL-MCNC: 9.3 MG/DL (ref 8.5–10.1)
CHLORIDE SERPL-SCNC: 92 MMOL/L (ref 100–108)
CK MB CFR SERPL CALC: 2.4 % (ref 0–4)
CK MB SERPL-MCNC: 1.7 NG/ML (ref 5–25)
CK SERPL-CCNC: 72 U/L (ref 26–192)
CO2 SERPL-SCNC: 24 MMOL/L (ref 21–32)
COLOR UR: YELLOW
CREAT SERPL-MCNC: 2.08 MG/DL (ref 0.6–1.3)
DIFFERENTIAL METHOD BLD: ABNORMAL
EOSINOPHIL # BLD: 0.2 K/UL (ref 0–0.4)
EOSINOPHIL NFR BLD: 1 % (ref 0–5)
ERYTHROCYTE [DISTWIDTH] IN BLOOD BY AUTOMATED COUNT: 18.2 % (ref 11.6–14.5)
GLOBULIN SER CALC-MCNC: 3.8 G/DL (ref 2–4)
GLUCOSE SERPL-MCNC: 154 MG/DL (ref 74–99)
GLUCOSE UR STRIP.AUTO-MCNC: NEGATIVE MG/DL
HCT VFR BLD AUTO: 29.7 % (ref 35–45)
HGB BLD-MCNC: 9.8 G/DL (ref 12–16)
HGB UR QL STRIP: NEGATIVE
KETONES UR QL STRIP.AUTO: NEGATIVE MG/DL
LEUKOCYTE ESTERASE UR QL STRIP.AUTO: ABNORMAL
LYMPHOCYTES # BLD: 0.7 K/UL (ref 0.9–3.6)
LYMPHOCYTES NFR BLD: 5 % (ref 21–52)
MCH RBC QN AUTO: 23.9 PG (ref 24–34)
MCHC RBC AUTO-ENTMCNC: 33 G/DL (ref 31–37)
MCV RBC AUTO: 72.4 FL (ref 74–97)
MONOCYTES # BLD: 1.8 K/UL (ref 0.05–1.2)
MONOCYTES NFR BLD: 13 % (ref 3–10)
NEUTS SEG # BLD: 10.8 K/UL (ref 1.8–8)
NEUTS SEG NFR BLD: 81 % (ref 40–73)
NITRITE UR QL STRIP.AUTO: POSITIVE
PH UR STRIP: 7.5 [PH] (ref 5–8)
PLATELET # BLD AUTO: 477 K/UL (ref 135–420)
PMV BLD AUTO: 8.7 FL (ref 9.2–11.8)
POTASSIUM SERPL-SCNC: 6.3 MMOL/L (ref 3.5–5.5)
POTASSIUM SERPL-SCNC: 6.5 MMOL/L (ref 3.5–5.5)
PROT SERPL-MCNC: 6.8 G/DL (ref 6.4–8.2)
PROT UR STRIP-MCNC: 30 MG/DL
RBC # BLD AUTO: 4.1 M/UL (ref 4.2–5.3)
RBC #/AREA URNS HPF: ABNORMAL /HPF (ref 0–5)
SODIUM SERPL-SCNC: 126 MMOL/L (ref 136–145)
SP GR UR REFRACTOMETRY: 1.01 (ref 1–1.03)
TROPONIN I SERPL-MCNC: <0.02 NG/ML (ref 0–0.06)
UROBILINOGEN UR QL STRIP.AUTO: 0.2 EU/DL (ref 0.2–1)
WBC # BLD AUTO: 13.4 K/UL (ref 4.6–13.2)
WBC URNS QL MICRO: >100 /HPF (ref 0–5)

## 2017-09-10 PROCEDURE — 82550 ASSAY OF CK (CPK): CPT | Performed by: EMERGENCY MEDICINE

## 2017-09-10 PROCEDURE — 81001 URINALYSIS AUTO W/SCOPE: CPT | Performed by: EMERGENCY MEDICINE

## 2017-09-10 PROCEDURE — 84132 ASSAY OF SERUM POTASSIUM: CPT | Performed by: EMERGENCY MEDICINE

## 2017-09-10 PROCEDURE — 93005 ELECTROCARDIOGRAM TRACING: CPT

## 2017-09-10 PROCEDURE — 77030011943

## 2017-09-10 PROCEDURE — 85025 COMPLETE CBC W/AUTO DIFF WBC: CPT | Performed by: EMERGENCY MEDICINE

## 2017-09-10 PROCEDURE — 65660000000 HC RM CCU STEPDOWN

## 2017-09-10 PROCEDURE — 77030034850

## 2017-09-10 PROCEDURE — 71010 XR CHEST PORT: CPT

## 2017-09-10 PROCEDURE — 83880 ASSAY OF NATRIURETIC PEPTIDE: CPT | Performed by: EMERGENCY MEDICINE

## 2017-09-10 PROCEDURE — 99285 EMERGENCY DEPT VISIT HI MDM: CPT

## 2017-09-10 RX ORDER — FUROSEMIDE 10 MG/ML
60 INJECTION INTRAMUSCULAR; INTRAVENOUS
Status: COMPLETED | OUTPATIENT
Start: 2017-09-10 | End: 2017-09-11

## 2017-09-11 ENCOUNTER — APPOINTMENT (OUTPATIENT)
Dept: NUCLEAR MEDICINE | Age: 82
DRG: 291 | End: 2017-09-11
Attending: INTERNAL MEDICINE
Payer: MEDICARE

## 2017-09-11 PROBLEM — F41.9 ANXIETY: Status: ACTIVE | Noted: 2017-09-11

## 2017-09-11 PROBLEM — N18.4 ACUTE RENAL FAILURE SUPERIMPOSED ON STAGE 4 CHRONIC KIDNEY DISEASE (HCC): Status: ACTIVE | Noted: 2017-09-11

## 2017-09-11 PROBLEM — N17.9 ACUTE RENAL FAILURE SUPERIMPOSED ON STAGE 4 CHRONIC KIDNEY DISEASE (HCC): Status: ACTIVE | Noted: 2017-09-11

## 2017-09-11 PROBLEM — R06.1 STRIDOR: Status: ACTIVE | Noted: 2017-09-11

## 2017-09-11 PROBLEM — C14.8: Status: ACTIVE | Noted: 2017-09-11

## 2017-09-11 PROBLEM — I50.9 HEART FAILURE (HCC): Status: ACTIVE | Noted: 2017-09-11

## 2017-09-11 LAB
ALBUMIN SERPL-MCNC: 3.2 G/DL (ref 3.4–5)
ALBUMIN/GLOB SERPL: 0.8 {RATIO} (ref 0.8–1.7)
ALP SERPL-CCNC: 91 U/L (ref 45–117)
ALT SERPL-CCNC: 21 U/L (ref 13–56)
ANION GAP SERPL CALC-SCNC: 13 MMOL/L (ref 3–18)
AST SERPL-CCNC: 30 U/L (ref 15–37)
BILIRUB SERPL-MCNC: 0.5 MG/DL (ref 0.2–1)
BUN SERPL-MCNC: 43 MG/DL (ref 7–18)
BUN/CREAT SERPL: 21 (ref 12–20)
CALCIUM SERPL-MCNC: 9.6 MG/DL (ref 8.5–10.1)
CHLORIDE SERPL-SCNC: 90 MMOL/L (ref 100–108)
CK MB CFR SERPL CALC: 1.8 % (ref 0–4)
CK MB CFR SERPL CALC: 2.1 % (ref 0–4)
CK MB CFR SERPL CALC: 2.3 % (ref 0–4)
CK MB SERPL-MCNC: 1.1 NG/ML (ref 5–25)
CK MB SERPL-MCNC: 1.5 NG/ML (ref 5–25)
CK MB SERPL-MCNC: 1.6 NG/ML (ref 5–25)
CK SERPL-CCNC: 62 U/L (ref 26–192)
CK SERPL-CCNC: 64 U/L (ref 26–192)
CK SERPL-CCNC: 75 U/L (ref 26–192)
CO2 SERPL-SCNC: 19 MMOL/L (ref 21–32)
CREAT SERPL-MCNC: 2.03 MG/DL (ref 0.6–1.3)
ERYTHROCYTE [DISTWIDTH] IN BLOOD BY AUTOMATED COUNT: 18.2 % (ref 11.6–14.5)
EST. AVERAGE GLUCOSE BLD GHB EST-MCNC: 206 MG/DL
GLOBULIN SER CALC-MCNC: 4.2 G/DL (ref 2–4)
GLUCOSE BLD STRIP.AUTO-MCNC: 249 MG/DL (ref 70–110)
GLUCOSE BLD STRIP.AUTO-MCNC: 269 MG/DL (ref 70–110)
GLUCOSE BLD STRIP.AUTO-MCNC: 350 MG/DL (ref 70–110)
GLUCOSE BLD STRIP.AUTO-MCNC: 427 MG/DL (ref 70–110)
GLUCOSE SERPL-MCNC: 211 MG/DL (ref 74–99)
HBA1C MFR BLD: 8.8 % (ref 4.5–5.6)
HCT VFR BLD AUTO: 28.6 % (ref 35–45)
HGB BLD-MCNC: 9.6 G/DL (ref 12–16)
MCH RBC QN AUTO: 24.1 PG (ref 24–34)
MCHC RBC AUTO-ENTMCNC: 33.6 G/DL (ref 31–37)
MCV RBC AUTO: 71.9 FL (ref 74–97)
PLATELET # BLD AUTO: 475 K/UL (ref 135–420)
PMV BLD AUTO: 8.8 FL (ref 9.2–11.8)
POTASSIUM SERPL-SCNC: 4.2 MMOL/L (ref 3.5–5.5)
POTASSIUM SERPL-SCNC: 5 MMOL/L (ref 3.5–5.5)
POTASSIUM SERPL-SCNC: 6.3 MMOL/L (ref 3.5–5.5)
PROT SERPL-MCNC: 7.4 G/DL (ref 6.4–8.2)
RBC # BLD AUTO: 3.98 M/UL (ref 4.2–5.3)
SODIUM SERPL-SCNC: 122 MMOL/L (ref 136–145)
TROPONIN I SERPL-MCNC: <0.02 NG/ML (ref 0–0.06)
WBC # BLD AUTO: 15.1 K/UL (ref 4.6–13.2)

## 2017-09-11 PROCEDURE — 82962 GLUCOSE BLOOD TEST: CPT

## 2017-09-11 PROCEDURE — 87186 SC STD MICRODIL/AGAR DIL: CPT | Performed by: INTERNAL MEDICINE

## 2017-09-11 PROCEDURE — 84132 ASSAY OF SERUM POTASSIUM: CPT | Performed by: INTERNAL MEDICINE

## 2017-09-11 PROCEDURE — 74011636637 HC RX REV CODE- 636/637: Performed by: INTERNAL MEDICINE

## 2017-09-11 PROCEDURE — 74011250636 HC RX REV CODE- 250/636: Performed by: EMERGENCY MEDICINE

## 2017-09-11 PROCEDURE — 36415 COLL VENOUS BLD VENIPUNCTURE: CPT | Performed by: INTERNAL MEDICINE

## 2017-09-11 PROCEDURE — 74011250637 HC RX REV CODE- 250/637: Performed by: INTERNAL MEDICINE

## 2017-09-11 PROCEDURE — 94760 N-INVAS EAR/PLS OXIMETRY 1: CPT

## 2017-09-11 PROCEDURE — 85027 COMPLETE CBC AUTOMATED: CPT | Performed by: INTERNAL MEDICINE

## 2017-09-11 PROCEDURE — 74011000258 HC RX REV CODE- 258: Performed by: INTERNAL MEDICINE

## 2017-09-11 PROCEDURE — 94640 AIRWAY INHALATION TREATMENT: CPT

## 2017-09-11 PROCEDURE — 65660000000 HC RM CCU STEPDOWN

## 2017-09-11 PROCEDURE — 87086 URINE CULTURE/COLONY COUNT: CPT | Performed by: INTERNAL MEDICINE

## 2017-09-11 PROCEDURE — 74011250636 HC RX REV CODE- 250/636: Performed by: INTERNAL MEDICINE

## 2017-09-11 PROCEDURE — 82550 ASSAY OF CK (CPK): CPT | Performed by: INTERNAL MEDICINE

## 2017-09-11 PROCEDURE — 93306 TTE W/DOPPLER COMPLETE: CPT

## 2017-09-11 PROCEDURE — 83036 HEMOGLOBIN GLYCOSYLATED A1C: CPT | Performed by: INTERNAL MEDICINE

## 2017-09-11 PROCEDURE — A9567 TECHNETIUM TC-99M AEROSOL: HCPCS

## 2017-09-11 PROCEDURE — 74011636637 HC RX REV CODE- 636/637: Performed by: HOSPITALIST

## 2017-09-11 PROCEDURE — 74011000258 HC RX REV CODE- 258: Performed by: EMERGENCY MEDICINE

## 2017-09-11 PROCEDURE — 74011000250 HC RX REV CODE- 250

## 2017-09-11 PROCEDURE — 74011000250 HC RX REV CODE- 250: Performed by: INTERNAL MEDICINE

## 2017-09-11 PROCEDURE — 87077 CULTURE AEROBIC IDENTIFY: CPT | Performed by: INTERNAL MEDICINE

## 2017-09-11 RX ORDER — INSULIN GLARGINE 100 [IU]/ML
20 INJECTION, SOLUTION SUBCUTANEOUS
Status: COMPLETED | OUTPATIENT
Start: 2017-09-11 | End: 2017-09-11

## 2017-09-11 RX ORDER — INSULIN GLARGINE 100 [IU]/ML
20 INJECTION, SOLUTION SUBCUTANEOUS DAILY
Status: DISCONTINUED | OUTPATIENT
Start: 2017-09-11 | End: 2017-09-12

## 2017-09-11 RX ORDER — CLOTRIMAZOLE AND BETAMETHASONE DIPROPIONATE 10; .64 MG/G; MG/G
CREAM TOPICAL 2 TIMES DAILY
Status: DISCONTINUED | OUTPATIENT
Start: 2017-09-11 | End: 2017-09-20 | Stop reason: HOSPADM

## 2017-09-11 RX ORDER — AMLODIPINE BESYLATE 5 MG/1
10 TABLET ORAL DAILY
Status: DISCONTINUED | OUTPATIENT
Start: 2017-09-11 | End: 2017-09-19

## 2017-09-11 RX ORDER — INSULIN LISPRO 100 [IU]/ML
10 INJECTION, SOLUTION INTRAVENOUS; SUBCUTANEOUS
Status: DISCONTINUED | OUTPATIENT
Start: 2017-09-11 | End: 2017-09-15

## 2017-09-11 RX ORDER — LANOLIN ALCOHOL/MO/W.PET/CERES
50 CREAM (GRAM) TOPICAL DAILY
Status: DISCONTINUED | OUTPATIENT
Start: 2017-09-11 | End: 2017-09-14

## 2017-09-11 RX ORDER — ASPIRIN 81 MG/1
81 TABLET ORAL DAILY
Status: DISCONTINUED | OUTPATIENT
Start: 2017-09-11 | End: 2017-09-20 | Stop reason: HOSPADM

## 2017-09-11 RX ORDER — LABETALOL 100 MG/1
100 TABLET, FILM COATED ORAL 2 TIMES DAILY
Status: DISCONTINUED | OUTPATIENT
Start: 2017-09-11 | End: 2017-09-11

## 2017-09-11 RX ORDER — CHOLECALCIFEROL (VITAMIN D3) 125 MCG
5 CAPSULE ORAL
Status: DISCONTINUED | OUTPATIENT
Start: 2017-09-11 | End: 2017-09-19

## 2017-09-11 RX ORDER — FUROSEMIDE 10 MG/ML
60 INJECTION INTRAMUSCULAR; INTRAVENOUS EVERY 12 HOURS
Status: DISCONTINUED | OUTPATIENT
Start: 2017-09-11 | End: 2017-09-14

## 2017-09-11 RX ORDER — CALCIUM CARBONATE 200(500)MG
400 TABLET,CHEWABLE ORAL 3 TIMES DAILY
Status: DISCONTINUED | OUTPATIENT
Start: 2017-09-11 | End: 2017-09-19

## 2017-09-11 RX ORDER — METOPROLOL TARTRATE 25 MG/1
25 TABLET, FILM COATED ORAL 2 TIMES DAILY
Status: DISCONTINUED | OUTPATIENT
Start: 2017-09-11 | End: 2017-09-15

## 2017-09-11 RX ORDER — CLOPIDOGREL BISULFATE 75 MG/1
75 TABLET ORAL DAILY
Status: DISCONTINUED | OUTPATIENT
Start: 2017-09-11 | End: 2017-09-20 | Stop reason: HOSPADM

## 2017-09-11 RX ORDER — PANTOPRAZOLE SODIUM 40 MG/1
40 TABLET, DELAYED RELEASE ORAL DAILY
Status: DISCONTINUED | OUTPATIENT
Start: 2017-09-11 | End: 2017-09-19

## 2017-09-11 RX ORDER — PROMETHAZINE HYDROCHLORIDE 6.25 MG/5ML
6.25 SYRUP ORAL
Status: DISCONTINUED | OUTPATIENT
Start: 2017-09-11 | End: 2017-09-20

## 2017-09-11 RX ORDER — DIPHENHYDRAMINE HCL 25 MG
25 CAPSULE ORAL
Status: DISCONTINUED | OUTPATIENT
Start: 2017-09-11 | End: 2017-09-19

## 2017-09-11 RX ORDER — ISOSORBIDE MONONITRATE 30 MG/1
30 TABLET, EXTENDED RELEASE ORAL 2 TIMES DAILY
Status: DISCONTINUED | OUTPATIENT
Start: 2017-09-11 | End: 2017-09-20 | Stop reason: HOSPADM

## 2017-09-11 RX ORDER — HEPARIN SODIUM 5000 [USP'U]/ML
5000 INJECTION, SOLUTION INTRAVENOUS; SUBCUTANEOUS EVERY 8 HOURS
Status: DISCONTINUED | OUTPATIENT
Start: 2017-09-11 | End: 2017-09-19

## 2017-09-11 RX ORDER — MELATONIN
1000 DAILY
Status: DISCONTINUED | OUTPATIENT
Start: 2017-09-11 | End: 2017-09-19

## 2017-09-11 RX ORDER — LEVOFLOXACIN 5 MG/ML
500 INJECTION, SOLUTION INTRAVENOUS
Status: DISCONTINUED | OUTPATIENT
Start: 2017-09-11 | End: 2017-09-14

## 2017-09-11 RX ORDER — MORPHINE SULFATE 10 MG/ML
1 INJECTION, SOLUTION INTRAMUSCULAR; INTRAVENOUS
Status: DISCONTINUED | OUTPATIENT
Start: 2017-09-11 | End: 2017-09-11

## 2017-09-11 RX ORDER — NALOXONE HYDROCHLORIDE 0.4 MG/ML
0.4 INJECTION, SOLUTION INTRAMUSCULAR; INTRAVENOUS; SUBCUTANEOUS AS NEEDED
Status: DISCONTINUED | OUTPATIENT
Start: 2017-09-11 | End: 2017-09-19

## 2017-09-11 RX ORDER — MORPHINE SULFATE 2 MG/ML
1 INJECTION, SOLUTION INTRAMUSCULAR; INTRAVENOUS
Status: DISCONTINUED | OUTPATIENT
Start: 2017-09-11 | End: 2017-09-19

## 2017-09-11 RX ORDER — MORPHINE SULFATE 2 MG/ML
1 INJECTION, SOLUTION INTRAMUSCULAR; INTRAVENOUS
Status: COMPLETED | OUTPATIENT
Start: 2017-09-11 | End: 2017-09-11

## 2017-09-11 RX ORDER — LANOLIN ALCOHOL/MO/W.PET/CERES
3 CREAM (GRAM) TOPICAL
Status: DISCONTINUED | OUTPATIENT
Start: 2017-09-11 | End: 2017-09-11

## 2017-09-11 RX ORDER — ATORVASTATIN CALCIUM 20 MG/1
20 TABLET, FILM COATED ORAL DAILY
Status: DISCONTINUED | OUTPATIENT
Start: 2017-09-11 | End: 2017-09-11

## 2017-09-11 RX ORDER — TRAMADOL HYDROCHLORIDE 50 MG/1
50 TABLET ORAL
Status: DISCONTINUED | OUTPATIENT
Start: 2017-09-11 | End: 2017-09-19

## 2017-09-11 RX ORDER — SODIUM POLYSTYRENE SULFONATE 15 G/60ML
30 SUSPENSION ORAL; RECTAL
Status: COMPLETED | OUTPATIENT
Start: 2017-09-11 | End: 2017-09-11

## 2017-09-11 RX ORDER — LOPERAMIDE HYDROCHLORIDE 2 MG/1
2 CAPSULE ORAL DAILY
Status: DISCONTINUED | OUTPATIENT
Start: 2017-09-11 | End: 2017-09-11

## 2017-09-11 RX ORDER — LEVOTHYROXINE SODIUM 150 UG/1
150 TABLET ORAL
Status: DISCONTINUED | OUTPATIENT
Start: 2017-09-11 | End: 2017-09-20 | Stop reason: HOSPADM

## 2017-09-11 RX ORDER — LORAZEPAM 0.5 MG/1
0.5 TABLET ORAL
Status: DISCONTINUED | OUTPATIENT
Start: 2017-09-11 | End: 2017-09-15

## 2017-09-11 RX ORDER — MAGNESIUM SULFATE 100 %
4 CRYSTALS MISCELLANEOUS AS NEEDED
Status: DISCONTINUED | OUTPATIENT
Start: 2017-09-11 | End: 2017-09-19

## 2017-09-11 RX ORDER — IPRATROPIUM BROMIDE AND ALBUTEROL SULFATE 2.5; .5 MG/3ML; MG/3ML
3 SOLUTION RESPIRATORY (INHALATION)
Status: DISCONTINUED | OUTPATIENT
Start: 2017-09-11 | End: 2017-09-14

## 2017-09-11 RX ORDER — MECLIZINE HCL 12.5 MG 12.5 MG/1
12.5 TABLET ORAL
Status: DISCONTINUED | OUTPATIENT
Start: 2017-09-11 | End: 2017-09-19

## 2017-09-11 RX ORDER — INSULIN LISPRO 100 [IU]/ML
INJECTION, SOLUTION INTRAVENOUS; SUBCUTANEOUS
Status: DISCONTINUED | OUTPATIENT
Start: 2017-09-11 | End: 2017-09-15

## 2017-09-11 RX ORDER — ACETAMINOPHEN 325 MG/1
650 TABLET ORAL
Status: DISCONTINUED | OUTPATIENT
Start: 2017-09-11 | End: 2017-09-19

## 2017-09-11 RX ORDER — IPRATROPIUM BROMIDE AND ALBUTEROL SULFATE 2.5; .5 MG/3ML; MG/3ML
SOLUTION RESPIRATORY (INHALATION)
Status: COMPLETED
Start: 2017-09-11 | End: 2017-09-11

## 2017-09-11 RX ORDER — DEXTROSE 50 % IN WATER (D50W) INTRAVENOUS SYRINGE
25-50 AS NEEDED
Status: DISCONTINUED | OUTPATIENT
Start: 2017-09-11 | End: 2017-09-19

## 2017-09-11 RX ORDER — SODIUM BICARBONATE 650 MG/1
325 TABLET ORAL
Status: COMPLETED | OUTPATIENT
Start: 2017-09-11 | End: 2017-09-11

## 2017-09-11 RX ORDER — NITROGLYCERIN 0.4 MG/1
0.4 TABLET SUBLINGUAL AS NEEDED
Status: DISCONTINUED | OUTPATIENT
Start: 2017-09-11 | End: 2017-09-20 | Stop reason: HOSPADM

## 2017-09-11 RX ORDER — POLYETHYLENE GLYCOL 3350 17 G/17G
17 POWDER, FOR SOLUTION ORAL AS NEEDED
Status: DISCONTINUED | OUTPATIENT
Start: 2017-09-11 | End: 2017-09-20 | Stop reason: HOSPADM

## 2017-09-11 RX ORDER — LANOLIN ALCOHOL/MO/W.PET/CERES
400 CREAM (GRAM) TOPICAL DAILY
Status: DISCONTINUED | OUTPATIENT
Start: 2017-09-11 | End: 2017-09-19

## 2017-09-11 RX ORDER — IPRATROPIUM BROMIDE AND ALBUTEROL SULFATE 2.5; .5 MG/3ML; MG/3ML
3 SOLUTION RESPIRATORY (INHALATION)
Status: COMPLETED | OUTPATIENT
Start: 2017-09-11 | End: 2017-09-11

## 2017-09-11 RX ORDER — MORPHINE SULFATE 2 MG/ML
1 INJECTION, SOLUTION INTRAMUSCULAR; INTRAVENOUS ONCE
Status: COMPLETED | OUTPATIENT
Start: 2017-09-11 | End: 2017-09-11

## 2017-09-11 RX ORDER — CLONIDINE HYDROCHLORIDE 0.1 MG/1
0.2 TABLET ORAL 2 TIMES DAILY
Status: DISCONTINUED | OUTPATIENT
Start: 2017-09-11 | End: 2017-09-20 | Stop reason: HOSPADM

## 2017-09-11 RX ORDER — INSULIN LISPRO 100 [IU]/ML
5 INJECTION, SOLUTION INTRAVENOUS; SUBCUTANEOUS
Status: DISCONTINUED | OUTPATIENT
Start: 2017-09-11 | End: 2017-09-11

## 2017-09-11 RX ADMIN — INSULIN GLARGINE 20 UNITS: 100 INJECTION, SOLUTION SUBCUTANEOUS at 21:57

## 2017-09-11 RX ADMIN — METOPROLOL TARTRATE 25 MG: 25 TABLET ORAL at 09:48

## 2017-09-11 RX ADMIN — IPRATROPIUM BROMIDE AND ALBUTEROL SULFATE 3 ML: .5; 3 SOLUTION RESPIRATORY (INHALATION) at 07:12

## 2017-09-11 RX ADMIN — ANTACID TABLETS 400 MG: 500 TABLET, CHEWABLE ORAL at 21:58

## 2017-09-11 RX ADMIN — METOPROLOL TARTRATE 25 MG: 25 TABLET ORAL at 21:58

## 2017-09-11 RX ADMIN — LORAZEPAM 0.5 MG: 0.5 TABLET ORAL at 10:57

## 2017-09-11 RX ADMIN — PYRIDOXINE HCL TAB 50 MG 50 MG: 50 TAB at 12:18

## 2017-09-11 RX ADMIN — SODIUM BICARBONATE 650 MG TABLET 325 MG: at 07:35

## 2017-09-11 RX ADMIN — FUROSEMIDE 60 MG: 10 INJECTION, SOLUTION INTRAMUSCULAR; INTRAVENOUS at 09:38

## 2017-09-11 RX ADMIN — ISOSORBIDE MONONITRATE 30 MG: 30 TABLET, EXTENDED RELEASE ORAL at 21:58

## 2017-09-11 RX ADMIN — IPRATROPIUM BROMIDE AND ALBUTEROL SULFATE 3 ML: .5; 3 SOLUTION RESPIRATORY (INHALATION) at 00:53

## 2017-09-11 RX ADMIN — CLOPIDOGREL BISULFATE 75 MG: 75 TABLET ORAL at 12:18

## 2017-09-11 RX ADMIN — SODIUM POLYSTYRENE SULFONATE 30 G: 15 SUSPENSION ORAL; RECTAL at 07:34

## 2017-09-11 RX ADMIN — VITAMIN D, TAB 1000IU (100/BT) 1000 UNITS: 25 TAB at 12:19

## 2017-09-11 RX ADMIN — CEFTRIAXONE 1 G: 1 INJECTION, POWDER, FOR SOLUTION INTRAMUSCULAR; INTRAVENOUS at 00:39

## 2017-09-11 RX ADMIN — PANTOPRAZOLE SODIUM 40 MG: 40 TABLET, DELAYED RELEASE ORAL at 12:18

## 2017-09-11 RX ADMIN — INSULIN LISPRO 15 UNITS: 100 INJECTION, SOLUTION INTRAVENOUS; SUBCUTANEOUS at 17:02

## 2017-09-11 RX ADMIN — AMLODIPINE BESYLATE 10 MG: 5 TABLET ORAL at 09:45

## 2017-09-11 RX ADMIN — LORAZEPAM 0.5 MG: 0.5 TABLET ORAL at 22:41

## 2017-09-11 RX ADMIN — METHYLPREDNISOLONE SODIUM SUCCINATE 40 MG: 40 INJECTION, POWDER, FOR SOLUTION INTRAMUSCULAR; INTRAVENOUS at 21:58

## 2017-09-11 RX ADMIN — LABETALOL HYDROCHLORIDE 100 MG: 100 TABLET, FILM COATED ORAL at 09:48

## 2017-09-11 RX ADMIN — INSULIN LISPRO 6 UNITS: 100 INJECTION, SOLUTION INTRAVENOUS; SUBCUTANEOUS at 21:59

## 2017-09-11 RX ADMIN — Medication 400 MG: at 12:18

## 2017-09-11 RX ADMIN — ASPIRIN 81 MG: 81 TABLET, COATED ORAL at 12:18

## 2017-09-11 RX ADMIN — METHYLPREDNISOLONE SODIUM SUCCINATE 40 MG: 40 INJECTION, POWDER, FOR SOLUTION INTRAMUSCULAR; INTRAVENOUS at 07:35

## 2017-09-11 RX ADMIN — FUROSEMIDE 60 MG: 10 INJECTION, SOLUTION INTRAMUSCULAR; INTRAVENOUS at 21:58

## 2017-09-11 RX ADMIN — INSULIN LISPRO 10 UNITS: 100 INJECTION, SOLUTION INTRAVENOUS; SUBCUTANEOUS at 17:02

## 2017-09-11 RX ADMIN — METHYLPREDNISOLONE SODIUM SUCCINATE 40 MG: 40 INJECTION, POWDER, FOR SOLUTION INTRAMUSCULAR; INTRAVENOUS at 13:49

## 2017-09-11 RX ADMIN — LEVOTHYROXINE SODIUM 150 MCG: 150 TABLET ORAL at 07:35

## 2017-09-11 RX ADMIN — CLOTRIMAZOLE AND BETAMETHASONE DIPROPIONATE: 10; .5 CREAM TOPICAL at 09:00

## 2017-09-11 RX ADMIN — INSULIN LISPRO 5 UNITS: 100 INJECTION, SOLUTION INTRAVENOUS; SUBCUTANEOUS at 13:48

## 2017-09-11 RX ADMIN — MORPHINE SULFATE 1 MG: 2 INJECTION, SOLUTION INTRAMUSCULAR; INTRAVENOUS at 00:40

## 2017-09-11 RX ADMIN — Medication 5 MG: at 21:56

## 2017-09-11 RX ADMIN — ANTACID TABLETS 400 MG: 500 TABLET, CHEWABLE ORAL at 09:50

## 2017-09-11 RX ADMIN — INSULIN GLARGINE 20 UNITS: 100 INJECTION, SOLUTION SUBCUTANEOUS at 10:00

## 2017-09-11 RX ADMIN — INSULIN LISPRO 15 UNITS: 100 INJECTION, SOLUTION INTRAVENOUS; SUBCUTANEOUS at 13:47

## 2017-09-11 RX ADMIN — IPRATROPIUM BROMIDE AND ALBUTEROL SULFATE 3 ML: .5; 3 SOLUTION RESPIRATORY (INHALATION) at 16:24

## 2017-09-11 RX ADMIN — IPRATROPIUM BROMIDE AND ALBUTEROL SULFATE 3 ML: .5; 3 SOLUTION RESPIRATORY (INHALATION) at 04:44

## 2017-09-11 RX ADMIN — MORPHINE SULFATE 1 MG: 2 INJECTION, SOLUTION INTRAMUSCULAR; INTRAVENOUS at 02:28

## 2017-09-11 RX ADMIN — CLONIDINE HYDROCHLORIDE 0.2 MG: 0.1 TABLET ORAL at 21:57

## 2017-09-11 RX ADMIN — CHLOROTHIAZIDE SODIUM 500 MG: 500 INJECTION, POWDER, LYOPHILIZED, FOR SOLUTION INTRAVENOUS at 12:17

## 2017-09-11 RX ADMIN — FUROSEMIDE 60 MG: 10 INJECTION, SOLUTION INTRAMUSCULAR; INTRAVENOUS at 00:40

## 2017-09-11 RX ADMIN — IPRATROPIUM BROMIDE AND ALBUTEROL SULFATE 3 ML: 2.5; .5 SOLUTION RESPIRATORY (INHALATION) at 00:53

## 2017-09-11 RX ADMIN — LEVOFLOXACIN 500 MG: 5 INJECTION, SOLUTION INTRAVENOUS at 12:19

## 2017-09-11 RX ADMIN — ISOSORBIDE MONONITRATE 30 MG: 30 TABLET, EXTENDED RELEASE ORAL at 09:47

## 2017-09-11 RX ADMIN — HEPARIN SODIUM 5000 UNITS: 5000 INJECTION, SOLUTION INTRAVENOUS; SUBCUTANEOUS at 12:18

## 2017-09-11 RX ADMIN — IPRATROPIUM BROMIDE AND ALBUTEROL SULFATE 3 ML: .5; 3 SOLUTION RESPIRATORY (INHALATION) at 20:00

## 2017-09-11 RX ADMIN — CLONIDINE HYDROCHLORIDE 0.2 MG: 0.1 TABLET ORAL at 09:46

## 2017-09-11 RX ADMIN — METHYLPREDNISOLONE SODIUM SUCCINATE 80 MG: 40 INJECTION, POWDER, FOR SOLUTION INTRAMUSCULAR; INTRAVENOUS at 01:14

## 2017-09-11 RX ADMIN — INSULIN LISPRO 6 UNITS: 100 INJECTION, SOLUTION INTRAVENOUS; SUBCUTANEOUS at 07:35

## 2017-09-11 RX ADMIN — MORPHINE SULFATE 1 MG: 2 INJECTION, SOLUTION INTRAMUSCULAR; INTRAVENOUS at 15:25

## 2017-09-11 RX ADMIN — HEPARIN SODIUM 5000 UNITS: 5000 INJECTION, SOLUTION INTRAVENOUS; SUBCUTANEOUS at 21:58

## 2017-09-11 RX ADMIN — IPRATROPIUM BROMIDE AND ALBUTEROL SULFATE 3 ML: .5; 3 SOLUTION RESPIRATORY (INHALATION) at 12:07

## 2017-09-11 NOTE — ROUTINE PROCESS
TRANSFER - IN REPORT:    Verbal report received from MARGE Cruz RN(name) on [facility]  being received from ED(unit) for routine progression of care      Report consisted of patients Situation, Background, Assessment and   Recommendations(SBAR). Information from the following report(s) SBAR, Kardex, ED Summary, Intake/Output, MAR and Recent Results was reviewed with the receiving nurse. Opportunity for questions and clarification was provided.

## 2017-09-11 NOTE — PROGRESS NOTES
conducted an initial consultation and Spiritual Assessment for Ronnie Aguirre, who is a 80 y.o.,female. According to the patients chart [employer] Affiliation is: [employer]. The reason the Patient came to the hospital is:   Patient Active Problem List    Diagnosis Date Noted    Stridor 09/11/2017    Localized cancer of lip, oral cavity and throat ([facility] [address] 09/11/2017    Anxiety 09/11/2017    Heart failure ([address] 09/11/2017    Hyperkalemia 09/10/2017    CKD (chronic kidney disease) stage 4, GFR 15-29 ml/min ([address] 03/12/2017    Hypertensive urgency 07/17/2016    PAM (acute kidney injury) ([address] 07/17/2016    DM (diabetes mellitus) ([facility] [address] 07/17/2016    Elevated WBC count 07/17/2016    Hyponatremia 07/17/2016    CHF (congestive heart failure) ([facility] [address] 07/16/2016    Shortness of breath 05/03/2015    Angina pectoris ([facility] [address] 05/03/2015    CAD (coronary artery disease) 05/03/2015    HTN (hypertension) 05/03/2015    Chest pain, unspecified 01/28/2011        Initiated a relationship of care and support. Provided information about Spiritual Care Services. Offered prayer and assurance of continued prayers on patients behalf. Offered Sacrament of the [address] Plan:  Chaplains will continue to follow and will provide pastoral care as needed or requested.  recommends bedside caregivers page  on duty if patient shows signs of acute spiritual or emotional distress. Father BENJAMIN Nielsen. [facility]

## 2017-09-11 NOTE — ROUTINE PROCESS
Bedside shift change report given to Kimmie Villasenor RN (oncoming nurse) by Grace Thompson RN (offgoing nurse). Report included the following information SBAR, Kardex and MAR.

## 2017-09-11 NOTE — PROGRESS NOTES
Bedside and Verbal shift change report given to GLORIA Cedeno RN (oncoming nurse) by MARGE Pascal RN (offgoing nurse). Report included the following information SBAR, Kardex, Intake/Output, MAR and Recent Results.

## 2017-09-11 NOTE — ED NOTES
Tolerated tilley catheter insertion well.  Additional staff at bedside to monitor for sterile technique

## 2017-09-11 NOTE — DIABETES MGMT
GLYCEMIC CONTROL PROGRESS NOTE:    -pt with known h/o T2DM, uncontrolled home basal/bolus regimen   -BG out of target range requiring corrective coverage  <24 hour admission TDD = 6 unit  -IV steroids on board 40 mg q 8 hours  Recommendations:  Weight based basal/bolus regimen   *Lantus 20 units daily   *Humalog 5 units qac    *- Humalog Very Insulin Resistant Corrective Coverage  -anticipate pt will continue to need adjustments      Recent Glucose Results:   Lab Results   Component Value Date/Time     (H) 09/10/2017 09:26 PM    GLUCPOC 269 (H) 09/11/2017 06:27 AM       Michel Roman RN, MS  Glycemic Control Team

## 2017-09-11 NOTE — ED TRIAGE NOTES
Pt brought to ED by EMS from [facility] c/c SOB onset yesterday. Patient in NAD at this time, denies CP, reports some epigastric pain onset 2 days ago.

## 2017-09-11 NOTE — ROUTINE PROCESS
TRANSFER - OUT REPORT:    Verbal report given to Cherie Jernigan RN on [facility]  being transferred to [phone] for routine progression of care       Report consisted of patients Situation, Background, Assessment and   Recommendations(SBAR). Information from the following report(s) SBAR, ED Summary and MAR was reviewed with the receiving nurse. Lines:       Opportunity for questions and clarification was provided.       Patient transported with:   Monitor  Tech
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
## 2017-09-11 NOTE — PROGRESS NOTES
Hospitalist Progress Note-critical care note     Patient: Jonathan Kim MRN: [id number]  CSN: [id number]    YOB: 1932  Age: 80 y.o. 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 9/2017             Anxiety ICD-10-CM: F41.9  ICD-9-CM: 300.00  9/11/2017 Unknown        Heart failure ([facility] ICD-10-CM: I50.9  ICD-9-CM: 428.9  9/11/2017 Unknown        Hyperkalemia ICD-10-CM: E87.5  ICD-9-CM: 276.7  9/10/2017 Unknown        CKD (chronic kidney disease) stage 4, GFR 15-29 ml/min ([facility]) ICD-10-CM: N18.4  ICD-9-CM: 585.4  3/12/2017 Yes        PAM (acute kidney injury) ([facility] ICD-10-CM: N17.9  ICD-9-CM: 584.9  7/17/2016 Yes        DM (diabetes mellitus) ([facility] ICD-10-CM: E11.9  ICD-9-CM: 250.00  7/17/2016 Yes        * (Principal)CHF (congestive heart failure) ([facility]) ICD-10-CM: I50.9  ICD-9-CM: 428.0  7/16/2016 Unknown        Shortness of breath ICD-10-CM: R06.02  ICD-9-CM: 786.05  5/3/2015 Yes            1. CHF (congestive heart failure) ([facility])   Ef wnl, diastolic  Will continue diuretics            2 PAM (acute kidney injury) on ckd4   Renal on board , will continue monitor       3. DM (diabetes mellitus) ([facility] [address] (7/17/2016)  Hyperglycemia, increase lantus and premeal [id number]        4  Hyperkalemia (9/10/2017)   resolved      stridor   No airway problem so far, will continue iv steroid and abx        5. 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 Nisha Chase , Cricket schofield will see her tomorrow     6. Anxiety (9/11/2017)  Continue ativan prn     Discussed with daughter and asked for transfer to care [facility] -discussed with Dr. Nisha Chase, not accept pt and he will let dr. Juancho Jennings to see her tomorrow. 45 total min's spent on patient care including >50% on counseling/coordinating care. Discussed the above assessments. also discussed labs, medications and hospital course    Subjective: I have cancer   Nurse : daughter want to talk   Review of systems:    General: No fevers or chills. Cardiovascular: No chest pain or pressure. No palpitations. Pulmonary:  shortness of breath. Gastrointestinal: No nausea, vomiting. Vital signs/Intake and Output:  Visit Vitals    /73 (BP 1 Location: Left arm, BP Patient Position: At rest)    Pulse 76    Temp 98.1 °F (36.7 °C)    Resp 19    Ht 5' (1.524 m)    Wt 90.7 kg (199 lb 15.3 oz)    SpO2 98%    BMI 39.05 kg/m2     Current Shift:  09/11 0701 - 09/11 1900  In: -   Out: 2250 [Urine:2250]  Last three shifts:  09/09 1901 - 09/11 0700  In: -   Out: 2000 [Urine:2000]    Physical Exam:  General: WD, WN. Alert, cooperative, no acute distress    HEENT: NC, swelling and enlarged  of chin   PERRLA, anicteric sclerae. Lungs: CTA Bilaterally. No Wheezing/Rhonchi/Rales. Heart:  Regular  rhythm,  No murmur, No Rubs, No Gallops  Abdomen: Soft, Non distended, Non tender.  +Bowel sounds,   Extremities: No c/c/e  Psych:   anxious , no agitated. Neurologic:  No acute neurological deficit.              Labs: Results:       Chemistry Recent Labs      09/11/17   1210  09/11/17   0516  09/10/17   2323  09/10/17   2126   GLU   --   211*   --   154*   NA   --   122*   --   126*   K  5.0  6.3*  6.3*  6.5*   CL   --   90*   --   92*   CO2   --   19*   --   24   BUN   --   43*   --   42*   CREA   --   2.03*   --   2.08*   CA   --   9.6   --   9.3   AGAP   --   13   --   10   BUCR   --   21*   -- 20   AP   --   91   --   90   TP   --   7.4   --   6.8   ALB   --   3.2*   --   3.0*   GLOB   --   4.2*   --   3.8   AGRAT   --   0.8   --   0.8      CBC w/Diff Recent Labs      09/11/17   0516  09/10/17   2126   WBC  15.1*  13.4*   RBC  3.98*  4.10*   HGB  9.6*  9.8*   HCT  28.6*  29.7*   PLT  475*  477*   GRANS   --   81*   LYMPH   --   5*   EOS   --   1      Cardiac Enzymes Recent Labs      09/11/17   1210  09/11/17 0516   CPK  62  75   CKND1  1.8  2.1      Coagulation No results for input(s): PTP, INR, APTT in the last 72 hours. No lab exists for component: INREXT    Lipid Panel Lab Results   Component Value Date/Time    Cholesterol, total 124 07/17/2016 02:21 AM    HDL Cholesterol 41 07/17/2016 02:21 AM    LDL, calculated 57.6 07/17/2016 02:21 AM    VLDL, calculated 25.4 07/17/2016 02:21 AM    Triglyceride 127 07/17/2016 02:21 AM    CHOL/HDL Ratio 3.0 07/17/2016 02:21 AM      BNP No results for input(s): BNPP in the last 72 hours.    Liver Enzymes Recent Labs      09/11/17 0516   TP  7.4   ALB  3.2*   AP  91   SGOT  30      Thyroid Studies Lab Results   Component Value Date/Time    TSH 1.92 03/11/2017 04:41 AM        Procedures/imaging: see electronic medical records for all procedures/Xrays and details which were not copied into this note but were reviewed prior to creation of Aleena Almazan MD

## 2017-09-11 NOTE — ED NOTES
Returned to room d/t patient calling out for assistance. Patient remains on CCM with no s/s ectopy noted. Patient continues to report SOB at this time. Patient remains very anxious at this time. No s/s distress noted at this time.

## 2017-09-11 NOTE — ED PROVIDER NOTES
[address]  EMERGENCY DEPARTMENT HISTORY AND PHYSICAL EXAM       Date: 9/10/2017   Patient Name: Ramona Cohen   YOB: 1932  Medical Record Number: [id number]    History of Presenting Illness     Chief Complaint   Patient presents with    Shortness of Breath        History Provided By:  patient    Additional History: 8:54 PM   Ramona Cohen is a 80 y.o. female with PMHx of CHF presents to the emergency department via EMS C/O SOB onset today. Pt does not have a cardiologist. Pt does not use O2 at home. Pt lives at assisted living facility. Pt is currently being treated for CA. Pt repeatedly stated she needed her fluid drained immediately from her bladder and she knew she had fluid. PMHx includes CAD, HTN, anxiety and DM. SHx includes tobacco use (1ppd). Pt denies fever, chills, pain and any other symptoms or complaints. Written by José Miguel Wong ED Scribe, as dictated by Keith Haynes MD       Primary Care Provider: Marissa Guzman MD   Specialist:    Past History     Past Medical History:   Past Medical History:   Diagnosis Date    Anxiety     Arthritis     CAD (coronary artery disease)     Minor    Chest pain, unspecified 1/28/2011    Diabetes ([address] 1964    Adult onset for 39 years    Essential hypertension     Hiatal hernia     Hyperlipidemia     Hypothyroidism     Systolic hypertension         Past Surgical History:   Past Surgical History:   Procedure Laterality Date    CARDIAC SURG PROCEDURE UNLIST      three cardiac stents     HX CAROTID ENDARTERECTOMY      HX CHOLECYSTECTOMY      HX HYSTERECTOMY      NM RADIONUCLIDE ABLATION THERAPY          Family History:   History reviewed. No pertinent family history.      Social History:   Social History   Substance Use Topics    Smoking status: Former Smoker     Packs/day: 1.00    Smokeless tobacco: Never Used    Alcohol use No        Allergies:   No Known Allergies     Review of Systems Review of Systems   Constitutional: Negative for chills and fever. Respiratory: Positive for shortness of breath. Musculoskeletal: Negative for arthralgias and myalgias. All other systems reviewed and are negative. Physical Exam  Vitals:    09/10/17 2106   BP: 148/73   Pulse: 65   Resp: 20   Temp: 98.3 °F (36.8 °C)   SpO2: 97%   Weight: 90.7 kg (200 lb)   Height: 5' (1.524 m)       Physical Exam   Constitutional: She is oriented to person, place, and time. She appears well-developed and well-nourished. She appears ill (chronically). Worrisome    HENT:   Head: Normocephalic and atraumatic. Swelling and chronic skin changes to chin with some radiation markings    Airway is intact    Eyes: Pupils are equal, round, and reactive to light. Neck: Neck supple. Cardiovascular: Normal rate, regular rhythm, S1 normal, S2 normal and normal heart sounds. 2+ pitting edema    Pulmonary/Chest: No respiratory distress. She has no wheezes. She has no rales. She exhibits no tenderness. Crackles at bilateral bases    Abdominal: Soft. She exhibits no distension and no mass. There is no tenderness. There is no guarding. Obese    Musculoskeletal: Normal range of motion. She exhibits no edema or tenderness. Neurological: She is alert and oriented to person, place, and time. No cranial nerve deficit. Skin: No rash noted. Psychiatric: Her behavior is normal. Thought content normal. Her mood appears anxious. Nursing note and vitals reviewed.     Diagnostic Study Results     Labs -      Recent Results (from the past 12 hour(s))   CBC WITH AUTOMATED DIFF    Collection Time: 09/10/17  9:26 PM   Result Value Ref Range    WBC 13.4 (H) 4.6 - 13.2 K/uL    RBC 4.10 (L) 4.20 - 5.30 M/uL    HGB 9.8 (L) 12.0 - 16.0 g/dL    HCT 29.7 (L) 35.0 - 45.0 %    MCV 72.4 (L) 74.0 - 97.0 FL    MCH 23.9 (L) 24.0 - 34.0 PG    MCHC 33.0 31.0 - 37.0 g/dL    RDW 18.2 (H) 11.6 - 14.5 %    PLATELET 664 (H) 898 - 420 K/uL    MPV 8.7 (L) 9.2 - 11.8 FL    NEUTROPHILS 81 (H) 40 - 73 %    LYMPHOCYTES 5 (L) 21 - 52 %    MONOCYTES 13 (H) 3 - 10 %    EOSINOPHILS 1 0 - 5 %    BASOPHILS 0 0 - 2 %    ABS. NEUTROPHILS 10.8 (H) 1.8 - 8.0 K/UL    ABS. LYMPHOCYTES 0.7 (L) 0.9 - 3.6 K/UL    ABS. MONOCYTES 1.8 (H) 0.05 - 1.2 K/UL    ABS. EOSINOPHILS 0.2 0.0 - 0.4 K/UL    ABS. BASOPHILS 0.0 0.0 - 0.06 K/UL    DF AUTOMATED     METABOLIC PANEL, COMPREHENSIVE    Collection Time: 09/10/17  9:26 PM   Result Value Ref Range    Sodium 126 (L) 136 - 145 mmol/L    Potassium 6.5 (HH) 3.5 - 5.5 mmol/L    Chloride 92 (L) 100 - 108 mmol/L    CO2 24 21 - 32 mmol/L    Anion gap 10 3.0 - 18 mmol/L    Glucose 154 (H) 74 - 99 mg/dL    BUN 42 (H) 7.0 - 18 MG/DL    Creatinine 2.08 (H) 0.6 - 1.3 MG/DL    BUN/Creatinine ratio 20 12 - 20      GFR est AA 27 (L) >60 ml/min/1.73m2    GFR est non-AA 23 (L) >60 ml/min/1.73m2    Calcium 9.3 8.5 - 10.1 MG/DL    Bilirubin, total 0.4 0.2 - 1.0 MG/DL    ALT (SGPT) 14 13 - 56 U/L    AST (SGOT) 12 (L) 15 - 37 U/L    Alk.  phosphatase 90 45 - 117 U/L    Protein, total 6.8 6.4 - 8.2 g/dL    Albumin 3.0 (L) 3.4 - 5.0 g/dL    Globulin 3.8 2.0 - 4.0 g/dL    A-G Ratio 0.8 0.8 - 1.7     NT-PRO BNP    Collection Time: 09/10/17  9:26 PM   Result Value Ref Range    NT pro-BNP 3228 (H) 0 - 1800 PG/ML   CARDIAC PANEL,(CK, CKMB & TROPONIN)    Collection Time: 09/10/17  9:26 PM   Result Value Ref Range    CK 72 26 - 192 U/L    CK - MB 1.7 <3.6 ng/ml    CK-MB Index 2.4 0.0 - 4.0 %    Troponin-I, Qt. <0.02 0.00 - 0.06 NG/ML   EKG, 12 LEAD, INITIAL    Collection Time: 09/10/17  9:28 PM   Result Value Ref Range    Ventricular Rate 61 BPM    Atrial Rate 61 BPM    P-R Interval 206 ms    QRS Duration 96 ms    Q-T Interval 412 ms    QTC Calculation (Bezet) 414 ms    Calculated P Axis 34 degrees    Calculated R Axis -22 degrees    Calculated T Axis 84 degrees    Diagnosis       Normal sinus rhythm  Minimal voltage criteria for LVH, may be normal variant  Borderline ECG  When compared with ECG of 03-SEP-2017 16:54,  No significant change was found     URINALYSIS W/ RFLX MICROSCOPIC    Collection Time: 09/10/17  9:54 PM   Result Value Ref Range    Color YELLOW      Appearance CLOUDY      Specific gravity 1.010 1.005 - 1.030      pH (UA) 7.5 5.0 - 8.0      Protein 30 (A) NEG mg/dL    Glucose NEGATIVE  NEG mg/dL    Ketone NEGATIVE  NEG mg/dL    Bilirubin NEGATIVE  NEG      Blood NEGATIVE  NEG      Urobilinogen 0.2 0.2 - 1.0 EU/dL    Nitrites POSITIVE (A) NEG      Leukocyte Esterase LARGE (A) NEG     URINE MICROSCOPIC ONLY    Collection Time: 09/10/17  9:54 PM   Result Value Ref Range    WBC >100 (H) 0 - 5 /hpf    RBC 0 to 2 0 - 5 /hpf    Bacteria 3+ (A) NEG /hpf       Radiologic Studies -  The following have been ordered and reviewed:  XR CHEST PORT    (Results Pending)     RADIOLOGY FINDINGS  chest X-ray shows CHF  Pending review by Radiologist  Recorded by Anahi Juárez ED Scribe, as dictated by Diandra Hodgson MD          Medical Decision Making   I am the first provider for this patient. I reviewed the vital signs, available nursing notes, past medical history, past surgical history, family history and social history. Vital Signs-Reviewed the patient's vital signs. Patient Vitals for the past 12 hrs:   Temp Pulse Resp BP SpO2   09/10/17 2106 98.3 °F (36.8 °C) 65 20 148/73 97 %       Pulse Oximetry Analysis - Normal 97% on RA     Cardiac Monitor:   Rate: 65 bpm  Rhythm: Normal Sinus Rhythm     EKG interpretation: (Preliminary)  Rhythm: NSR. Rate (approx.): 61 bpm   EKG read by Diandra Hodgson MD  at 9:28 PM    Old Medical Records: Old medical records. Previous electrocardiograms. Nursing notes. Ambulance run sheet. Previous radiology studies. INITIAL CLINICAL IMPRESSION and PLANS:  The patient presents with the primary complaint(s) of: SOB. The presentation, to include historical aspects and clinical findings are consistent with the DX of SOB. However, other possible DX's to consider as primary, associated with, or exacerbated by include:     1.  panic attack, anxiety, CHF, PNA, urine retention, electrolyte  abnormality, MI, ACS     Considering the above, my initial management plan to evaluate and therapeutic interventions include the following and as noted in the orders:    1. EKG, CXR, CBC, CMP, UA, Pro BNP, Cardiac Panel     Recorded by KATTY Snyderibe, as dictated by Ny Pérez MD.     Procedures:   Procedures    ED Course:  8:54 PM  Initial assessment performed. The patients presenting problems have been discussed, and they are in agreement with the care plan formulated and outlined with them. I have encouraged them to ask questions as they arise throughout their visit. 11:20 PM   Discussed patient's history, exam, and available diagnostics results with Dr. Brody Landry, hospitalist, who agree with  Will admit pt. Medications Given in the ED:  Medications   furosemide (LASIX) injection 60 mg (not administered)   cefTRIAXone (ROCEPHIN) 1 g in 0.9% sodium chloride (MBP/ADV) 50 mL MBP (not administered)       ADMISSION NOTE:  11:25 PM  Patient is being admitted to the hospital by Dr. Brody Landry. The results of their tests and reasons for their admission have been discussed with them and/or available family. They convey agreement and understanding for the need to be admitted and for their admission diagnosis. Written by KATTY Snyder, as dictated by Ny Pérez MD .    CONDITIONS ON ADMISSION:  Deep Vein Thrombosis is not present at the time of admission. Thrombosis is not present at the time of admission. Urinary Tract Infection is present at the time of admission. Pneumonia is not present at the time of admission. MRSA is not present at the time of admission. Wound infection is not present at the time of admission. Pressure Ulcer is not present at the time of admission.       CLINICAL IMPRESSION    1. Acute systolic congestive heart failure ([facility]    2. Anasarca    3. CRF (chronic renal failure), unspecified stage    4. Acute cystitis without hematuria       11:57 PM  I have spent 40 minutes of critical care time involved in lab review, consultations with specialist, family decision-making, and documentation. During this entire length of time I was immediately available to the patient. Critical Care: The reason for providing this level of medical care for this critically ill patient was due a critical illness that impaired one or more vital organ systems such that there was a high probability of imminent or life threatening deterioration in the patients condition. This care involved high complexity decision making to assess, manipulate, and support vital system functions, to treat this degreee vital organ system failure and to prevent further life threatening deterioration of the patients condition. _______________________________   Attestations: This note is prepared by Isaias Caba , acting as a Scribe for Ana Benitez MD  on 8:54 PM on 9/10/2017 . Ana Benitez MD : The scribe's documentation has been prepared under my direction and personally reviewed by me in its entirety.   _______________________________
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
## 2017-09-11 NOTE — H&P
History & Physical    Patient: Darron Marks MRN: [id number]  CSN: [id number]    YOB: 1932  Age: 80 y.o. Sex: female      DOA: 9/10/2017    Chief Complaint:   Chief Complaint   Patient presents with    Shortness of Breath          HPI:     Darron Marks is a 80 y.o.  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 Kishore Chavez Receiving out patient radiation therapy her anxiety has wosened getting ativan at [facility] facility with minimal relief   In ER found to have worsening renal insufficency  And hyperkalemia renal consult called by ER doctor  Also found to have 600 cc of urinary retention upon insertion of tilley in ER    Past Medical History:   Diagnosis Date    Anxiety     Arthritis     CAD (coronary artery disease)     Minor    Chest pain, unspecified 1/28/2011    Diabetes ([address] 1964    Adult onset for 39 years    Essential hypertension     Hiatal hernia     Hyperlipidemia     Hypothyroidism     Systolic hypertension        Past Surgical History:   Procedure Laterality Date    CARDIAC SURG PROCEDURE UNLIST      three cardiac stents     HX CAROTID ENDARTERECTOMY      HX CHOLECYSTECTOMY      HX HYSTERECTOMY      NM RADIONUCLIDE ABLATION THERAPY         History reviewed. No pertinent family history.     Social History     Social History    Marital status:      Spouse name: N/A    Number of children: N/A    Years of education: N/A     Social History Main Topics    Smoking status: Former Smoker     Packs/day: 1.00    Smokeless tobacco: Never Used    Alcohol use No    Drug use: No    Sexual activity: Not Asked     Other Topics Concern    None     Social History Narrative       Prior to Admission medications    Medication Sig Start Date End Date Taking? Authorizing Provider   labetalol (NORMODYNE) 100 mg tablet Take 100 mg by mouth two (2) times a day. Tram Jackman MD   furosemide (LASIX) 40 mg tablet Take 1 Tab by mouth daily for 20 days. 9/3/17 9/23/17  Joanne Arreguin MD   nystatin-triamcinolone (MYCOLOG) 100,000-0.1 unit/gram-% ointment Apply  to affected area two (2) times a day. 8/1/17   Phyllis Echevarria MD   metoprolol tartrate (LOPRESSOR) 50 mg tablet Take 0.5 Tabs by mouth two (2) times a day. 3/13/17   Ezra Petersen MD   diphenhydrAMINE (BENADRYL) 25 mg tablet Take 25 mg by mouth every six (6) hours as needed for Sleep. Tram Jackman MD   melatonin 3 mg tablet Take 3 mg by mouth. Tram Jackman MD   miconazole (MICONAZOLE 7) 2 % vaginal cream Insert 1 Applicator into vagina nightly. Tram Jackman MD   calcium carbonate (HODAN-GEST ANTACID) 200 mg calcium (500 mg) chew Take 2 Tabs by mouth three (3) times daily. Tram Jackman MD   loperamide (IMMODIUM) 2 mg tablet Take 2 mg by mouth daily. Tram Jackman MD   naphazoline-pheniramine (NAPHCON-A) 0.025-0.3 % ophthalmic solution Administer  to both eyes. Tram Jackman MD   clotrimazole-betamethasone (LOTRISONE) 1-0.05 % lotion Apply  to affected area two (2) times a day. Tram Jackman MD   acetaminophen (TYLENOL) 325 mg tablet Take 650 mg by mouth three (3) times daily as needed for Pain. Tram Jackman MD   meclizine (ANTIVERT) 12.5 mg tablet Take 12.5 mg by mouth four (4) times daily as needed. Tram Jackman MD   polyethylene glycol (MIRALAX) 17 gram/dose powder Take 17 g by mouth as needed. Tram Jackman MD   traMADol (ULTRAM) 50 mg tablet Take 50 mg by mouth every six (6) hours as needed for Pain. Tram Jackman MD   pitavastatin (LIVALO) 2 mg tablet Take 1 mg by mouth daily. Tram Jackman MD   magnesium oxide (MAG-OX) 400 mg tablet Take 400 mg by mouth daily.     Phys Other, MD   promethazine (PHENERGAN) 6.25 mg/5 mL syrup Take 6.25 mg by mouth four (4) times daily as needed for Nausea. Tram Jackman MD   cloNIDine HCl (CATAPRES) 0.2 mg tablet Take 1 Tab by mouth two (2) times a day. Patient taking differently: Take 0.2 mg by mouth three (3) times daily. 5/7/15   Vlad Bull MD   LORazepam (ATIVAN) 0.5 mg tablet Take 1 Tab by mouth every eight (8) hours as needed for Anxiety. Max Daily Amount: 1.5 mg. 3/2/15   GARCIA Duarte   amlodipine (NORVASC) 5 mg tablet Take 10 mg by mouth daily. Historical Provider   aspirin 81 mg tablet Take 81 mg by mouth daily. Historical Provider   cholecalciferol, vitamin d3, (VITAMIN D) 1,000 unit tablet Take 1,000 Units by mouth daily. Historical Provider   levothyroxine (SYNTHROID) 125 mcg tablet Take 150 mcg by mouth Daily (before breakfast). Historical Provider   pyridoxine (VITAMIN B-6) 50 mg tablet Take 50 mg by mouth daily. Historical Provider   atorvastatin (LIPITOR) 20 mg tablet Take  by mouth daily. Historical Provider   pantoprazole (PROTONIX) 40 mg tablet Take 40 mg by mouth daily. Historical Provider   INSULIN ASPART (NOVOLOG SC) 12 Units by SubCUTAneous route three (3) times daily (with meals). Indications: breakfast and lunch 1/28/11   Historical Provider   clopidogrel (PLAVIX) 75 mg tablet Take 75 mg by mouth daily. Historical Provider   isosorbide mononitrate ER (IMDUR) 30 mg tablet Take 30 mg by mouth two (2) times a day. Historical Provider   nitroglycerin (NITROQUICK) 0.4 mg SL tablet 0.4 mg by SubLINGual route every five (5) minutes as needed. Historical Provider   INSULIN Lavell Baldy. REC. ANLOG (LANTUS SC) 55 Units by SubCUTAneous route as directed. 1/28/11   Historical Provider       No Known Allergies      Review of Systems  GENERAL: Patient alert, awake and oriented times 3, able to communicate full sentences and not in distress. HEENT: No change in vision, no earache, tinnitus, sore throat or sinus congestion. NECK: No pain or stiffness. PULMONARY: No shortness of breath, cough or wheeze. Cardiovascular: no pnd or orthopnea, no CP  GASTROINTESTINAL: No abdominal pain, nausea, vomiting or diarrhea, melena or bright red blood per rectum. GENITOURINARY: No urinary frequency, urgency, hesitancy or dysuria. MUSCULOSKELETAL: No joint or muscle pain, no back pain, no recent trauma. DERMATOLOGIC: No rash, no itching, no lesions. ENDOCRINE: No polyuria, polydipsia, no heat or cold intolerance. No recent change in weight. HEMATOLOGICAL: No anemia or easy bruising or bleeding. NEUROLOGIC: No headache, seizures, numbness, tingling or weakness. Physical Exam:     Physical Exam:  Visit Vitals    /64 (BP 1 Location: Left arm, BP Patient Position: At rest)    Pulse 87    Temp 98.3 °F (36.8 °C)    Resp 26    Ht 5' (1.524 m)    Wt 90.7 kg (200 lb)    SpO2 94%    BMI 39.06 kg/m2      O2 Device: Room air    Temp (24hrs), Av.3 °F (36.8 °C), Min:98.3 °F (36.8 °C), Max:98.3 °F (36.8 °C)             General:  Alert, cooperative, mod  distress, appears stated age. Head: Normocephalic, without obvious abnormality, atraumatic. Eyes:  Conjunctivae/corneas clear. PERRL, EOMs intact. Nose: Nares normal. No drainage or sinus tenderness. audible stridor upper airway    Neck: Supple, radiation changes across mandible and neck with raiation marker . Lungs:   Clear to auscultation bilaterally. Heart:  Regular rate and rhythm, S1, S2 normal.     Abdomen: Soft, non-tender. Bowel sounds normal. anarsaca    Extremities: Extremities normal, atraumatic, no cyanosis plus 3 pittting edema. Bilateral lower extremites    Pulses: 2+ and symmetric all extremities. Skin:  No rashes or lesions   Neurologic: AAOx3, No focal motor or sensory deficit. Labs Reviewed: All lab results for the last 24 hours reviewed.   CXR and EKG    Procedures/imaging: see electronic medical records for all procedures/Xrays and details which were not copied into this note but were reviewed prior to creation of Plan      Assessment/Plan     Principal Problem:    CHF (congestive heart failure) ([facility] (7/16/2016)    Active Problems:    Shortness of breath (5/3/2015)      PAM (acute kidney injury) ([facility] (7/17/2016)      DM (diabetes mellitus) ([facility] (7/17/2016)      CKD (chronic kidney disease) stage 4, GFR 15-29 ml/min ([facility]) (3/12/2017)      Hyperkalemia (9/10/2017)      Stridor (9/11/2017)      Localized cancer of lip, oral cavity and throat ([facility] (9/11/2017)      Overview: 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 CT of neck   DVT/GI Prophylaxis: Lovenox and Hep SQ    Discussed with patient at bedside about hospital admission and my plan care, who understood and agree with my plan care.     Matilda Vickers MD  9/11/2017 1:50 AM    Admission is for 10pm 9/10   Called about admission on 9/10  Note late entry several patient seen inbetween documentation

## 2017-09-11 NOTE — ED NOTES
Returned to room d/t patient calling for assistance d/t reported SOB. Attempt to assist patient to upright position as it is noted that patient has moved down in bed. Patient states, \"i can't sit up because I have a bad back\". Informed patient that upright position would help with reported breathing difficulty. Patient insisting that she could not sit d/t back pain.

## 2017-09-11 NOTE — PROGRESS NOTES
0700 Assumed care of patient. Patient in bed. Introduced self. White board completed. Bed in lowest position. Phone and call bone with in reach. AFloydRN  [id number] Patient stated \" I feel short of breath\". Patient placed on 2 L via nasal cannula. Patient bed placed in high fowlers. A&Ox4. Assist x 1. 20 gauge in right AC placed 9/11, patent. Flushed with 10 mL NS.  16 Uzbek Rocha placed 9/10 flowing freely. Bed in lowest position. Call bell and phone within reach. See flow sheet. AFloydRN  1110 Patient in semi-fowlers position in bed. Patient stated Severiano Crooked was nervous\" and \"has anxiety\". I instructed her to breathe slowly and listen to my voice. Patient stated \"she felt better\". Bed in lowest position. Call bell and phone within reach. AFloydRN  1325 Assisted patient with lunch. Bed in lowest position. Call bell and phone within reach. AFloydRN  1350 Patient reassessed. See flow sheet. Bed in lowest position. Call bell and phone with in reach. AFloydRN  [id number] Patient resting in bed. Bed in lowest position. Call bell and phone with in reach. AFloydRN  [id number] Patient resting in bed. Bed in lowest position. Call bell and phone with in reach. AFloydRN  [id number] Patient reassessed. See flow sheet.

## 2017-09-11 NOTE — ED NOTES
Presented to ED from [facility] facility to be evaluated for reported SOB x 2 days. No s/s distress is noted at this time. Patient is noted to be very anxious on assessment. Patient denies any c/o pain at time of assessment. Patient states, \"i wanted to come yesterday but they wouldn't let me\". Patient insisting to have curtains open d/t reported claustrophobia. Informed patient that d/t patient privacy that curtains could be open, but not to visible to other patients. Patient verbalized understanding at this time. Patient given call light for assistance.

## 2017-09-11 NOTE — CONSULTS
RENAL CONSULT  2017    Patient:  Andrez Gracia  :  3/16/1932  Gender:  female  MRN #:  [id number]    Consulting Physician:  Mel Lee DO,  Assessment:    )Principal Problem:    CHF (congestive heart failure) ([address] (2016)    Active Problems:    Shortness of breath (5/3/2015)      PAM (acute kidney injury) ([address] (2016)      DM (diabetes mellitus) ([address] (2016)      CKD (chronic kidney disease) stage 4, GFR 15-29 ml/min ([facility]) (3/12/2017)      Hyperkalemia (9/10/2017)      Stridor (2017)      Localized cancer of lip, oral cavity and throat ([address] (2017)      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 Will stop one of them  Need urine culture and Empiric Levo while the culture come back  Hold Benadryl and Meclizine  Increased Diuretics for CHF and fluid overload  Unable to swallow, so need to be on Nepr 3-5 cans a day. History of Present Illness:  Andrez Gracia is a 80y.o. year old female with ongoing DM, HTN, CAD, who Mandibular Carcinoma and gets Radiation. PT is had been feeling SOB and retaining fluid with some chest pressure and Dysuria. Pt here was found to have Hyperkalemia. Rocha was place and pt had 600 ml of urine. PT on low K diet. She had CKD with cr in 2.2 range.          Past Medical History:   Diagnosis Date    Anxiety     Arthritis     CAD (coronary artery disease)     Minor    Chest pain, unspecified 2011    Diabetes ([address] 1964    Adult onset for 39 years    Essential hypertension     Hiatal hernia     Hyperlipidemia     Hypothyroidism     Systolic hypertension      Past Surgical History:   Procedure Laterality Date    CARDIAC SURG PROCEDURE UNLIST      three cardiac stents     HX CAROTID ENDARTERECTOMY      HX CHOLECYSTECTOMY      HX HYSTERECTOMY      NM RADIONUCLIDE ABLATION THERAPY       History reviewed. No pertinent family history.   No Known Allergies  Current Facility-Administered Medications   Medication Dose Route Frequency Provider Last Rate Last Dose    methylPREDNISolone (PF) (SOLU-MEDROL) 40 mg/mL injection             acetaminophen (TYLENOL) tablet 650 mg  650 mg Oral Q4H PRN Verena Roa MD        amLODIPine (NORVASC) tablet 10 mg  10 mg Oral DAILY Verena Roa MD   10 mg at 09/11/17 0945    aspirin delayed-release tablet 81 mg  81 mg Oral DAILY Verena Roa MD        calcium carbonate (TUMS) chewable tablet 400 mg [elemental]  400 mg Oral TID Verena Roa MD   400 mg at 09/11/17 0950    cholecalciferol (VITAMIN D3) tablet 1,000 Units  1,000 Units Oral DAILY Verena Roa MD        cloNIDine HCl (CATAPRES) tablet 0.2 mg  0.2 mg Oral BID Verena Roa MD   0.2 mg at 09/11/17 0946    clopidogrel (PLAVIX) tablet 75 mg  75 mg Oral DAILY Verena Roa MD        clotrimazole-betamethasone (LOTRISONE) 1-0.05 % cream   Topical BID Verena Roa MD        diphenhydrAMINE (BENADRYL) capsule 25 mg  25 mg Oral Q6H PRN Verena Roa MD        isosorbide mononitrate ER (IMDUR) tablet 30 mg  30 mg Oral BID Verena Roa MD   30 mg at 09/11/17 0947    levothyroxine (SYNTHROID) tablet 150 mcg  150 mcg Oral ACB Verena Roa MD   150 mcg at 09/11/17 0735    LORazepam (ATIVAN) tablet 0.5 mg  0.5 mg Oral Q8H PRN Verena Roa MD        magnesium oxide (MAG-OX) tablet 400 mg  400 mg Oral DAILY Verena Roa MD        meclizine (ANTIVERT) tablet 12.5 mg  12.5 mg Oral Q6H PRN Verena Roa MD        metoprolol tartrate (LOPRESSOR) tablet 25 mg  25 mg Oral BID Verena Roa MD   25 mg at 09/11/17 0948    nitroglycerin (NITROSTAT) tablet 0.4 mg  0.4 mg SubLINGual PRN Cathryn Foley MD        pantoprazole (PROTONIX) tablet 40 mg  40 mg Oral DAILY Cathryn Foley MD        pitavastatin calcium (LIVALO) tablet 1 mg  1 mg Oral DAILY Cathryn Foley MD        polyethylene glycol (MIRALAX) packet 17 g  17 g Oral PRN Cathryn Foley MD        promethazine (PHENERGAN) 6.25 mg/5 mL syrup 6.25 mg  6.25 mg Oral QID PRN Cathryn Foley MD        pyridoxine (vitamin B6) (VITAMIN B-6) tablet 50 mg  50 mg Oral DAILY Cathryn Foley MD        traMADol Armando Boykin) tablet 50 mg  50 mg Oral Q6H PRN Cathryn Foley MD        furosemide (LASIX) injection 60 mg  60 mg IntraVENous Q12H Cathryn Foley MD   60 mg at 09/11/17 7204    naloxone (NARCAN) injection 0.4 mg  0.4 mg IntraVENous PRN Cathryn Foley MD        heparin (porcine) injection 5,000 Units  5,000 Units SubCUTAneous Q8H Cathryn Foley MD        insulin lispro (HUMALOG) injection   SubCUTAneous AC&HS Cathryn Foley MD   6 Units at 09/11/17 0735    glucose chewable tablet 16 g  4 Tab Oral PRN Cathryn Foley MD        glucagon (GLUCAGEN) injection 1 mg  1 mg IntraMUSCular PRN Cathryn Foley MD        dextrose (D50W) injection syrg 12.5-25 g  25-50 mL IntraVENous PRN Cathryn Foley MD        methylPREDNISolone (PF) (SOLU-MEDROL) injection 40 mg  40 mg IntraVENous Q8H Cathryn Foley MD   40 mg at 09/11/17 0735    albuterol-ipratropium (DUO-NEB) 2.5 MG-0.5 MG/3 ML  3 mL Nebulization Q4H RT Cathryn Foley MD   3 mL at 09/11/17 0868    morphine injection 1 mg  1 mg IntraVENous Q4H PRN Cathryn Foley MD        melatonin tablet 5 mg  5 mg Oral QHS Cathryn Foley MD   Stopped at 09/11/17 0400    insulin glargine (LANTUS) injection 20 Units  20 Units SubCUTAneous DAILY Megha Ritter MD   20 Units at 09/11/17 1000    insulin lispro (HUMALOG) injection 5 Units  5 Units SubCUTAneous Phyllistkristine Dela Cruz MD        chlorothiazide (DIURIL) 500 mg in 0.9% sodium chloride 50 mL IVPB  500 mg IntraVENous DAILY Anni Alex DO        levoFLOXacin (LEVAQUIN) 500 mg in D5W IVPB  500 mg IntraVENous Q48H Anni Alex DO           Review of Symptoms:  Below symptoms negative if not in [place]. Consitutional Symptoms: Fever, weight loss, weight gain, fatigue  Eyes:  pain, sudden vision loss, blurry vision, double vision             bleeding nose, sore throat, hoarseness  GI: nausea, vomiting, diarrhea, pain, blood in stool  Pulmonary; Cough, Shortness of breath, Wheezing's  Cardiac: chest pain, palpitation  Musculoskeletal: difficulty walking, falls, pain over muscle, joint pain, weakness  : dysuria, blood in urine, pain with urination, difficulty voiding  Neurologia: dizziness, syncope, focal weakness, difficulty speaking  Integumentary: rash, redness, ulcer   Psychiatric:  Depression suicidal ideation    Objective:  Visit Vitals    /49 (BP 1 Location: Left arm, BP Patient Position: At rest)    Pulse 88    Temp 98 °F (36.7 °C)    Resp 21    Ht 5' (1.524 m)    Wt 90.7 kg (199 lb 15.3 oz)    SpO2 97%    BMI 39.05 kg/m2         Well developed and groomed, obese with swollen and red jaw  Eyes: anicteric  Ears, nose, mouth and throat without visible mass, scars, lesion  Neck: supple  Respiratory: decreased effort, crackles. Cardiovascular:  Normal rate, regular rythem normal S1 S2 no rubs or gallops  GI: soft, nontender  Musculoskeletal: No clubbing cynosis, no petechia  Skin:  Edema of legs, scratch marks left leg  Neruoligcal: no focal dificit grossly  Psychicatric: poor judgment and insights, good memory. No luisito affect. Non anxiouse.           Laboratory Data:  Lab Results   Component Value Date    BUN 42 (H) 09/10/2017    BUN 46 (H) 09/03/2017    BUN 54 (H) 07/31/2017     (L) 09/10/2017     (L) 09/03/2017     07/31/2017    CO2 24 09/10/2017    CO2 27 09/03/2017    CO2 29 07/31/2017     Lab Results   Component Value Date    WBC 13.4 (H) 09/10/2017    HGB 9.8 (L) 09/10/2017    HCT 29.7 (L) 09/10/2017         Imaging have been reviewed:  )Xr Chest Sngl V    Result Date: 9/4/2017  EXAM: AP radiograph of the chest INDICATION: Shortness of breath COMPARISON: March 10, 2017, July 16, 2016  FINDINGS: Normal heart size and mediastinal contour. No consolidation, pleural effusion, or pneumothorax. No acute osseous abnormalities. IMPRESSION: No acute pulmonary findings     Xr Knee Lt Min 4 V    Result Date: 8/1/2017  Indication: Fall, pain. Comments: AP, lateral, and bilateral oblique views of the left knee are submitted for evaluation. There is no evidence of fracture or dislocation. No joint effusion is identified. There is small superior and inferior patella enthesophytes. Minimal articular surface irregularity of the patella. Atherosclerotic calcifications noted. The soft tissue structures are unremarkable. Osseous mineralization is normal.     IMPRESSION: No evidence for acute fracture or dislocation. Mild degenerative changes and atherosclerosis. Ct Head Wo Cont    Result Date: 8/1/2017  CT scan of the head without IV contrast Images: 154  HISTORY: Status post fall. TECHNIQUE: Serial axial images were obtained from the foramen magnum to the skull vertex without IV contrast. One or more dose reduction techniques were used on this CT: automated exposure control, adjustment of the mAs and/or kVp according to patient's size, and iterative reconstruction techniques. The specific techniques utilized on this CT exam have been documented in the patient's electronic medical record. COMPARISON:  None. FINDINGS: The sulci, ventricles, and basal cisterns are within normal limits for age with age concordant atrophy. There is no intracranial hemorrhage, mass-effect, or midline shift. No extra-axial fluid collections are identified. Periventricular white matter hypodensities are noted.  These are nonspecific but often seen with chronic small vessel ischemic change. Otherwise, there are no significant areas of abnormal parenchymal attenuation. The visualized portions of the paranasal sinuses and mastoid air cells show no air-fluid levels. The visualized bones are intact. IMPRESSION: No evidence of acute traumatic injury to the brain or cranium. Atrophy and deep white matter changes are identified. Xr Chest Port    Result Date: 9/11/2017  EXAM: Chest portable INDICATION: Short of breath COMPARISON: Single view chest 9/3/2017  FINDINGS: AP portable chest film was performed. Suboptimal inspiration similar to previous exam. There are new bilaterally symmetrically increased interstitial markings. No focal infiltrate. No definite effusion. No pneumothorax. Heart is at the upper limits of normal. Mild central vascular congestion. IMPRESSION: 1. New symmetrically increased interstitial infiltrates. Differential includes acute onset interstitial pulmonary edema which is felt to be most likely. Acute interstitial pneumonitis is felt to be less likely. Xr Spine Lumb Trauma 2 V    Result Date: 8/1/2017  AP AND LATERAL LUMBAR SPINE: Indication:  Status post fall. Back pain. AP and cross table lateral views, total 4 films. Five non rib bearing lumbar vertebra. There is no evidence of compression fracture. Minimal anterolisthesis of L4 and L5 likely on degenerative basis. Moderate to marked loss of disc height at L2-3, through L5-S1. Marked to severe facet arthropathy at all levels. Marked aortic atherosclerosis. Bilateral hip arthropathy. IMPRESSION: 1. No compression fracture. 2. Multilevel spondylosis with moderate to marked degenerative disease and facet arthropathy. Time spent 65 minutes.     )Anni Jane DO,

## 2017-09-11 NOTE — PROGRESS NOTES
PT orders received and chart reviewed. Pt refused PT attempt for IP eval as pt states \"I can not do any physical therapy today. I don't want to live like this and I am very sick. Please just feed me some egg. \"  Fed pt small bites of egg per pt request.  Pt reports she does ambulate in [facility] w/ walker in room and was ambulating to meals however was unable due to SOB and weakness in the last few days. Will attempt PT IP eval this afternoon as pt schedule allows and pt willingness to participate. Thank you.

## 2017-09-11 NOTE — PROGRESS NOTES
Problem: Falls - Risk of  Goal: *Absence of Falls  Document Sarita Fall Risk and appropriate interventions in the flowsheet.    Outcome: Progressing Towards Goal  Fall Risk Interventions:  Mobility Interventions: Bed/chair exit alarm, PT Consult for mobility concerns, OT consult for ADLs, Utilize walker, cane, or other assitive device, Utilize gait belt for transfers/ambulation           Medication Interventions: Bed/chair exit alarm, Patient to call before getting OOB, Teach patient to arise slowly, Assess postural VS orthostatic hypotension, Utilize gait belt for transfers/ambulation     Elimination Interventions: Bed/chair exit alarm, Toileting schedule/hourly rounds

## 2017-09-11 NOTE — PROGRESS NOTES
Chart Reviewed. Noted patient admitted to the hospital for further medical management. Care Management to follow up with patient and/or family for transition of care needs prn. Readmission Risk Assessment:     High Risk and [facility] patients    RRAT Score:  21 or greater    Initial Assessment: presents to the emergency department via EMS C/O SOB onset today patient admitted for medical management for CHF     Emergency Contact:  See face sheet    Pertinent Medical Hx:    Anxiety, CAD, HTN, DM, CHF, Hypothyroidism    PCP/Specialists:  PREET Jeffery     Community Services:      DME:         High Risk Care Transition Plan:  1. Evaluate for [facility] or [facility], SNF, acute rehab, community care coordination of Resources. 2. Involve patient/caregiver in assessment, planning, education and implement of intervention. 3. CM daily patient care huddles/interdisciplinary rounds. 4. PCP/Specialist appointment within 48 hours of discharge unless otherwise specified. 5. Facilitate transportation and logistics for follow-up appointments. 6. Medication reconciliation [id number]  7. Discharge follow-up phone call within 2  4 days (NN, [facility], Nursing) CM follow up as assigned. 8. Formal handoff between hospital provider and post-acute provider to transition patient  9. Handoff to [id number] Nurse Navigator or PCP practice. Care Management Interventions  PCP Verified by CM:  Yes  Transition of Care Consult (CM Consult): Discharge Planning     Care manager available and will assist with safe transition of care

## 2017-09-11 NOTE — PROGRESS NOTES
Assumed care of pt. Pt asked that med be broken up since there are so many all B/P meds given. Will give rest later.

## 2017-09-12 LAB
ALBUMIN SERPL-MCNC: 3 G/DL (ref 3.4–5)
ALBUMIN/GLOB SERPL: 0.7 {RATIO} (ref 0.8–1.7)
ALP SERPL-CCNC: 85 U/L (ref 45–117)
ALT SERPL-CCNC: 15 U/L (ref 13–56)
ANION GAP SERPL CALC-SCNC: 11 MMOL/L (ref 3–18)
AST SERPL-CCNC: 11 U/L (ref 15–37)
BASOPHILS # BLD: 0 K/UL (ref 0–0.1)
BASOPHILS NFR BLD: 0 % (ref 0–3)
BILIRUB SERPL-MCNC: 0.4 MG/DL (ref 0.2–1)
BUN SERPL-MCNC: 49 MG/DL (ref 7–18)
BUN/CREAT SERPL: 25 (ref 12–20)
CALCIUM SERPL-MCNC: 9.8 MG/DL (ref 8.5–10.1)
CHLORIDE SERPL-SCNC: 91 MMOL/L (ref 100–108)
CO2 SERPL-SCNC: 28 MMOL/L (ref 21–32)
CREAT SERPL-MCNC: 1.99 MG/DL (ref 0.6–1.3)
DIFFERENTIAL METHOD BLD: ABNORMAL
EOSINOPHIL # BLD: 0 K/UL (ref 0–0.4)
EOSINOPHIL NFR BLD: 0 % (ref 0–5)
ERYTHROCYTE [DISTWIDTH] IN BLOOD BY AUTOMATED COUNT: 18 % (ref 11.6–14.5)
GLOBULIN SER CALC-MCNC: 4.2 G/DL (ref 2–4)
GLUCOSE BLD STRIP.AUTO-MCNC: 198 MG/DL (ref 70–110)
GLUCOSE BLD STRIP.AUTO-MCNC: 214 MG/DL (ref 70–110)
GLUCOSE BLD STRIP.AUTO-MCNC: 235 MG/DL (ref 70–110)
GLUCOSE BLD STRIP.AUTO-MCNC: 311 MG/DL (ref 70–110)
GLUCOSE SERPL-MCNC: 244 MG/DL (ref 74–99)
HCT VFR BLD AUTO: 30.5 % (ref 35–45)
HGB BLD-MCNC: 10.4 G/DL (ref 12–16)
LACTATE SERPL-SCNC: 1.2 MMOL/L (ref 0.4–2)
LYMPHOCYTES # BLD: 0.2 K/UL (ref 0.8–3.5)
LYMPHOCYTES NFR BLD: 1 % (ref 20–51)
MAGNESIUM SERPL-MCNC: 2.3 MG/DL (ref 1.6–2.6)
MCH RBC QN AUTO: 24.4 PG (ref 24–34)
MCHC RBC AUTO-ENTMCNC: 34.1 G/DL (ref 31–37)
MCV RBC AUTO: 71.6 FL (ref 74–97)
METAMYELOCYTES NFR BLD MANUAL: 1 %
MONOCYTES # BLD: 0.4 K/UL (ref 0–1)
MONOCYTES NFR BLD: 2 % (ref 2–9)
MYELOCYTES NFR BLD MANUAL: 1 %
NEUTS BAND NFR BLD MANUAL: 2 % (ref 0–5)
NEUTS SEG # BLD: 20 K/UL (ref 1.8–8)
NEUTS SEG NFR BLD: 93 % (ref 42–75)
PLATELET # BLD AUTO: 514 K/UL (ref 135–420)
PMV BLD AUTO: 8.8 FL (ref 9.2–11.8)
POTASSIUM SERPL-SCNC: 4 MMOL/L (ref 3.5–5.5)
POTASSIUM SERPL-SCNC: 4.2 MMOL/L (ref 3.5–5.5)
PROT SERPL-MCNC: 7.2 G/DL (ref 6.4–8.2)
RBC # BLD AUTO: 4.26 M/UL (ref 4.2–5.3)
RBC MORPH BLD: ABNORMAL
RBC MORPH BLD: ABNORMAL
SODIUM SERPL-SCNC: 130 MMOL/L (ref 136–145)
WBC # BLD AUTO: 21.5 K/UL (ref 4.6–13.2)

## 2017-09-12 PROCEDURE — 74011250636 HC RX REV CODE- 250/636: Performed by: INTERNAL MEDICINE

## 2017-09-12 PROCEDURE — 74011636637 HC RX REV CODE- 636/637: Performed by: INTERNAL MEDICINE

## 2017-09-12 PROCEDURE — 84132 ASSAY OF SERUM POTASSIUM: CPT | Performed by: INTERNAL MEDICINE

## 2017-09-12 PROCEDURE — 74011250637 HC RX REV CODE- 250/637: Performed by: INTERNAL MEDICINE

## 2017-09-12 PROCEDURE — 94760 N-INVAS EAR/PLS OXIMETRY 1: CPT

## 2017-09-12 PROCEDURE — 82962 GLUCOSE BLOOD TEST: CPT

## 2017-09-12 PROCEDURE — 97161 PT EVAL LOW COMPLEX 20 MIN: CPT

## 2017-09-12 PROCEDURE — 97116 GAIT TRAINING THERAPY: CPT

## 2017-09-12 PROCEDURE — 74011000250 HC RX REV CODE- 250: Performed by: INTERNAL MEDICINE

## 2017-09-12 PROCEDURE — 74011636637 HC RX REV CODE- 636/637: Performed by: HOSPITALIST

## 2017-09-12 PROCEDURE — 74011000258 HC RX REV CODE- 258: Performed by: INTERNAL MEDICINE

## 2017-09-12 PROCEDURE — 36415 COLL VENOUS BLD VENIPUNCTURE: CPT | Performed by: INTERNAL MEDICINE

## 2017-09-12 PROCEDURE — 94640 AIRWAY INHALATION TREATMENT: CPT

## 2017-09-12 PROCEDURE — 65660000000 HC RM CCU STEPDOWN

## 2017-09-12 PROCEDURE — 74011000258 HC RX REV CODE- 258: Performed by: HOSPITALIST

## 2017-09-12 PROCEDURE — 83605 ASSAY OF LACTIC ACID: CPT | Performed by: INTERNAL MEDICINE

## 2017-09-12 PROCEDURE — 83735 ASSAY OF MAGNESIUM: CPT | Performed by: INTERNAL MEDICINE

## 2017-09-12 PROCEDURE — 77010033678 HC OXYGEN DAILY

## 2017-09-12 PROCEDURE — 85025 COMPLETE CBC W/AUTO DIFF WBC: CPT | Performed by: INTERNAL MEDICINE

## 2017-09-12 RX ORDER — INSULIN GLARGINE 100 [IU]/ML
40 INJECTION, SOLUTION SUBCUTANEOUS DAILY
Status: DISCONTINUED | OUTPATIENT
Start: 2017-09-12 | End: 2017-09-13

## 2017-09-12 RX ORDER — DEXTROSE MONOHYDRATE AND SODIUM CHLORIDE 5; .45 G/100ML; G/100ML
25 INJECTION, SOLUTION INTRAVENOUS CONTINUOUS
Status: DISCONTINUED | OUTPATIENT
Start: 2017-09-12 | End: 2017-09-13

## 2017-09-12 RX ORDER — MIRTAZAPINE 15 MG/1
30 TABLET, ORALLY DISINTEGRATING ORAL
Status: DISCONTINUED | OUTPATIENT
Start: 2017-09-12 | End: 2017-09-20 | Stop reason: HOSPADM

## 2017-09-12 RX ORDER — INSULIN GLARGINE 100 [IU]/ML
50 INJECTION, SOLUTION SUBCUTANEOUS DAILY
Status: DISCONTINUED | OUTPATIENT
Start: 2017-09-12 | End: 2017-09-12

## 2017-09-12 RX ADMIN — MORPHINE SULFATE 1 MG: 2 INJECTION, SOLUTION INTRAMUSCULAR; INTRAVENOUS at 21:57

## 2017-09-12 RX ADMIN — AMLODIPINE BESYLATE 10 MG: 5 TABLET ORAL at 09:42

## 2017-09-12 RX ADMIN — FUROSEMIDE 60 MG: 10 INJECTION, SOLUTION INTRAMUSCULAR; INTRAVENOUS at 09:41

## 2017-09-12 RX ADMIN — IPRATROPIUM BROMIDE AND ALBUTEROL SULFATE 3 ML: .5; 3 SOLUTION RESPIRATORY (INHALATION) at 07:45

## 2017-09-12 RX ADMIN — INSULIN LISPRO 6 UNITS: 100 INJECTION, SOLUTION INTRAVENOUS; SUBCUTANEOUS at 06:30

## 2017-09-12 RX ADMIN — FUROSEMIDE 60 MG: 10 INJECTION, SOLUTION INTRAMUSCULAR; INTRAVENOUS at 21:56

## 2017-09-12 RX ADMIN — PANTOPRAZOLE SODIUM 40 MG: 40 TABLET, DELAYED RELEASE ORAL at 09:00

## 2017-09-12 RX ADMIN — INSULIN LISPRO 12 UNITS: 100 INJECTION, SOLUTION INTRAVENOUS; SUBCUTANEOUS at 12:19

## 2017-09-12 RX ADMIN — MIRTAZAPINE 30 MG: 15 TABLET, ORALLY DISINTEGRATING ORAL at 00:56

## 2017-09-12 RX ADMIN — METHYLPREDNISOLONE SODIUM SUCCINATE 40 MG: 40 INJECTION, POWDER, FOR SOLUTION INTRAMUSCULAR; INTRAVENOUS at 14:39

## 2017-09-12 RX ADMIN — VITAMIN D, TAB 1000IU (100/BT) 1000 UNITS: 25 TAB at 09:42

## 2017-09-12 RX ADMIN — ISOSORBIDE MONONITRATE 30 MG: 30 TABLET, EXTENDED RELEASE ORAL at 09:41

## 2017-09-12 RX ADMIN — MORPHINE SULFATE 1 MG: 2 INJECTION, SOLUTION INTRAMUSCULAR; INTRAVENOUS at 05:10

## 2017-09-12 RX ADMIN — INSULIN GLARGINE 40 UNITS: 100 INJECTION, SOLUTION SUBCUTANEOUS at 09:57

## 2017-09-12 RX ADMIN — INSULIN GLARGINE 40 UNITS: 100 INJECTION, SOLUTION SUBCUTANEOUS at 09:43

## 2017-09-12 RX ADMIN — IPRATROPIUM BROMIDE AND ALBUTEROL SULFATE 3 ML: .5; 3 SOLUTION RESPIRATORY (INHALATION) at 00:39

## 2017-09-12 RX ADMIN — CHLOROTHIAZIDE SODIUM 500 MG: 500 INJECTION, POWDER, LYOPHILIZED, FOR SOLUTION INTRAVENOUS at 14:39

## 2017-09-12 RX ADMIN — METOPROLOL TARTRATE 25 MG: 25 TABLET ORAL at 09:43

## 2017-09-12 RX ADMIN — METHYLPREDNISOLONE SODIUM SUCCINATE 40 MG: 40 INJECTION, POWDER, FOR SOLUTION INTRAMUSCULAR; INTRAVENOUS at 05:10

## 2017-09-12 RX ADMIN — IPRATROPIUM BROMIDE AND ALBUTEROL SULFATE 3 ML: .5; 3 SOLUTION RESPIRATORY (INHALATION) at 15:22

## 2017-09-12 RX ADMIN — ACETAMINOPHEN 650 MG: 325 TABLET ORAL at 06:34

## 2017-09-12 RX ADMIN — METHYLPREDNISOLONE SODIUM SUCCINATE 40 MG: 40 INJECTION, POWDER, FOR SOLUTION INTRAMUSCULAR; INTRAVENOUS at 21:56

## 2017-09-12 RX ADMIN — CLONIDINE HYDROCHLORIDE 0.2 MG: 0.1 TABLET ORAL at 09:42

## 2017-09-12 RX ADMIN — HEPARIN SODIUM 5000 UNITS: 5000 INJECTION, SOLUTION INTRAVENOUS; SUBCUTANEOUS at 21:56

## 2017-09-12 RX ADMIN — INSULIN LISPRO 3 UNITS: 100 INJECTION, SOLUTION INTRAVENOUS; SUBCUTANEOUS at 21:57

## 2017-09-12 RX ADMIN — PYRIDOXINE HCL TAB 50 MG 50 MG: 50 TAB at 09:42

## 2017-09-12 RX ADMIN — IPRATROPIUM BROMIDE AND ALBUTEROL SULFATE 3 ML: .5; 3 SOLUTION RESPIRATORY (INHALATION) at 11:19

## 2017-09-12 RX ADMIN — CLOPIDOGREL BISULFATE 75 MG: 75 TABLET ORAL at 09:41

## 2017-09-12 RX ADMIN — INSULIN LISPRO 6 UNITS: 100 INJECTION, SOLUTION INTRAVENOUS; SUBCUTANEOUS at 16:56

## 2017-09-12 RX ADMIN — HEPARIN SODIUM 5000 UNITS: 5000 INJECTION, SOLUTION INTRAVENOUS; SUBCUTANEOUS at 03:21

## 2017-09-12 RX ADMIN — Medication 400 MG: at 09:42

## 2017-09-12 RX ADMIN — ASPIRIN 81 MG: 81 TABLET, COATED ORAL at 09:41

## 2017-09-12 RX ADMIN — CLOTRIMAZOLE AND BETAMETHASONE DIPROPIONATE: 10; .5 CREAM TOPICAL at 09:00

## 2017-09-12 RX ADMIN — INSULIN LISPRO 10 UNITS: 100 INJECTION, SOLUTION INTRAVENOUS; SUBCUTANEOUS at 09:41

## 2017-09-12 RX ADMIN — LEVOTHYROXINE SODIUM 150 MCG: 150 TABLET ORAL at 09:42

## 2017-09-12 RX ADMIN — IPRATROPIUM BROMIDE AND ALBUTEROL SULFATE 3 ML: .5; 3 SOLUTION RESPIRATORY (INHALATION) at 05:21

## 2017-09-12 RX ADMIN — DEXTROSE MONOHYDRATE AND SODIUM CHLORIDE 25 ML/HR: 5; .45 INJECTION, SOLUTION INTRAVENOUS at 19:55

## 2017-09-12 RX ADMIN — HEPARIN SODIUM 5000 UNITS: 5000 INJECTION, SOLUTION INTRAVENOUS; SUBCUTANEOUS at 12:18

## 2017-09-12 NOTE — CONSULTS
Phone: [phone]  Paging : [phone]      Hematology / Oncology Consult Note    Admit Date: 9/10/2017    Assessment:     Principal Problem:    CHF (congestive heart failure) ([facility] (7/16/2016)    Active Problems:    Shortness of breath (5/3/2015)      PAM (acute kidney injury) ([facility] (7/17/2016)      DM (diabetes mellitus) ([facility] (7/17/2016)      CKD (chronic kidney disease) stage 4, GFR 15-29 ml/min ([facility]) (3/12/2017)      Hyperkalemia (9/10/2017)      Stridor (9/11/2017)      Localized cancer of lip, oral cavity and throat ([facility] (9/11/2017)      Overview: 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 On hold. Approx 3 weeks XRT remaining. 2) Acute on chronic Kidney Insufficiency- BUN/Cr 49/1. 99. Nephrology following. Diuresis. 3) CHF/HTN- Lasix, Diuril diuresis. Norvasc, Catapress, Lopressor, Imdur  4) Anxiety- Ativan prn. Considnote- also receiving Solucortef)er psych consultation. 5) Leukocytosis- UTI and receiving Solu-cortef. 6) UTI. Urine Cx pending. Empiric ABx. 6) Anemia- Hgb 10.4 g/dl. 7) DMT2- Humalog. 8) HLP- Pitavastatin  9) Hypothyroidism- Synthroid  10) Disposition- No planned transfer to [facility] given no addition therapies planned    CC: 79 yo female admitted with PAM/CHF/UTI with H&N cancer, asked to see pt by Dr. Obed Cavazos. HPI: 79 yo CF with H&N cancer, receiving palliative XRT by Dr. Shanelle Weinberg (Rad/Onc) and Dr Joe Martinez (Med/Onc). Resides at [facility]. C/O SOB worsening x 2 days, worsening Anxiety. In ED with PAM and hyperkalemia, + Urinary rention (600 ml).     Subjective:     Past Medical History:   Diagnosis Date    Anxiety      Arthritis      CAD (coronary artery disease)       Minor    Chest pain, unspecified 1/28/2011    Diabetes ([facility] 1964     Adult onset for 45 years    Essential hypertension      Hiatal hernia      Hyperlipidemia      Hypothyroidism      Systolic hypertension                 Past Surgical History:   Procedure Laterality Date    CARDIAC SURG PROCEDURE UNLIST         three cardiac stents     HX CAROTID ENDARTERECTOMY        HX CHOLECYSTECTOMY        HX HYSTERECTOMY        NM RADIONUCLIDE ABLATION THERAPY             History reviewed. No pertinent family history.     Social History            Social History    Marital status:        Spouse name: N/A    Number of children: N/A    Years of education: N/A            Social History Main Topics    Smoking status: Former Smoker       Packs/day: 1.00    Smokeless tobacco: Never Used    Alcohol use No    Drug use: No    Sexual activity: Not Asked           Other Topics Concern    None      Social History Narrative                 Prior to Admission medications    Medication Sig Start Date End Date Taking? Authorizing Provider   labetalol (NORMODYNE) 100 mg tablet Take 100 mg by mouth two (2) times a day.       Tram Jackman MD   furosemide (LASIX) 40 mg tablet Take 1 Tab by mouth daily for 20 days. 9/3/17 9/23/17   Guillermo Slade MD   nystatin-triamcinolone (MYCOLOG) 100,000-0.1 unit/gram-% ointment Apply  to affected area two (2) times a day. 8/1/17     Brenda Raines MD   metoprolol tartrate (LOPRESSOR) 50 mg tablet Take 0.5 Tabs by mouth two (2) times a day.  3/13/17     Salma Owusu MD   diphenhydrAMINE (BENADRYL) 25 mg tablet Take 25 mg by mouth every six (6) hours as needed for Sleep.       Tram Jackman MD   melatonin 3 mg tablet Take 3 mg by mouth.       Tram Jackman MD   miconazole (MICONAZOLE 7) 2 % vaginal cream Insert 1 Applicator into vagina nightly.       Tram Jackman MD   calcium carbonate (HODAN-GEST ANTACID) 200 mg calcium (500 mg) chew Take 2 Tabs by mouth three (3) times daily.       Tram Jackman MD   loperamide (IMMODIUM) 2 mg tablet Take 2 mg by mouth daily.       Tram Jackman MD   naphazoline-pheniramine (NAPHCON-A) 0.025-0.3 % ophthalmic solution Administer  to both eyes.       Tram Jackman MD   clotrimazole-betamethasone (LOTRISONE) 1-0.05 % lotion Apply  to affected area two (2) times a day.       Tram Jackman MD   acetaminophen (TYLENOL) 325 mg tablet Take 650 mg by mouth three (3) times daily as needed for Pain.       Tram Jackman MD   meclizine (ANTIVERT) 12.5 mg tablet Take 12.5 mg by mouth four (4) times daily as needed.       Tram Jackman MD   polyethylene glycol (MIRALAX) 17 gram/dose powder Take 17 g by mouth as needed.       Tram Jackman MD   traMADol (ULTRAM) 50 mg tablet Take 50 mg by mouth every six (6) hours as needed for Pain.       Tram Jackman MD   pitavastatin (LIVALO) 2 mg tablet Take 1 mg by mouth daily.       Tram Jackman MD   magnesium oxide (MAG-OX) 400 mg tablet Take 400 mg by mouth daily.       Tram Jackman MD   promethazine (PHENERGAN) 6.25 mg/5 mL syrup Take 6.25 mg by mouth four (4) times daily as needed for Nausea.       Tram Jackman MD   cloNIDine HCl (CATAPRES) 0.2 mg tablet Take 1 Tab by mouth two (2) times a day. Patient taking differently: Take 0.2 mg by mouth three (3) times daily. 5/7/15     Sina Carlos MD   LORazepam (ATIVAN) 0.5 mg tablet Take 1 Tab by mouth every eight (8) hours as needed for Anxiety. Max Daily Amount: 1.5 mg. 3/2/15     GARCIA Enriquez   amlodipine (NORVASC) 5 mg tablet Take 10 mg by mouth daily.       Historical Provider   aspirin 81 mg tablet Take 81 mg by mouth daily.       Historical Provider   cholecalciferol, vitamin d3, (VITAMIN D) 1,000 unit tablet Take 1,000 Units by mouth daily.       Historical Provider   levothyroxine (SYNTHROID) 125 mcg tablet Take 150 mcg by mouth Daily (before breakfast).        Historical Provider   pyridoxine (VITAMIN B-6) 50 mg tablet Take 50 mg by mouth daily.       Historical Provider   atorvastatin (LIPITOR) 20 mg tablet Take  by mouth daily.       Historical Provider   pantoprazole (PROTONIX) 40 mg tablet Take 40 mg by mouth daily.       Historical Provider   INSULIN ASPART (NOVOLOG SC) 12 Units by SubCUTAneous route three (3) times daily (with meals). Indications: breakfast and lunch 1/28/11     Historical Provider   clopidogrel (PLAVIX) 75 mg tablet Take 75 mg by mouth daily.       Historical Provider   isosorbide mononitrate ER (IMDUR) 30 mg tablet Take 30 mg by mouth two (2) times a day.       Historical Provider   nitroglycerin (NITROQUICK) 0.4 mg SL tablet 0.4 mg by SubLINGual route every five (5) minutes as needed.       Historical Provider   INSULIN GLARGINE,HUM. REC. ANLOG (LANTUS SC) 55 Units by SubCUTAneous route as directed. 1/28/11     Historical Provider         No Known Allergies        Review of Systems  GENERAL: Patient alert, awake and oriented times 3, able to communicate full sentences and not in distress. HEENT: No change in vision, no earache, tinnitus, sore throat or sinus congestion. NECK: No pain or stiffness. + Rad/Onc treatment markings  PULMONARY: +shortness of breath and wheezing. Cardiovascular: no pnd or orthopnea, no CP  GASTROINTESTINAL: + Difficulty swallowing, No abdominal pain, nausea, vomiting or diarrhea, melena or bright red blood per rectum. GENITOURINARY: No urinary frequency, urgency, hesitancy or dysuria. MUSCULOSKELETAL: No joint or muscle pain, no back pain, no recent trauma. DERMATOLOGIC: No rash, no itching, no lesions. ENDOCRINE: No polyuria, polydipsia, no heat or cold intolerance. No recent change in weight. HEMATOLOGICAL: No anemia or easy bruising or bleeding. NEUROLOGIC: No headache, seizures, numbness, tingling or weakness.        Objective:     Patient Vitals for the past 24 hrs:   BP Temp Pulse Resp SpO2 Weight   09/12/17 0521 - - - - 98 % -   09/12/17 0319 191/45 97.8 °F (36.6 °C) 81 21 98 % 86.2 kg (190 lb 0.6 oz)   17 0040 - - - - 97 % -   17 2254 173/52 98.1 °F (36.7 °C) 86 21 97 % -   17 - - - - 92 % -   17 1956 154/44 98.6 °F (37 °C) 73 21 92 % -   17 1625 - - - - 98 % -   17 1452 147/73 98.1 °F (36.7 °C) 76 19 98 % -   17 1207 - - - - 96 % -   17 1009 - - - - 97 % -   17 0807 160/49 98 °F (36.7 °C) 88 21 95 % -         Intake/Output Summary (Last 24 hours) at 17 0732  Last data filed at 17 0650   Gross per 24 hour   Intake              720 ml   Output             5650 ml   Net            -4930 ml       Temp (24hrs), Av.1 °F (36.7 °C), Min:97.8 °F (36.6 °C), Max:98.6 °F (37 °C)     General:  Alert, cooperative, mild  distress, appears stated age. Head: Normocephalic, without obvious abnormality, atraumatic. Eyes:  Conjunctivae/corneas clear. PERRL, EOMs intact. Nose: Nares normal. No drainage or sinus tenderness. audible stridor upper airway    Neck: Supple, radiation changes across mandible and neck with radiation markers . Lungs:   Clear to auscultation bilaterally. Heart:  Regular rate and rhythm, S1, S2 normal.     Abdomen: Obese, Soft, non-tender. Bowel sounds normal. anarsaca    Extremities: Extremities normal, atraumatic, no cyanosis plus 1-2+ pittting edema. Bilateral lower extremites    Pulses: 2+ and symmetric all extremities. Skin:  No rashes or lesions   Neurologic: AAOx3, No focal motor or sensory deficit     Imaging:       pCXR 9/10/2017-  New symmetrically increased interstitial infiltrates. Differential includes  acute onset interstitial pulmonary edema which is felt to be most likely. Acute  interstitial pneumonitis is felt to be less likely. V/Q Scan 2017-  Low probability for pulmonary embolism without definite focal segmental  perfusion defect.          ECHO 2017-  Left ventricle: Systolic function was normal. Ejection fraction was estimated   to be 55 % in the range of 50 % to 60 %.  Suboptimal endocardial visualization limits wall motion analysis. Mitral valve: There was mild to moderate annular calcification. Tricuspid valve: There was mild regurgitation. Pulmonary artery systolic   pressure was mildly increased. Pulmonary  artery systolic pressure: 30 mmHg.     Recent Results (from the past 24 hour(s))   CARDIAC PANEL,(CK, CKMB & TROPONIN)    Collection Time: 09/11/17 12:10 PM   Result Value Ref Range    CK 62 26 - 192 U/L    CK - MB 1.1 <3.6 ng/ml    CK-MB Index 1.8 0.0 - 4.0 %    Troponin-I, Qt. <0.02 0.00 - 0.06 NG/ML   POTASSIUM    Collection Time: 09/11/17 12:10 PM   Result Value Ref Range    Potassium 5.0 3.5 - 5.5 mmol/L   GLUCOSE, POC    Collection Time: 09/11/17  1:35 PM   Result Value Ref Range    Glucose (POC) 427 (HH) 70 - 110 mg/dL   GLUCOSE, POC    Collection Time: 09/11/17  4:51 PM   Result Value Ref Range    Glucose (POC) 350 (H) 70 - 110 mg/dL   CARDIAC PANEL,(CK, CKMB & TROPONIN)    Collection Time: 09/11/17  5:30 PM   Result Value Ref Range    CK 64 26 - 192 U/L    CK - MB 1.5 <3.6 ng/ml    CK-MB Index 2.3 0.0 - 4.0 %    Troponin-I, Qt. <0.02 0.00 - 0.06 NG/ML   POTASSIUM    Collection Time: 09/11/17  5:30 PM   Result Value Ref Range    Potassium 4.2 3.5 - 5.5 mmol/L   GLUCOSE, POC    Collection Time: 09/11/17  9:55 PM   Result Value Ref Range    Glucose (POC) 249 (H) 70 - 110 mg/dL   POTASSIUM    Collection Time: 09/12/17 12:40 AM   Result Value Ref Range    Potassium 4.2 3.5 - 5.5 mmol/L   LACTIC ACID    Collection Time: 09/12/17  5:46 AM   Result Value Ref Range    Lactic acid 1.2 0.4 - 2.0 MMOL/L   METABOLIC PANEL, COMPREHENSIVE    Collection Time: 09/12/17  5:46 AM   Result Value Ref Range    Sodium 130 (L) 136 - 145 mmol/L    Potassium 4.0 3.5 - 5.5 mmol/L    Chloride 91 (L) 100 - 108 mmol/L    CO2 28 21 - 32 mmol/L    Anion gap 11 3.0 - 18 mmol/L    Glucose 244 (H) 74 - 99 mg/dL    BUN 49 (H) 7.0 - 18 MG/DL    Creatinine 1.99 (H) 0.6 - 1.3 MG/DL BUN/Creatinine ratio 25 (H) 12 - 20      GFR est AA 29 (L) >60 ml/min/1.73m2    GFR est non-AA 24 (L) >60 ml/min/1.73m2    Calcium 9.8 8.5 - 10.1 MG/DL    Bilirubin, total 0.4 0.2 - 1.0 MG/DL    ALT (SGPT) 15 13 - 56 U/L    AST (SGOT) 11 (L) 15 - 37 U/L    Alk. phosphatase 85 45 - 117 U/L    Protein, total 7.2 6.4 - 8.2 g/dL    Albumin 3.0 (L) 3.4 - 5.0 g/dL    Globulin 4.2 (H) 2.0 - 4.0 g/dL    A-G Ratio 0.7 (L) 0.8 - 1.7     CBC WITH AUTOMATED DIFF    Collection Time: 09/12/17  5:46 AM   Result Value Ref Range    WBC 21.5 (H) 4.6 - 13.2 K/uL    RBC 4.26 4.20 - 5.30 M/uL    HGB 10.4 (L) 12.0 - 16.0 g/dL    HCT 30.5 (L) 35.0 - 45.0 %    MCV 71.6 (L) 74.0 - 97.0 FL    MCH 24.4 24.0 - 34.0 PG    MCHC 34.1 31.0 - 37.0 g/dL    RDW 18.0 (H) 11.6 - 14.5 %    PLATELET 151 (H) 624 - 420 K/uL    MPV 8.8 (L) 9.2 - 11.8 FL    NEUTROPHILS 93 (H) 42 - 75 %    BAND NEUTROPHILS 2 0 - 5 %    LYMPHOCYTES 1 (L) 20 - 51 %    MONOCYTES 2 2 - 9 %    EOSINOPHILS 0 0 - 5 %    BASOPHILS 0 0 - 3 %    METAMYELOCYTES 1 (H) 0 %    MYELOCYTES 1 (H) 0 %    ABS. NEUTROPHILS 20.0 (H) 1.8 - 8.0 K/UL    ABS. LYMPHOCYTES 0.2 (L) 0.8 - 3.5 K/UL    ABS. MONOCYTES 0.4 0 - 1.0 K/UL    ABS. EOSINOPHILS 0.0 0.0 - 0.4 K/UL    ABS.  BASOPHILS 0.0 0.0 - 0.1 K/UL    RBC COMMENTS ANISOCYTOSIS  1+        RBC COMMENTS POIKILOCYTOSIS  SLIGHT        DF MANUAL     MAGNESIUM    Collection Time: 09/12/17  5:46 AM   Result Value Ref Range    Magnesium 2.3 1.6 - 2.6 mg/dL   GLUCOSE, POC    Collection Time: 09/12/17  6:14 AM   Result Value Ref Range    Glucose (POC) 235 (H) 70 - 110 mg/dL       Taylor Tomlin MD

## 2017-09-12 NOTE — PROGRESS NOTES
0018-Report and care received, assessment completed per flow sheet. Alert, tearful, reports anxiety related to fears of returning to [facility]. States that she doesn't want to go back to [facility] but that she is afraid it will be financially impossible, case management consult placed per protocol. States \"I'm just so afraid that I'll kill myself. \" When asked if she feels that she would hurt herself now states \"No, I wouldn't really do it. \", SAD scale completed per flow sheet and rates at a 3 calling for every 30 minute observations- see flow sheet. Verbal reassurance provided. 65-  updated regarding anxiety, tearfulness, being afraid to return to [facility], and her verbal report of being afraid that she would kill herself. Also notified of not having a plan and stating that she wouldn't hurt herself at the current time. Orders received for Remeron. 46- Spoke with  about whether an order for suicide precautions should be placed. MD states since she does not have a plan and stated that she wouldn't really do it that it was not necessary. 0130- Resting quietly in bed with eyes closed, calm, NAD.    0220- C/o tingling in both feet, appears calmer. Both feet normal in color, capillary refill WNL, DP and PT pulses palpable, moves both feet equally, and sensation still present.  updated, orders received for a CMP, magnesium, and CBC for the morning. 0330- Reassessment completed and without change except not tearful. Reports abdominal soreness 5/10, declines pain medication. Reports that tingling in feet is still present and that her body occasionally jerks waking her up.  updated regarding status/body jerking. States that she will place a psych consult and to NOT discontinue the Remeron at this point.

## 2017-09-12 NOTE — PROGRESS NOTES
ALAINA Eldridge contacted SLP via telephone re: orders for CBSE on this date. Patient placed on NPO due to choking events and c/o difficulties swallowing at bedside to the RN. The patient's history is remarkable for head & neck cancer. Highly recommend MBSS before completion of PO trials as patient at risk for aspiration due to reduced oral motor function and impaired airwary protection r/t CA involvement of the vocal cords. SLP paged Dr. Asa Fischer.       5025  Spoke with ALAINA Eldridge and Patrick KIM re: need for MBSS. Education provided; however patient did not accept information re: aspiration risk and need for MBSS before PO trials. Patient exclaims it is \"normal to choke on liquids\" and \"I promise I won't try solid foods again\". Patient to remain NPO until MBSS.       Thank you for this referral,   Jennifer Torres M.S.Hay., CCC/SLP  Speech Language Pathologist

## 2017-09-12 NOTE — PROGRESS NOTES
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 Urine culture is pending  NA is better  PT needs Nepro 4 can per day   1200 fluid restriction in addition to 4 can of Nepro  Remove Rocha in am and voiding trial in am  PT/OT      CC: CHF, Hyperkalemia  Interval History: PT choked on her food today. Edema is better  Breathing better    Subjective:   No change since i saw pt last. No new symptoms or issues. Review of Systems:  Negative for chest pain      Blood pressure 172/60, pulse 91, temperature 97.9 °F (36.6 °C), resp. rate 18, height 5' (1.524 m), weight 86.2 kg (190 lb 0.6 oz), SpO2 96 %.       awake and alert   NAD  Edema is better      Intake/Output Summary (Last 24 hours) at 09/12/17 1025  Last data filed at 09/12/17 0650   Gross per 24 hour   Intake              720 ml   Output             4250 ml   Net            -3530 ml      Recent Labs      09/12/17   0546   WBC  21.5*     Lab Results   Component Value Date/Time    Sodium 130 09/12/2017 05:46 AM    Potassium 4.0 09/12/2017 05:46 AM    Chloride 91 09/12/2017 05:46 AM    CO2 28 09/12/2017 05:46 AM    Anion gap 11 09/12/2017 05:46 AM    Glucose 244 09/12/2017 05:46 AM    BUN 49 09/12/2017 05:46 AM    Creatinine 1.99 09/12/2017 05:46 AM    BUN/Creatinine ratio 25 09/12/2017 05:46 AM    GFR est AA 29 09/12/2017 05:46 AM    GFR est non-AA 24 09/12/2017 05:46 AM    Calcium 9.8 09/12/2017 05:46 AM        Current Facility-Administered Medications   Medication Dose Route Frequency Provider Last Rate Last Dose    mirtazapine (REMERON SOL-TAB) disintegrating tablet 30 mg  30 mg Oral QHS Jacob Jacobson MD   30 mg at 09/12/17 0056    insulin glargine (LANTUS) injection 40 Units  40 Units SubCUTAneous DAILY Bright Alexandre MD   40 Units at 09/12/17 0957    acetaminophen (TYLENOL) tablet 650 mg  650 mg Oral Q4H PRN Jacob Jacobson MD   650 mg at 09/12/17 0634    amLODIPine (NORVASC) tablet 10 mg  10 mg Oral DAILY Jacob Jacobson MD   10 mg at 09/12/17 1216    aspirin delayed-release tablet 81 mg  81 mg Oral DAILY Jacob Jacobson MD   81 mg at 09/12/17 0941    calcium carbonate (TUMS) chewable tablet 400 mg [elemental]  400 mg Oral TID Jacob Jacobson MD   400 mg at 09/11/17 2158    cholecalciferol (VITAMIN D3) tablet 1,000 Units  1,000 Units Oral DAILY Jacob Jacobson MD   1,000 Units at 09/12/17 3982    cloNIDine HCl (CATAPRES) tablet 0.2 mg  0.2 mg Oral BID Jacob Jacobson MD   0.2 mg at 09/12/17 3134    clopidogrel (PLAVIX) tablet 75 mg  75 mg Oral DAILY Jacob Jacobson MD   75 mg at 09/12/17 0941    clotrimazole-betamethasone (LOTRISONE) 1-0.05 % cream   Topical BID Jacob Jacobson MD        diphenhydrAMINE (BENADRYL) capsule 25 mg  25 mg Oral Q6H PRN Jacob Jacobson MD        isosorbide mononitrate ER (IMDUR) tablet 30 mg  30 mg Oral BID Jacob Jacobson MD   30 mg at 09/12/17 0941    levothyroxine (SYNTHROID) tablet 150 mcg  150 mcg Oral ACB Jacob Jacobson MD   150 mcg at 09/12/17 2163    LORazepam (ATIVAN) tablet 0.5 mg  0.5 mg Oral Q8H PRN Jacob Jacobson MD   0.5 mg at 09/11/17 2241    magnesium oxide (MAG-OX) tablet 400 mg  400 mg Oral DAILY Jacob Jacobson MD   400 mg at 09/12/17 0942    meclizine (ANTIVERT) tablet 12.5 mg  12.5 mg Oral Q6H PRN Keith Seo MD        metoprolol tartrate (LOPRESSOR) tablet 25 mg  25 mg Oral BID Keith Seo MD   25 mg at 09/12/17 0943    nitroglycerin (NITROSTAT) tablet 0.4 mg  0.4 mg SubLINGual PRN Keith Seo MD        pantoprazole (PROTONIX) tablet 40 mg  40 mg Oral DAILY Keith Seo MD   40 mg at 09/11/17 1218    pitavastatin calcium (LIVALO) tablet 1 mg  1 mg Oral DAILY Keith Seo MD   1 mg at 09/11/17 0900    polyethylene glycol (MIRALAX) packet 17 g  17 g Oral PRN Keith Seo MD        promethazine (PHENERGAN) 6.25 mg/5 mL syrup 6.25 mg  6.25 mg Oral QID PRN Keith Seo MD        pyridoxine (vitamin B6) (VITAMIN B-6) tablet 50 mg  50 mg Oral DAILY Keith Soe MD   50 mg at 09/12/17 0942    traMADol (ULTRAM) tablet 50 mg  50 mg Oral Q6H PRN Keith Seo MD        furosemide (LASIX) injection 60 mg  60 mg IntraVENous Q12H Keith Seo MD   60 mg at 09/12/17 0941    naloxone (NARCAN) injection 0.4 mg  0.4 mg IntraVENous PRN Keith Seo MD        heparin (porcine) injection 5,000 Units  5,000 Units SubCUTAneous Q8H Keith Seo MD   5,000 Units at 09/12/17 0321    insulin lispro (HUMALOG) injection   SubCUTAneous AC&HS Keith Seo MD   6 Units at 09/12/17 0630    glucose chewable tablet 16 g  4 Tab Oral PRN Keith Seo MD        glucagon (GLUCAGEN) injection 1 mg  1 mg IntraMUSCular PRN Keith Seo MD        dextrose (D50W) injection syrg 12.5-25 g  25-50 mL IntraVENous PRN Keith Seo MD        methylPREDNISolone (PF) (SOLU-MEDROL) injection 40 mg  40 mg IntraVENous Q8H Keith Seo MD   40 mg at 09/12/17 0510    albuterol-ipratropium (DUO-NEB) 2.5 MG-0.5 MG/3 ML  3 mL Nebulization Q4H RT Keith Seo MD   3 mL at 09/12/17 0745    morphine injection 1 mg  1 mg IntraVENous Q4H PRN Keith Seo MD   1 mg at 09/12/17 0510    melatonin tablet 5 mg  5 mg Oral QHS Dotty Rodriges MD   5 mg at 09/11/17 2156    chlorothiazide (DIURIL) 500 mg in 0.9% sodium chloride 50 mL IVPB  500 mg IntraVENous DAILY Anni Alex DO   500 mg at 09/11/17 1217    levoFLOXacin (LEVAQUIN) 500 mg in D5W IVPB  500 mg IntraVENous Q48H Anni Alex  mL/hr at 09/11/17 1219 500 mg at 09/11/17 1219    insulin lispro (HUMALOG) injection 10 Units  10 Units SubCUTAneous Arie Galicia MD   10 Units at 09/12/17 9158

## 2017-09-12 NOTE — ROUTINE PROCESS
Bedside and Verbal shift change report given to Tab Garcia RN (oncoming nurse) by Jess Adkins RN (offgoing nurse). Report included the following information SBAR and Kardex.

## 2017-09-12 NOTE — PROGRESS NOTES
2241- PRN Ativan administered for anxiety. Pt repositioned, encouraged to rest. Call bell within reach.   2330- Pt resting, no distress noted

## 2017-09-12 NOTE — PROGRESS NOTES
Patient was visited by SONJA. Volunteer followed up with patient and/or family and reported no needs to this . Chaplains will continue to follow and will provide pastoral care as needed or requested. [address], M.Div.   Board Certified   [phone] - Office

## 2017-09-12 NOTE — PROGRESS NOTES
D/c plan not finalized  Met with patient during IDR patient questioning about transfer to [facility]. Dr. Tori Raza will follow up.

## 2017-09-12 NOTE — PROGRESS NOTES
[facility] care of patient from Casandra Richards RN    1004 AM assessment completed, patient is alert and oriented x's 4, c/o back pain, pain medication not due. NSR on the monitor, lung fields are clear throughout on 2L NC with a nonproductive cough noted. Patient did experience complications near the end of medication administration. 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 Patient began to c/o feeling of eggs stuck in throat. Patient will be NPO and speech consult will be placed per protocol. Rocha to gravity draining clear yellow urine. Patient is currently receiving physical therapy session and is sitting up on side of bed, no s/s of any distress, will continue to monitor. 1227 Scheduled medications administered as ordered without any complications. Patient is currently resting quietly in bed, no s/s of any pain or distress, will continue to monitor. 1443 Scheduled IV medication administered as ordered without any complications. Patient is currently upset due to nutrition status being changed to NPO status due to potential aspiration this morning. This nurse have attempted to comfort the patient, but patient insists that she can eat without chewing. Theodora (speech) has been notified and stated that she will come as soon as available. Patient has received update, but is still unhappy, no s/s of any pain or distress, will continue to monitor.

## 2017-09-12 NOTE — PROGRESS NOTES
Problem: Mobility Impaired (Adult and Pediatric)  Goal: *Acute Goals and Plan of Care (Insert Text)  Physical Therapy Goals LT/ST  Initiated 9/12/2017 and to be accomplished within 3-5 day(s)  1. Patient will move from supine <> sit with S in prep for out of bed activity and change of position. 2. Patient will perform sit<> stand with S with LRAD in prep for transfers/ambulation. 3. Patient will transfer from bed <> chair with S with LRAD for time up in chair for completion of ADL activity. 4. Patient will ambulate 150 feet with LRAD/S for improved functional mobility/safe discharge. Outcome: Progressing Towards Goal  PHYSICAL THERAPY EVALUATION     Patient: Soumya Wright (56 y.o. female)  Date: 9/12/2017  Primary Diagnosis: CHF (congestive heart failure) ([facility])  Hyperkalemia  Heart failure ([facility] [address]        Precautions:  Fall      ASSESSMENT :  Based on the objective data described below, the patient presents with decrease independence in bed mobility, transfers, and gait. Pt reported prior to assessment that she was able to ambulate w/ RW independently w/ supervision form assisted living staff and reports that she was independent w/ ADLs prior to hospital admittance. Pt seen w/ supplemental O2, telemonitor, and tilley catheter. Pt is alert and oriented x 4 and willing to work w/ PT at this time. Pt reported pain in L foot 9/10, nurse present in the room to administer pain meds. Pt requires increase motivation to participate in treatment w/ required additional time to participate in activities and demonstrates high anxiety. Pt requires VCing for proper bed mobility task and transfer task to ensure pt's safety. Pt was able to stand and ambulate 5 ft towards the [facility]. Pt required VCing for proper gait sequencing w/ RW. Pt was left in bed after treatment session, call bell and tray in reach, nurse notified after treatment session.       Patient will benefit from skilled intervention to address the above impairments. Patients rehabilitation potential is considered to be Fair  Factors which may influence rehabilitation potential include:   [ ]         None noted  [ ]         Mental ability/status  [X]         Medical condition  [X]         Home/family situation and support systems  [X]         Safety awareness  [X]         Pain tolerance/management  [ ]         Other:        PLAN :  Recommendations and Planned Interventions:  [X]           Bed Mobility Training             [ ]    Neuromuscular Re-Education  [X]           Transfer Training                   [ ]    Orthotic/Prosthetic Training  [X]           Gait Training                          [ ]    Modalities  [X]           Therapeutic Exercises          [ ]    Edema Management/Control  [X]           Therapeutic Activities            [X]    Patient and Family Training/Education  [ ]           Other (comment):     Frequency/Duration: Patient will be followed by physical therapy once/twice daily to address goals. Discharge Recommendations: Abdelrahman Zelaya  Further Equipment Recommendations for Discharge: rolling walker       SUBJECTIVE:   Patient stated I can't do it.       OBJECTIVE DATA SUMMARY:       Past Medical History:   Diagnosis Date    Anxiety      Arthritis      CAD (coronary artery disease)       Minor    Chest pain, unspecified 1/28/2011    Diabetes ([address] 1964     Adult onset for 39 years    Essential hypertension      Hiatal hernia      Hyperlipidemia      Hypothyroidism      Systolic hypertension       Past Surgical History:   Procedure Laterality Date    CARDIAC SURG PROCEDURE UNLIST         three cardiac stents     HX CAROTID ENDARTERECTOMY        HX CHOLECYSTECTOMY        HX HYSTERECTOMY        NM RADIONUCLIDE ABLATION THERAPY         Barriers to Learning/Limitations: yes;  physical  Compensate with: Verbal Cues and Tactile Cues  Prior Level of Function/Home Situation:   Home Situation  Home Environment: Assisted living  Care Facility Name: [facility]  Living Alone: Yes  Support Systems: Assisted living, Child(shabnam) (Daughter)  Current DME Used/Available at Home: Grab bars, Raised toilet seat, Shower chair, Walker, rolling  Critical Behavior:  Neurologic State: Alert  Orientation Level: Oriented X4  Cognition: Follows commands  Psychosocial  Patient Behaviors: Anxious; Cooperative  Purposeful Interaction: Yes  Pt Identified Daily Priority: Clinical issues (comment)  Skin Condition/Temp: Dry;Warm  Skin Integrity: Other (comment)  Skin Integumentary  Skin Color: Appropriate for ethnicity  Skin Condition/Temp: Dry;Warm  Skin Integrity: Other (comment)  Turgor: Non-tenting  Hair Growth: Present  Varicosities: Absent  Strength:    Strength: Generally decreased, functional  Tone & Sensation:   Sensation: Intact  Range Of Motion:  AROM: Generally decreased, functional  Functional Mobility:  Bed Mobility:  Supine to Sit: Minimum assistance; Additional time (HOB elevated for assistance, VC)  Sit to Supine: Minimum assistance; Additional time (HOB elevated for assistance, VC)  Scooting: Minimum assistance; Additional time  Transfers:  Sit to Stand: Minimum assistance  Stand to Sit: Contact guard assistance;Minimum assistance  Balance:   Sitting: Intact  Standing: Intact; With support  Ambulation/Gait Training:  Distance (ft): 5 Feet (ft)  Assistive Device: Gait belt;Walker, rolling  Ambulation - Level of Assistance: Contact guard assistance;Minimal assistance  Gait Description (WDL): Exceptions to WDL  Gait Abnormalities: Decreased step clearance  Base of Support: Narrowed  Stance: Time  Speed/Rhona: Shuffled  Step Length: Left shortened;Right shortened  Therapeutic Exercises:   None  Pain:  Pain Scale 1: Numeric (0 - 10)  Pain Intensity 1: 0  Pain Location 1: Foot  Pain Orientation 1: Left  Pain Description 1: Aching  Pain Intervention(s) 1: Medication (see MAR)  Activity Tolerance:   Fair-  Please refer to the flowsheet for vital signs taken during this treatment. After treatment:   [ ]         Patient left in no apparent distress sitting up in chair  [X]         Patient left in no apparent distress in bed  [X]         Call bell left within reach  [X]         Nursing notified  [ ]         Caregiver present  [ ]         Bed alarm activated      COMMUNICATION/EDUCATION:   [X]         Fall prevention education was provided and the patient/caregiver indicated understanding. [X]         Patient/family have participated as able in goal setting and plan of care. [X]         Patient/family agree to work toward stated goals and plan of care. [ ]         Patient understands intent and goals of therapy, but is neutral about his/her participation. [ ]         Patient is unable to participate in goal setting and plan of care.      Thank you for this referral.  Justin Dash, PT   Time Calculation: 27 mins

## 2017-09-12 NOTE — PROGRESS NOTES
Hospitalist Progress Note-critical care note     Patient: Melinda Alamo MRN: [id number]  [facility]: [id number]    YOB: 1932  Age: 80 y.o. 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 9/2017             Anxiety ICD-10-CM: F41.9  ICD-9-CM: 300.00  9/11/2017 Unknown        Heart failure ([facility] ICD-10-CM: I50.9  ICD-9-CM: 428.9  9/11/2017 Unknown        Acute renal failure superimposed on stage 4 chronic kidney disease ([facility] ICD-10-CM: N17.9, N18.4  ICD-9-CM: 584.9, 585.4  9/11/2017 Unknown        Hyperkalemia ICD-10-CM: E87.5  ICD-9-CM: 276.7  9/10/2017 Unknown        CKD (chronic kidney disease) stage 4, GFR 15-29 ml/min (HCC) ICD-10-CM: N18.4  ICD-9-CM: 585.4  3/12/2017 Yes        PAM (acute kidney injury) ([facility] ICD-10-CM: N17.9  ICD-9-CM: 584.9  7/17/2016 Yes        DM (diabetes mellitus) ([facility] ICD-10-CM: E11.9  ICD-9-CM: 250.00  7/17/2016 Yes        * (Principal)CHF (congestive heart failure) (HCC) ICD-10-CM: I50.9  ICD-9-CM: 428.0  7/16/2016 Unknown        Shortness of breath ICD-10-CM: R06.02  ICD-9-CM: 786.05  5/3/2015 Yes            1. CHF (congestive heart failure) (HCC)   Ef wnl, diastolic dysfunction    Will continue diuretics   And monitor electrolytes         2 PAM (acute kidney injury) on ckd4   Cr mild improving,   Renal on board ,       3.  DM (diabetes mellitus) ([facility] [address] (7/17/2016)  Hyperglycemia,   Increase lantus and premeal insulin. ssi       4  Hyperkalemia (9/10/2017)   resolved. Will d/c K Q6hr    5. stridor   No airway problem    will continue iv steroid and abx     6. 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 6. Anxiety (9/11/2017)  Continue ativan prn         Subjective: Dr. Jose Silverio is my cancer doctor. Nurse : daughter want to talk     Review of systems:    General: No fevers or chills. Cardiovascular: No chest pain or pressure. No palpitations. Pulmonary:  shortness of breath is same    Gastrointestinal: No nausea, vomiting. Vital signs/Intake and Output:  Visit Vitals    /60 (BP 1 Location: Left arm, BP Patient Position: At rest;Supine; Head of bed elevated (Comment degrees))    Pulse 91    Temp 97.9 °F (36.6 °C)    Resp 18    Ht 5' (1.524 m)    Wt 86.2 kg (190 lb 0.6 oz)    SpO2 96%    BMI 37.11 kg/m2     Current Shift:     Last three shifts:  09/10 1901 - 09/12 0700  In: 720 [P.O.:720]  Out: 7650 [Urine:7650]    Physical Exam:  General: WD, WN. Alert, cooperative, no acute distress    HEENT: NC, swelling and enlarged  of chin   PERRLA, anicteric sclerae. Lungs: CTA Bilaterally. No Wheezing/Rhonchi/Rales. Heart:  Regular  rhythm,  No murmur, No Rubs, No Gallops  Abdomen: Soft, Non distended, Non tender.  +Bowel sounds,   Extremities: No c/c/e  Psych:   +anxious , no agitated. Neurologic:  No acute neurological deficit.              Labs: Results:       Chemistry Recent Labs      09/12/17   0546  09/12/17   0040  09/11/17   1730   09/11/17   0516   09/10/17   2126   GLU  244*   --    --    --   211*   --   154*   NA  130*   --    --    --   122*   --   126*   K  4.0  4.2  4.2   < >  6.3*   < >  6.5*   CL  91*   --    --    --   90*   --   92*   CO2  28   --    --    --   19*   --   24   BUN  49*   --    --    --   43*   --   42*   CREA  1.99*   --    --    --   2.03*   --   2.08*   CA  9.8   --    --    --   9.6   --   9.3   AGAP  11   --    --    --   13   --   10   BUCR  25*   -- --    --   21*   --   20   AP  85   --    --    --   91   --   90   TP  7.2   --    --    --   7.4   --   6.8   ALB  3.0*   --    --    --   3.2*   --   3.0*   GLOB  4.2*   --    --    --   4.2*   --   3.8   AGRAT  0.7*   --    --    --   0.8   --   0.8    < > = values in this interval not displayed. CBC w/Diff Recent Labs      09/12/17   0546  09/11/17   0516  09/10/17   2126   WBC  21.5*  15.1*  13.4*   RBC  4.26  3.98*  4.10*   HGB  10.4*  9.6*  9.8*   HCT  30.5*  28.6*  29.7*   PLT  514*  475*  477*   GRANS  93*   --   81*   LYMPH  1*   --   5*   EOS  0   --   1      Cardiac Enzymes Recent Labs      09/11/17   1730  09/11/17   1210   CPK  64  62   CKND1  2.3  1.8      Coagulation No results for input(s): PTP, INR, APTT in the last 72 hours. No lab exists for component: INREXT, INREXT    Lipid Panel Lab Results   Component Value Date/Time    Cholesterol, total 124 07/17/2016 02:21 AM    HDL Cholesterol 41 07/17/2016 02:21 AM    LDL, calculated 57.6 07/17/2016 02:21 AM    VLDL, calculated 25.4 07/17/2016 02:21 AM    Triglyceride 127 07/17/2016 02:21 AM    CHOL/HDL Ratio 3.0 07/17/2016 02:21 AM      BNP No results for input(s): BNPP in the last 72 hours.    Liver Enzymes Recent Labs      09/12/17   0546   TP  7.2   ALB  3.0*   AP  85   SGOT  11*      Thyroid Studies Lab Results   Component Value Date/Time    TSH 1.92 03/11/2017 04:41 AM        Procedures/imaging: see electronic medical records for all procedures/Xrays and details which were not copied into this note but were reviewed prior to creation of Oswald Costello MD

## 2017-09-13 ENCOUNTER — APPOINTMENT (OUTPATIENT)
Dept: GENERAL RADIOLOGY | Age: 82
DRG: 291 | End: 2017-09-13
Attending: HOSPITALIST
Payer: MEDICARE

## 2017-09-13 PROBLEM — R13.10 DYSPHAGIA: Status: ACTIVE | Noted: 2017-09-13

## 2017-09-13 LAB
ALBUMIN SERPL-MCNC: 3.2 G/DL (ref 3.4–5)
ANION GAP SERPL CALC-SCNC: 11 MMOL/L (ref 3–18)
BUN SERPL-MCNC: 57 MG/DL (ref 7–18)
BUN/CREAT SERPL: 31 (ref 12–20)
CALCIUM SERPL-MCNC: 10 MG/DL (ref 8.5–10.1)
CHLORIDE SERPL-SCNC: 91 MMOL/L (ref 100–108)
CO2 SERPL-SCNC: 29 MMOL/L (ref 21–32)
CREAT SERPL-MCNC: 1.83 MG/DL (ref 0.6–1.3)
GLUCOSE BLD STRIP.AUTO-MCNC: 161 MG/DL (ref 70–110)
GLUCOSE BLD STRIP.AUTO-MCNC: 221 MG/DL (ref 70–110)
GLUCOSE BLD STRIP.AUTO-MCNC: 229 MG/DL (ref 70–110)
GLUCOSE BLD STRIP.AUTO-MCNC: 289 MG/DL (ref 70–110)
GLUCOSE SERPL-MCNC: 229 MG/DL (ref 74–99)
PHOSPHATE SERPL-MCNC: 4.1 MG/DL (ref 2.5–4.9)
POTASSIUM SERPL-SCNC: 3.3 MMOL/L (ref 3.5–5.5)
SODIUM SERPL-SCNC: 131 MMOL/L (ref 136–145)

## 2017-09-13 PROCEDURE — 97116 GAIT TRAINING THERAPY: CPT

## 2017-09-13 PROCEDURE — 77010033678 HC OXYGEN DAILY

## 2017-09-13 PROCEDURE — 65660000000 HC RM CCU STEPDOWN

## 2017-09-13 PROCEDURE — 74011250637 HC RX REV CODE- 250/637: Performed by: INTERNAL MEDICINE

## 2017-09-13 PROCEDURE — 92611 MOTION FLUOROSCOPY/SWALLOW: CPT

## 2017-09-13 PROCEDURE — 74011250636 HC RX REV CODE- 250/636: Performed by: INTERNAL MEDICINE

## 2017-09-13 PROCEDURE — 74230 X-RAY XM SWLNG FUNCJ C+: CPT

## 2017-09-13 PROCEDURE — 92526 ORAL FUNCTION THERAPY: CPT

## 2017-09-13 PROCEDURE — 74011636637 HC RX REV CODE- 636/637: Performed by: HOSPITALIST

## 2017-09-13 PROCEDURE — 94640 AIRWAY INHALATION TREATMENT: CPT

## 2017-09-13 PROCEDURE — 74011000258 HC RX REV CODE- 258: Performed by: INTERNAL MEDICINE

## 2017-09-13 PROCEDURE — 94760 N-INVAS EAR/PLS OXIMETRY 1: CPT

## 2017-09-13 PROCEDURE — 74011000255 HC RX REV CODE- 255: Performed by: INTERNAL MEDICINE

## 2017-09-13 PROCEDURE — 74011636637 HC RX REV CODE- 636/637: Performed by: INTERNAL MEDICINE

## 2017-09-13 PROCEDURE — 36415 COLL VENOUS BLD VENIPUNCTURE: CPT | Performed by: INTERNAL MEDICINE

## 2017-09-13 PROCEDURE — 82962 GLUCOSE BLOOD TEST: CPT

## 2017-09-13 PROCEDURE — 74011000250 HC RX REV CODE- 250: Performed by: INTERNAL MEDICINE

## 2017-09-13 PROCEDURE — 97530 THERAPEUTIC ACTIVITIES: CPT

## 2017-09-13 PROCEDURE — 80069 RENAL FUNCTION PANEL: CPT | Performed by: INTERNAL MEDICINE

## 2017-09-13 RX ORDER — INSULIN GLARGINE 100 [IU]/ML
55 INJECTION, SOLUTION SUBCUTANEOUS DAILY
Status: DISCONTINUED | OUTPATIENT
Start: 2017-09-13 | End: 2017-09-15

## 2017-09-13 RX ORDER — PREDNISONE 20 MG/1
40 TABLET ORAL
Status: DISCONTINUED | OUTPATIENT
Start: 2017-09-14 | End: 2017-09-15

## 2017-09-13 RX ADMIN — MORPHINE SULFATE 1 MG: 2 INJECTION, SOLUTION INTRAMUSCULAR; INTRAVENOUS at 21:53

## 2017-09-13 RX ADMIN — HEPARIN SODIUM 5000 UNITS: 5000 INJECTION, SOLUTION INTRAVENOUS; SUBCUTANEOUS at 18:59

## 2017-09-13 RX ADMIN — CLOTRIMAZOLE AND BETAMETHASONE DIPROPIONATE: 10; .5 CREAM TOPICAL at 09:00

## 2017-09-13 RX ADMIN — INSULIN LISPRO 6 UNITS: 100 INJECTION, SOLUTION INTRAVENOUS; SUBCUTANEOUS at 06:45

## 2017-09-13 RX ADMIN — IPRATROPIUM BROMIDE AND ALBUTEROL SULFATE 3 ML: .5; 3 SOLUTION RESPIRATORY (INHALATION) at 15:22

## 2017-09-13 RX ADMIN — METOPROLOL TARTRATE 25 MG: 25 TABLET ORAL at 11:04

## 2017-09-13 RX ADMIN — METHYLPREDNISOLONE SODIUM SUCCINATE 40 MG: 40 INJECTION, POWDER, FOR SOLUTION INTRAMUSCULAR; INTRAVENOUS at 06:44

## 2017-09-13 RX ADMIN — IPRATROPIUM BROMIDE AND ALBUTEROL SULFATE 3 ML: .5; 3 SOLUTION RESPIRATORY (INHALATION) at 19:41

## 2017-09-13 RX ADMIN — FUROSEMIDE 60 MG: 10 INJECTION, SOLUTION INTRAMUSCULAR; INTRAVENOUS at 09:55

## 2017-09-13 RX ADMIN — FUROSEMIDE 60 MG: 10 INJECTION, SOLUTION INTRAMUSCULAR; INTRAVENOUS at 21:53

## 2017-09-13 RX ADMIN — INSULIN LISPRO 6 UNITS: 100 INJECTION, SOLUTION INTRAVENOUS; SUBCUTANEOUS at 21:53

## 2017-09-13 RX ADMIN — Medication 400 MG: at 12:04

## 2017-09-13 RX ADMIN — CLONIDINE HYDROCHLORIDE 0.2 MG: 0.1 TABLET ORAL at 11:04

## 2017-09-13 RX ADMIN — BARIUM SULFATE 700 MG: 700 TABLET ORAL at 10:37

## 2017-09-13 RX ADMIN — METHYLPREDNISOLONE SODIUM SUCCINATE 40 MG: 40 INJECTION, POWDER, FOR SOLUTION INTRAMUSCULAR; INTRAVENOUS at 14:00

## 2017-09-13 RX ADMIN — BARIUM SULFATE 15 ML: 400 PASTE ORAL at 10:37

## 2017-09-13 RX ADMIN — CLONIDINE HYDROCHLORIDE 0.2 MG: 0.1 TABLET ORAL at 21:48

## 2017-09-13 RX ADMIN — VITAMIN D, TAB 1000IU (100/BT) 1000 UNITS: 25 TAB at 12:06

## 2017-09-13 RX ADMIN — ANTACID TABLETS 400 MG: 500 TABLET, CHEWABLE ORAL at 12:04

## 2017-09-13 RX ADMIN — INSULIN LISPRO 10 UNITS: 100 INJECTION, SOLUTION INTRAVENOUS; SUBCUTANEOUS at 12:03

## 2017-09-13 RX ADMIN — MECLIZINE 12.5 MG: 12.5 TABLET ORAL at 17:20

## 2017-09-13 RX ADMIN — CLOPIDOGREL BISULFATE 75 MG: 75 TABLET ORAL at 12:06

## 2017-09-13 RX ADMIN — LEVOFLOXACIN 500 MG: 5 INJECTION, SOLUTION INTRAVENOUS at 11:03

## 2017-09-13 RX ADMIN — IPRATROPIUM BROMIDE AND ALBUTEROL SULFATE 3 ML: .5; 3 SOLUTION RESPIRATORY (INHALATION) at 02:13

## 2017-09-13 RX ADMIN — METOPROLOL TARTRATE 25 MG: 25 TABLET ORAL at 21:48

## 2017-09-13 RX ADMIN — ASPIRIN 81 MG: 81 TABLET, COATED ORAL at 12:04

## 2017-09-13 RX ADMIN — PANTOPRAZOLE SODIUM 40 MG: 40 TABLET, DELAYED RELEASE ORAL at 12:05

## 2017-09-13 RX ADMIN — BARIUM SULFATE 30 G: 960 POWDER, FOR SUSPENSION ORAL at 10:37

## 2017-09-13 RX ADMIN — INSULIN GLARGINE 55 UNITS: 100 INJECTION, SOLUTION SUBCUTANEOUS at 09:55

## 2017-09-13 RX ADMIN — MORPHINE SULFATE 1 MG: 2 INJECTION, SOLUTION INTRAMUSCULAR; INTRAVENOUS at 13:55

## 2017-09-13 RX ADMIN — ISOSORBIDE MONONITRATE 30 MG: 30 TABLET, EXTENDED RELEASE ORAL at 11:04

## 2017-09-13 RX ADMIN — PYRIDOXINE HCL TAB 50 MG 50 MG: 50 TAB at 12:04

## 2017-09-13 RX ADMIN — INSULIN LISPRO 3 UNITS: 100 INJECTION, SOLUTION INTRAVENOUS; SUBCUTANEOUS at 16:30

## 2017-09-13 RX ADMIN — CHLOROTHIAZIDE SODIUM 500 MG: 500 INJECTION, POWDER, LYOPHILIZED, FOR SOLUTION INTRAVENOUS at 11:10

## 2017-09-13 RX ADMIN — HEPARIN SODIUM 5000 UNITS: 5000 INJECTION, SOLUTION INTRAVENOUS; SUBCUTANEOUS at 11:10

## 2017-09-13 RX ADMIN — INSULIN LISPRO 9 UNITS: 100 INJECTION, SOLUTION INTRAVENOUS; SUBCUTANEOUS at 12:03

## 2017-09-13 RX ADMIN — AMLODIPINE BESYLATE 10 MG: 5 TABLET ORAL at 11:04

## 2017-09-13 RX ADMIN — IPRATROPIUM BROMIDE AND ALBUTEROL SULFATE 3 ML: .5; 3 SOLUTION RESPIRATORY (INHALATION) at 11:20

## 2017-09-13 RX ADMIN — MIRTAZAPINE 30 MG: 15 TABLET, ORALLY DISINTEGRATING ORAL at 21:48

## 2017-09-13 RX ADMIN — IPRATROPIUM BROMIDE AND ALBUTEROL SULFATE 3 ML: .5; 3 SOLUTION RESPIRATORY (INHALATION) at 07:18

## 2017-09-13 NOTE — ROUTINE PROCESS
Bedside and Verbal shift change report given to Maryjane Lucas RN (oncoming nurse) by Alexi Wynne RN (offgoing nurse). Report included the following information SBAR, Intake/Output, MAR, cardiac rhythm SR/ST and Recent Results.

## 2017-09-13 NOTE — PROGRESS NOTES
2320:  Bedside and Verbal shift change report received from Joe Eric RN (offgoing nurse) to Sameer Tillman RN (oncoming nurse). Report included the following information SBAR, Kardex, Intake/Output, MAR, Recent Results and Cardiac Rhythm SR 1 degree AVB. Pt resting in bed. Appears to be in no distress at this time. Call bell within reach. Zone phone number given to pt. Pt instructed to call zone phone or call bell if needed. Shift summary:  Pt utilized the call night copious times during the night to complain about being NPO.

## 2017-09-13 NOTE — PROGRESS NOTES
Problem: Dysphagia (Adult)  Goal: *Acute Goals and Plan of Care (Insert Text)  Recommendations:  Diet: Pureed Solids with Thin Liquids   Meds: 1 at a time in puree; crushed if appropriate  Aspiration Precautions  Oral Care TID  Other: breath hold, small sip, and swallow hard    Goals: Patient will:  1. Tolerate PO trials with 0 s/s overt distress in 4/5 trials  2. Utilize compensatory swallow strategies/maneuvers (decrease bite/sip, size/rate, alt. liq/sol) with min cues in 4/5 trials  3. Perform oral-motor/laryngeal exercises to increase oropharyngeal swallow function with min cues  4. Complete an objective swallow study (i.e., MBSS) to assess swallow integrity, r/o aspiration, and determine of safest LRD, min A - Goal met 9/13/2017  Outcome: Progressing Towards Goal  SPEECH LANGUAGE PATHOLOGY DYSPHAGIA TREATMENT     Patient: Ramona Cohen (36 y.o. female)  Date: 9/13/2017  Diagnosis: CHF (congestive heart failure) (HCC)  Hyperkalemia  Heart failure (HCC) Diastolic congestive heart failure (HCC)       Precautions: Aspiration. Fall      ASSESSMENT:  Moderate oral phase and mild/moderate pharyngeal phase dysphagia. Patient AOx2 during dysphagia therapy. Required max fading to Manjeet for compensatory strategy recall; utilized the strategy with 90% accuracy across trials at bedside. Swallowing precautions posted. Education re: MBS results, safe swallow guidelines, aspiration risk, and swallowing exercises provided. Progression toward goals:  [X]         Improving appropriately and progressing toward goals  [ ]         Improving slowly and progressing toward goals  [ ]         Not making progress toward goals and plan of care will be adjusted       PLAN:  Recommendations and Planned Interventions:  As above. Patient continues to benefit from skilled intervention to address the above impairments. Continue treatment per established plan of care.   Discharge Recommendations:  [facility] vs Long Term Care        SUBJECTIVE:   Patient stated I don't want it to go in my lungs. OBJECTIVE:   Cognitive and Communication Status:  Neurologic State: Alert  Orientation Level: Oriented X4  Cognition: Follows commands, Memory loss  Perception: Appears intact  Perseveration: Perseverates during conversation  Safety/Judgement: Insight into deficits  Dysphagia Treatment:  Oral Assessment:  Oral Assessment  Labial: No impairment  Dentition: Upper & lower dentures  Oral Hygiene: good  Lingual: Decreased rate, Decreased strength, Incoordinated  Velum: No impairment  Mandible: Restricted  Gag Reflex: Present  P.O. Trials:              Patient Position: 60HOB              Vocal quality prior to P.O.: Hoarse              Consistency Presented:  Thin liquid              How Presented: SLP-fed/presented, Cup/sip              How Much: 15              Bolus Acceptance: No impairment              Bolus Formation/Control: Impaired              Type of Impairment: Delayed              Propulsion: No impairment              Oral Residue: None              Initiation of Swallow: Delayed (# of seconds)              Laryngeal Elevation: Decreased              Aspiration Signs/Symptoms: Change vocal quality              Pharyngeal Phase Characteristics: Altered vocal quality              Effective Modifications: Small sips and bites, Supraglottic swallow, Other (comment) (modified supraglottic swallow; effortful swallow)                                               Oral Phase Severity: Moderate              Pharyngeal Phase Severity : Mild-moderate              PAIN:  Start of Tx: 0  End of Tx: 0      After treatment:   [ ]              Patient left in no apparent distress sitting up in chair  [X]              Patient left in no apparent distress in bed  [X]              Call bell left within reach  [X]              Nursing notified  [ ]              Family present  [ ]              Caregiver present  [ ]              Bed alarm activated         COMMUNICATION/EDUCATION:   [X]        Aspiration precautions; swallow safety; compensatory techniques  [X]        Ongoing education with the patient and staff  [X]        Posted safety precautions in patient's room.   [ ]         Oral-motor/laryngeal strengthening exercises        Larkin Aschoff V, SLP  Time Calculation: 30 mins

## 2017-09-13 NOTE — PROGRESS NOTES
conducted a Follow up consultation and Spiritual Assessment for Jaison Alfonso, who is a 80 y.o.,female. The  provided the following Interventions:  Continued the relationship of care and support. Listened empathically. Offered assurance of continued prayer on patients behalf. Chart reviewed. The following outcomes were achieved:  Patient expressed gratitude for pastoral care visit. Assessment:  There are no spiritual or Anabaptist issues which require intervention at this time. Plan:  Chaplains will continue to follow and will provide pastoral care on an as needed/requested basis.  recommends bedside caregivers page  on duty if patient shows signs of acute spiritual or emotional distress.        Sister Erick Gallagher [place], [address] [phone]

## 2017-09-13 NOTE — PROGRESS NOTES
Hospitalist Progress Note-critical care note     Patient: Soumya Wright MRN: [id number]  CSN: [id number]    YOB: 1932  Age: 80 y.o. 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 9/2017             Anxiety ICD-10-CM: F41.9  ICD-9-CM: 300.00  9/11/2017 Unknown        Heart failure ([facility] ICD-10-CM: I50.9  ICD-9-CM: 428.9  9/11/2017 Unknown        Acute renal failure superimposed on stage 4 chronic kidney disease ([facility] ICD-10-CM: N17.9, N18.4  ICD-9-CM: 584.9, 585.4  9/11/2017 Unknown        Hyperkalemia ICD-10-CM: E87.5  ICD-9-CM: 276.7  9/10/2017 Unknown        CKD (chronic kidney disease) stage 4, GFR 15-29 ml/min (HCC) ICD-10-CM: N18.4  ICD-9-CM: 585.4  3/12/2017 Yes        PAM (acute kidney injury) ([facility] ICD-10-CM: N17.9  ICD-9-CM: 584.9  7/17/2016 Yes        DM (diabetes mellitus) ([facility] ICD-10-CM: E11.9  ICD-9-CM: 250.00  7/17/2016 Yes        * (Principal)Diastolic congestive heart failure (HCC) ICD-10-CM: I50.30  ICD-9-CM: 428.30, 428.0  7/16/2016 Unknown        Shortness of breath ICD-10-CM: R06.02  ICD-9-CM: 786.05  5/3/2015 Yes            1. CHF (congestive heart failure) (HCC)   Ef wnl, diastolic dysfunction    Will continue diuretics , will switch to po on medication. And monitor electrolytes         2 PAM (acute kidney injury) on ckd4   Cr mild improving,   Renal on board , cleared to be d/c per renal       3. DM (diabetes mellitus) ([facility] (7/17/2016)  Will decrease steroid. Continue  lantus and premeal insulin. ssi       4  Hyperkalemia (9/10/2017)   resolved. 5. stridor   No airway problem    will continue iv steroid and abx -switch to po     6. 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 6. Anxiety (9/11/2017)  Continue ativan prn   Dysphagia ; dysohagia diet , swallow func video  test done       Subjective: you do not want me to eat    Nurse :no acute issue     Will d/c to rehab tomorrow if found bed   Will d/c tilley   Review of systems:    General: No fevers or chills. Cardiovascular: No chest pain or pressure. No palpitations. Pulmonary:  shortness of breath is better   Gastrointestinal: No nausea, vomiting. Vital signs/Intake and Output:  Visit Vitals    /61    Pulse 92    Temp 98 °F (36.7 °C)    Resp 20    Ht 5' (1.524 m)    Wt 82.5 kg (181 lb 14.1 oz)    SpO2 96%    BMI 35.52 kg/m2     Current Shift:  09/13 0701 - 09/13 1900  In: -   Out: 600 [Urine:600]  Last three shifts:  09/11 1901 - 09/13 0700  In: 924.6 [P.O.:840; I.V.:84.6]  Out: 5650 [Urine:5650]    Physical Exam:  General: WD, WN. Alert, cooperative, no acute distress    HEENT: NC, swelling and enlarged  of chin   PERRLA, anicteric sclerae. Lungs: CTA Bilaterally. No Wheezing/Rhonchi/Rales. Heart:  Regular  rhythm,  No murmur, No Rubs, No Gallops  Abdomen: Soft, Non distended, Non tender.  +Bowel sounds,   Extremities: No c/c/e  Psych:   No anxious , no agitated. Neurologic:  No acute neurological deficit.              Labs: Results:       Chemistry Recent Labs      09/13/17   0848  09/12/17   0546  09/12/17   0040   09/11/17   0516   09/10/17   2126   GLU  229*  244*   --    --   211*   --   154*   NA  131*  130*   --    --   122*   --   126*   K  3.3*  4.0  4.2   < >  6.3*   < >  6.5*   CL  91*  91*   --    --   90*   --   92*   CO2  29  28   --    --   19*   --   24   BUN  57*  49*   --    --   43*   --   42* CREA  1.83*  1.99*   --    --   2.03*   --   2.08*   CA  10.0  9.8   --    --   9.6   --   9.3   AGAP  11  11   --    --   13   --   10   BUCR  31*  25*   --    --   21*   --   20   AP   --   85   --    --   91   --   90   TP   --   7.2   --    --   7.4   --   6.8   ALB  3.2*  3.0*   --    --   3.2*   --   3.0*   GLOB   --   4.2*   --    --   4.2*   --   3.8   AGRAT   --   0.7*   --    --   0.8   --   0.8    < > = values in this interval not displayed. CBC w/Diff Recent Labs      09/12/17   0546  09/11/17   0516  09/10/17   2126   WBC  21.5*  15.1*  13.4*   RBC  4.26  3.98*  4.10*   HGB  10.4*  9.6*  9.8*   HCT  30.5*  28.6*  29.7*   PLT  514*  475*  477*   GRANS  93*   --   81*   LYMPH  1*   --   5*   EOS  0   --   1      Cardiac Enzymes Recent Labs      09/11/17   1730  09/11/17   1210   CPK  64  62   CKND1  2.3  1.8      Coagulation No results for input(s): PTP, INR, APTT in the last 72 hours. No lab exists for component: INREXT, INREXT    Lipid Panel Lab Results   Component Value Date/Time    Cholesterol, total 124 07/17/2016 02:21 AM    HDL Cholesterol 41 07/17/2016 02:21 AM    LDL, calculated 57.6 07/17/2016 02:21 AM    VLDL, calculated 25.4 07/17/2016 02:21 AM    Triglyceride 127 07/17/2016 02:21 AM    CHOL/HDL Ratio 3.0 07/17/2016 02:21 AM      BNP No results for input(s): BNPP in the last 72 hours.    Liver Enzymes Recent Labs      09/13/17   0848  09/12/17   0546   TP   --   7.2   ALB  3.2*  3.0*   AP   --   85   SGOT   --   11*      Thyroid Studies Lab Results   Component Value Date/Time    TSH 1.92 03/11/2017 04:41 AM        Procedures/imaging: see electronic medical records for all procedures/Xrays and details which were not copied into this note but were reviewed prior to creation of Julian Chen MD

## 2017-09-13 NOTE — PROGRESS NOTES
conducted a Follow up consultation and Spiritual Assessment for Rj Thomas, who is a 80 y.o.,female. Continued the relationship of care and support. Listened empathically. Offered prayer and assurance of continued prayer on patients behalf. Plan:  Chaplains will continue to follow and will provide pastoral care as needed or requested.  recommends bedside caregivers page the  on duty if patient shows signs of acute spiritual or emotional distress. Father CHARITY PerezDiv.   [address] [facility] - Office

## 2017-09-13 NOTE — PROGRESS NOTES
Problem: Falls - Risk of  Goal: *Absence of Falls  Document Sarita Fall Risk and appropriate interventions in the flowsheet.    Outcome: Progressing Towards Goal  Fall Risk Interventions:  Mobility Interventions: Patient to call before getting OOB, PT Consult for mobility concerns, PT Consult for assist device competence, Strengthening exercises (ROM-active/passive), Communicate number of staff needed for ambulation/transfer           Medication Interventions: Patient to call before getting OOB     Elimination Interventions: Call light in reach, Patient to call for help with toileting needs, Toileting schedule/hourly rounds

## 2017-09-13 NOTE — PROGRESS NOTES
Assumed care of pt. Pt is alert no sign of distress. Call light within reach. Bed in Lowest position. 1146: Pt went down for Barium swallow. Small bites. No straw hold breath and hard swallow.

## 2017-09-13 NOTE — PROGRESS NOTES
D/c plan: anticipate patient to return to [facility] when she is ready for d/c    Telephone call with patients daughter she would like for patient to return to [facility]. She states she has talked to [facility] and they told her patient could return there when ready for discharge as long as she is not a aspiration risk    Daughter states if she is d/c tomorrow. Call her  Silver Irizarry [phone] and let him know so he can transport patient back to [facility]    Care Management Interventions  PCP Verified by CM: Yes  [facility]  Current Support Network: [address] [place] [address]  Confirm Follow Up Transport: Family (was informed that patients son in law Silver Irizarry will transport her back to [facility] [phone].  please give him advance nottice when she is ready for d/c)  Plan discussed with Pt/Family/Caregiver: Yes  Freedom of Choice Offered: Yes  Discharge Location  Discharge Placement: Assisted Living

## 2017-09-13 NOTE — DIABETES MGMT
Diabetes Patient/Family Education Record    Factors That  May Influence Patients Ability  to Learn or  Comply With  Recommendations:    []   Language barrier    []   Cultural needs   []   Motivation    []   Cognitive limitation    []   Physical   []   Education    []   Physiological factors   []   Hearing/vision/speaking impairment   []   Christian beliefs    []   Financial factors   []  Other:   [x]  No factors identified at this time.      Person Instructed:   [x]   Patient   []   Family   []  Other     Preference for Learning:   [x]   Verbal   []   Written   []  Demonstration     Level of Comprehension & Competence:    []  Good                                      [x] Fair                                     []  Poor                             [x]  Needs Reinforcement   []  Teachback completed    Education Component:   [x]  Medication management, including how the impact of steroids on Glycemic Control; requiring medication adjustments   [x]  Nutritional management including the role of carbohydrate intake   []  Exercise   []  Signs, symptoms, and treatment of hyperglycemia and hypoglycemia   [] Treatment of hyperglycemia and hypoglycemia   []  Importance of blood glucose monitoring and how to obtain a blood glucose meter    []  Instruction on use of blood glucose meter   [x]  Discuss the importance of HbA1C monitoring and provide patient with  results   []  Sick day guidelines   []  Proper use and disposal of lancets, needles, syringes or insulin pens (if appropriate)   []  Potential long-term complications (retinopathy, kidney disease, neuropathy, heart disease, stroke, vascular disease, foot care)   [] Provide emergency contact number and contact number for more information    [x]  Goal:  Patient/family will demonstrate understanding of Diabetes Self Management Skills by: (date) 10/15  Plan for post-discharge education or self-management support:    [] Outpatient class schedule provided            [] Patient Declined    [] Scheduled for outpatient classes (date) _______         Santos Cotton RN, MS  Glycemic Control Team

## 2017-09-13 NOTE — PROGRESS NOTES
Problem: Dysphagia (Adult)  Goal: *Acute Goals and Plan of Care (Insert Text)  Recommendations:  Diet: Pureed Solids with Thin Liquids   Meds: 1 at a time in puree; crushed if appropriate  Aspiration Precautions  Oral Care TID  Other: breath hold, small sip, and swallow hard    Goals: Patient will:  1. Tolerate PO trials with 0 s/s overt distress in 4/5 trials  2. Utilize compensatory swallow strategies/maneuvers (decrease bite/sip, size/rate, alt. liq/sol) with min cues in 4/5 trials  3. Perform oral-motor/laryngeal exercises to increase oropharyngeal swallow function with min cues  4. Complete an objective swallow study (i.e., MBSS) to assess swallow integrity, r/o aspiration, and determine of safest LRD, min A - Goal met 9/13/2017  SPEECH PATHOLOGY MODIFIED BARIUM SWALLOW STUDY     Patient: Vito Ham (60 y.o. female)  Date: 9/13/2017  Primary Diagnosis: CHF (congestive heart failure) ([facility])  Hyperkalemia  Heart failure (HCC)        Precautions: Aspiration. Fall      ASSESSMENT :  MBS completed. Piotr aspiration and penetration visualized with thin liquids + straw and large sips of thin via cup; resolved with small cup sips, breath hold, and effortful swallow across 3 trials during MBS without event. Pharyngeal deficits include: delayed pharyngeal swallow initiation, reduced laryngeal elevation, and poor airway protection 2/2 oral/throat CA affecting the cords. Oral c/b decreased lingual strength and coordination. Anxious with solid trials; and, the patient refused solid trials when educated re: various textures. Premature spillage noted with pudding with good pharyngeal clearance. D/w the patient, Dr. Nohemy Arciniega, and RN Marnie Serra.      Pt presents with moderate oral phase and mild/moderate pharyngeal phase dysphagia, as evidenced above, which places pt at risk for aspiration. At this time, safest for pureed  solid, thin liquid diet.  SLP utilized video of study for visual feedback, education, and recommendations for pt/caregiver; verbalized comprehension. Patient will benefit from skilled intervention to address the above impairments. Patients rehabilitation potential is considered to be Good  Factors which may influence rehabilitation potential include:   [X]              Mental ability/status       PLAN :  Recommendations and Planned Interventions:  As above. Frequency/Duration: Patient will be followed by speech-language pathology 3-5 times a week to address goals. Discharge Recommendations: [facility] vs Long Term Care        SUBJECTIVE:   Patient stated I am feeling more comfortable now. OBJECTIVE:       Past Medical History:   Diagnosis Date    Anxiety      Arthritis      CAD (coronary artery disease)       Minor    Chest pain, unspecified 1/28/2011    Diabetes ([facility] 1964     Adult onset for 45 years    Essential hypertension      Hiatal hernia      Hyperlipidemia      Hypothyroidism      Systolic hypertension       Past Surgical History:   Procedure Laterality Date    CARDIAC SURG PROCEDURE UNLIST         three cardiac stents     HX CAROTID ENDARTERECTOMY        HX CHOLECYSTECTOMY        HX HYSTERECTOMY        NM RADIONUCLIDE ABLATION THERAPY         Prior Level of Function/Home Situation: Per pt/chart  Home Situation  Home Environment: Assisted living  [facility] [address] [facility]: [place]  Living Alone: Yes  Support Systems: Assisted living, Child(shabnam) (Daughter)  Current DME Used/Available at Home: Grab bars, Raised toilet seat, Shower chair, Walker, rolling  Diet prior to admission: pureed and thin   Current Diet:  Pureed and thin    Radiologist: HRRA  Film Views: Lateral;Fluoro  Patient Position: 90 in chair      Trial 1: Trial 2:   Consistency Presented:  Thin liquid;Pill/Tablet;Pudding Consistency Presented: Pudding   How Presented: SLP-fed/presented;Cup/sip;Straw;Successive swallows How Presented: Self-fed/presented;Spoon   Consistency Amount: 4 (25ml) Consistency Amount: 4 (20 ml)   Bolus Acceptance: No impairment Bolus Acceptance: No impairment   Bolus Formation/Control: Impaired: Delayed Bolus Formation/Control: Impaired: Delayed;Premature spillage   Propulsion: Discoordination;Delayed (# of seconds) Propulsion: Delayed (# of seconds)   Oral Residue: None Oral Residue: None   Initiation of Swallow: Triggered at vallecula Initiation of Swallow: Triggered at valleculae   Timing: Pooling 1-5 sec Timing: Pooling 1-5 sec   Penetration: Flash/transient; To cords;During swallow Penetration: None   Aspiration/Timing: During;From initial swallow Aspiration/Timing: No evidence of aspiration   Pharyngeal Clearance: No residue Pharyngeal Clearance: No residue   Attempted Modifications: Supraglottic swallow;Small sips and bites;Effortful swallow; Other (comment) (modified supraglottic swallow - no cough and reswallow) Attempted Modifications: Small sips and bites   Effective Modifications: Supraglottic swallow;Small sips and bites; Double swallow Effective Modifications: None   Cues for Modifications: Moderate Cues for Modifications: None   Comments: head and neck cancer; unable to palpate hyolaryngeal elevation         Trial 3: Trial 4:   Consistency Presented: Pill/Tablet;Pudding     How Presented: Self-fed/presented;Spoon     Consistency Amount: 1 (13mm Ba+tablet)     Bolus Acceptance: No impairment     Bolus Formation/Control: Impaired: Delayed;Premature spillage  :     Propulsion: Discoordination     Oral Residue: None     Initiation of Swallow: Triggered at valleculae     Timing: Pooling 1-5 sec     Penetration: None     Aspiration/Timing: No evidence of aspiration     Pharyngeal Clearance: No residue     Attempted Modifications: Alternate liquids/solids; Other (comment) (puree wash)     Effective Modifications: Alternate liquids/solids; Other (comment) (puree wash )     Cues for Modifications: None     Comments: pills one at a time in puree; globus sensation         Decreased Tongue Base Retraction?: Yes  Laryngeal Elevation: Incomplete laryngeal closure; Reduced excursion with laryngeal vestibule gap; Inadequate epiglottic inversion  Aspiration/Penetration Score: 7 (Aspiration-Contrast passes below the cords/, but is not ejected despite attempt)  Pharyngeal Symmetry: Not assessed  Pharyngeal-Esophageal Segment: Other (comment) (globus sensation at neck and chest area)  Pharyngeal Dysfunction: Decreased elevation/closure;Decreased tongue base retraction;Decreased pharyngeal wall constriction;Decreased strength     Oral Phase Severity: Moderate  Pharyngeal Phase Severity: Mild moderate     GCODESwallowing:  Swallow Current Status CK= 40-59%   Swallow Goal Status CK= 40-59%     The severity rating is based on the following outcomes:  EMERITA Noms Swallow Level 4              8-point Penetration-Aspiration Scale: Score 7  Clinical Judgement     PAIN:  Start of Study: 0  End of Study: 0       COMMUNICATION/EDUCATION:   [X]  Aspiration risks/precautions; compensatory swallow techniques  [X]  Patient/family have participated as able in goal setting and plan of care. [X]  Patient/family agree to work toward stated goals and plan of care. [ ]  Patient understands intent and goals of therapy, but is neutral about his/her participation. [ ]  Patient is unable to participate in goal setting and plan of care.      Thank you for this referral.  Cuco Huffman, SLP  Time Calculation: 30 mins
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
## 2017-09-13 NOTE — PROGRESS NOTES
Phone: [phone]  Paging : [phone]      Hematology / Oncology Progress Note    Admit Date: 9/10/2017    Assessment:     Principal Problem:    Diastolic congestive heart failure ([facility] (7/16/2016)    Active Problems:    Shortness of breath (5/3/2015)      PAM (acute kidney injury) ([facility] (7/17/2016)      DM (diabetes mellitus) ([facility] (7/17/2016)      CKD (chronic kidney disease) stage 4, GFR 15-29 ml/min ([facility]) (3/12/2017)      Hyperkalemia (9/10/2017)      Stridor (9/11/2017)      Localized cancer of lip, oral cavity and throat ([facility] (9/11/2017)      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 99. Nephrology following. Diuresis. 2) CHF/HTN/Stridor- Solu-cortef, Lasix, Diuril diuresis. Norvasc, Catapress, Lopressor, Imdur  3) Anxiety- Ativan prn. Consider psych consultation. 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 On hold. Approx 3 weeks XRT remaining (Compliance issues per Dr. Kike Laurent). 5) Leukocytosis- UTI and receiving Solu-cortef. 6) UTI. Urine Cx pending. Empiric ABx. 6) Anemia- Hgb 10.4 g/dl. 7) DMT2- Humalog. 8) HLP- Pitavastatin  9) Hypothyroidism- Synthroid  10) PTx/OTx  11) Disposition- No planned transfer to [facility] given no addition therapies planned       Review of Systems  GENERAL: Patient alert, awake and oriented times 3, able to communicate full sentences and not in distress. Anxious. HEENT: No change in vision, no earache, tinnitus, sore throat or sinus congestion. NECK: No pain or stiffness. + Rad/Onc treatment markings  PULMONARY: +shortness of breath and wheezing.    Cardiovascular: no pnd or orthopnea, no CP  GASTROINTESTINAL: + Difficulty swallowing, No abdominal pain, nausea, vomiting or diarrhea, melena or bright red blood per rectum. GENITOURINARY: No urinary frequency, urgency, hesitancy or dysuria. MUSCULOSKELETAL: No joint or muscle pain, no back pain, no recent trauma. DERMATOLOGIC: No rash, no itching, no lesions. ENDOCRINE: No polyuria, polydipsia, no heat or cold intolerance. No recent change in weight. HEMATOLOGICAL: No anemia or easy bruising or bleeding. NEUROLOGIC: No headache, seizures, numbness, tingling or weakness. Objective:     Visit Vitals    /49 (BP 1 Location: Left arm, BP Patient Position: At rest;Head of bed elevated (Comment degrees); Supine)    Pulse 95    Temp 98.1 °F (36.7 °C)    Resp 21    Ht 5' (1.524 m)    Wt 82.5 kg (181 lb 14.1 oz)    SpO2 98%    BMI 35.52 kg/m2   . Intake/Output Summary (Last 24 hours) at 17 0713  Last data filed at 17 0209   Gross per 24 hour   Intake           204.58 ml   Output             2250 ml   Net         -2045.42 ml       Temp (24hrs), Av.9 °F (36.6 °C), Min:97.7 °F (36.5 °C), Max:98.1 °F (36.7 °C)     General:  Alert, cooperative, mild  distress, appears stated age. Head: Normocephalic, without obvious abnormality, atraumatic. Eyes:  Conjunctivae/corneas clear. PERRL, EOMs intact. Nose: Nares normal. No drainage or sinus tenderness. audible stridor upper airway    Neck: Supple, radiation changes across mandible and neck with radiation markers, Large submental mass 5 cm. .   Lungs:   Clear to auscultation bilaterally. Heart:  Regular rate and rhythm, S1, S2 normal.     Abdomen: Obese, Soft, non-tender. Bowel sounds normal. anarsaca    Extremities: Extremities normal, atraumatic, no cyanosis plus 1-2+ pittting edema. Bilateral lower extremites    Pulses: 2+ and symmetric all extremities.    Skin:  No rashes or lesions   Neurologic: AAOx3, No focal motor or sensory deficit     Imaging:       [facility] 9/10/2017-  [facility] symmetrically increased interstitial infiltrates. Differential includes  acute onset interstitial pulmonary edema which is felt to be most likely. Acute  interstitial pneumonitis is felt to be less likely. V/Q Scan 9/11/2017-  Low probability for pulmonary embolism without definite focal segmental  perfusion defect. ECHO 9/11/2017-  Left ventricle: Systolic function was normal. Ejection fraction was estimated   to be 55 % in the range of 50 % to 60 %. Suboptimal endocardial visualization limits wall motion analysis. Mitral valve: There was mild to moderate annular calcification. Tricuspid valve: There was mild regurgitation. Pulmonary artery systolic   pressure was mildly increased. Pulmonary  artery systolic pressure: 30 mmHg.     Recent Results (from the past 24 hour(s))   GLUCOSE, POC    Collection Time: 09/12/17 11:42 AM   Result Value Ref Range    Glucose (POC) 311 (H) 70 - 110 mg/dL   GLUCOSE, POC    Collection Time: 09/12/17  4:27 PM   Result Value Ref Range    Glucose (POC) 214 (H) 70 - 110 mg/dL   GLUCOSE, POC    Collection Time: 09/12/17  9:35 PM   Result Value Ref Range    Glucose (POC) 198 (H) 70 - 110 mg/dL   GLUCOSE, POC    Collection Time: 09/13/17  6:16 AM   Result Value Ref Range    Glucose (POC) 221 (H) 70 - 110 mg/dL       Current Facility-Administered Medications:     mirtazapine (REMERON SOL-TAB) disintegrating tablet 30 mg, 30 mg, Oral, QHS, Nakia Diaz MD, Stopped at 09/12/17 2200    insulin glargine (LANTUS) injection 40 Units, 40 Units, SubCUTAneous, DAILY, Shayan Henry MD, 40 Units at 09/12/17 0957    dextrose 5 % - 0.45% NaCl infusion, 25 mL/hr, IntraVENous, CONTINUOUS, Shayan Henry MD, Last Rate: 25 mL/hr at 09/12/17 1955, 25 mL/hr at 09/12/17 1955    acetaminophen (TYLENOL) tablet 650 mg, 650 mg, Oral, Q4H PRN, Nakia Diaz MD, 650 mg at 09/12/17 0634    amLODIPine (NORVASC) tablet 10 mg, 10 mg, Oral, DAILY, Nakia Diaz MD, 10 mg at 09/12/17 7994    aspirin delayed-release tablet 81 mg, 81 mg, Oral, DAILY, Krissy Evangelista MD, 81 mg at 09/12/17 0941    calcium carbonate (TUMS) chewable tablet 400 mg [elemental], 400 mg, Oral, TID, Krissy Evangelista MD, Stopped at 09/12/17 2200    cholecalciferol (VITAMIN D3) tablet 1,000 Units, 1,000 Units, Oral, DAILY, Krissy Evangelista MD, 1,000 Units at 09/12/17 9160    cloNIDine HCl (CATAPRES) tablet 0.2 mg, 0.2 mg, Oral, BID, Krissy Evangelista MD, Stopped at 09/12/17 2100    clopidogrel (PLAVIX) tablet 75 mg, 75 mg, Oral, DAILY, Krissy Evangelista MD, 75 mg at 09/12/17 0941    clotrimazole-betamethasone (LOTRISONE) 1-0.05 % cream, , Topical, BID, Krissy Evangelista MD    diphenhydrAMINE (BENADRYL) capsule 25 mg, 25 mg, Oral, Q6H PRN, Krissy Evangelista MD    isosorbide mononitrate ER (IMDUR) tablet 30 mg, 30 mg, Oral, BID, Krissy Evangelista MD, Stopped at 09/12/17 2100    levothyroxine (SYNTHROID) tablet 150 mcg, 150 mcg, Oral, ACB, Krissy Evangelista MD, Stopped at 09/13/17 0645    LORazepam (ATIVAN) tablet 0.5 mg, 0.5 mg, Oral, Q8H PRN, Krissy Evangelista MD, 0.5 mg at 09/11/17 2241    magnesium oxide (MAG-OX) tablet 400 mg, 400 mg, Oral, DAILY, Krissy Evangelista MD, 400 mg at 09/12/17 9108    meclizine (ANTIVERT) tablet 12.5 mg, 12.5 mg, Oral, Q6H PRN, Krissy Evangelista MD    metoprolol tartrate (LOPRESSOR) tablet 25 mg, 25 mg, Oral, BID, Krissy Evangelista MD, Stopped at 09/12/17 2100    nitroglycerin (NITROSTAT) tablet 0.4 mg, 0.4 mg, SubLINGual, PRN, Krissy Evangelista MD    pantoprazole (PROTONIX) tablet 40 mg, 40 mg, Oral, DAILY, Krissy Evangelista MD, 40 mg at 09/12/17 0900    pitavastatin calcium (LIVALO) tablet 1 mg, 1 mg, Oral, DAILY, Krissy Evangelista MD, 1 mg at 09/12/17 0900    polyethylene glycol (MIRALAX) packet 17 g, 17 g, Oral, PRN, Krissy Evangelista MD    promethazine (PHENERGAN) 6.25 mg/5 mL syrup 6.25 mg, 6.25 mg, Oral, QID PRN, Edwin Michel MD    pyridoxine (vitamin B6) (VITAMIN B-6) tablet 50 mg, 50 mg, Oral, DAILY, Edwin Michel MD, 50 mg at 09/12/17 0942    traMADol (ULTRAM) tablet 50 mg, 50 mg, Oral, Q6H PRN, Edwin Michel MD    furosemide (LASIX) injection 60 mg, 60 mg, IntraVENous, Q12H, Edwin Michel MD, 60 mg at 09/12/17 2156    naloxone (NARCAN) injection 0.4 mg, 0.4 mg, IntraVENous, PRN, Edwin Michel MD    heparin (porcine) injection 5,000 Units, 5,000 Units, SubCUTAneous, Q8H, Edwin Michel MD, 5,000 Units at 09/12/17 2156    insulin lispro (HUMALOG) injection, , SubCUTAneous, AC&HS, Edwin Michel MD, 6 Units at 09/13/17 0645    glucose chewable tablet 16 g, 4 Tab, Oral, PRN, Edwin Michel MD    glucagon (GLUCAGEN) injection 1 mg, 1 mg, IntraMUSCular, PRN, Edwin Michel MD    dextrose (D50W) injection syrg 12.5-25 g, 25-50 mL, IntraVENous, PRN, Edwin Michel MD    methylPREDNISolone (PF) (SOLU-MEDROL) injection 40 mg, 40 mg, IntraVENous, Q8H, Edwin Michel MD, 40 mg at 09/13/17 0644    albuterol-ipratropium (DUO-NEB) 2.5 MG-0.5 MG/3 ML, 3 mL, Nebulization, Q4H RT, Edwin Michel MD, 3 mL at 09/13/17 0213    morphine injection 1 mg, 1 mg, IntraVENous, Q4H PRN, Edwin Michel MD, 1 mg at 09/12/17 2157    melatonin tablet 5 mg, 5 mg, Oral, QHS, Edwin Michel MD, Stopped at 09/12/17 2200    chlorothiazide (DIURIL) 500 mg in 0.9% sodium chloride 50 mL IVPB, 500 mg, IntraVENous, DAILY, Anni Alex DO, 500 mg at 09/12/17 1439    levoFLOXacin (LEVAQUIN) 500 mg in D5W IVPB, 500 mg, IntraVENous, Q48H, Anni Alex DO, Last Rate: 100 mL/hr at 09/11/17 1219, 500 mg at 09/11/17 1219    insulin lispro (HUMALOG) injection 10 Units, 10 Units, SubCUTAneous, TIDAC, Tommy Fried MD, Stopped at 09/12/17 [facility], [place]

## 2017-09-13 NOTE — PROGRESS NOTES
Problem: Falls - Risk of  Goal: *Absence of Falls  Document Sarita Fall Risk and appropriate interventions in the flowsheet.    Outcome: Progressing Towards Goal  Fall Risk Interventions:  Mobility Interventions: Bed/chair exit alarm           Medication Interventions: Bed/chair exit alarm     Elimination Interventions: Bed/chair exit alarm

## 2017-09-13 NOTE — PROGRESS NOTES
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 Edema is better  Breathing better    Subjective:   PT does not have any somatic complaints        Review of Systems  Negative for Edema, Chest pain, Tremors, Nausea, vomiting  Has difficult swallowing      Blood pressure (!) 194/91, pulse (!) 103, temperature 98.2 °F (36.8 °C), resp. rate 20, height 5' (1.524 m), weight 82.5 kg (181 lb 14.1 oz), SpO2 96 %.     awake and alert   NAD  Edema is better    Intake/Output Summary (Last 24 hours) at 09/13/17 0950  Last data filed at 09/13/17 0209   Gross per 24 hour   Intake            84.58 ml   Output             2250 ml   Net         -2165.42 ml      Recent Labs      09/12/17   0546   WBC  21.5*     Lab Results   Component Value Date/Time    Sodium 130 09/12/2017 05:46 AM    Potassium 4.0 09/12/2017 05:46 AM    Chloride 91 09/12/2017 05:46 AM    CO2 28 09/12/2017 05:46 AM    Anion gap 11 09/12/2017 05:46 AM    Glucose 244 09/12/2017 05:46 AM    BUN 49 09/12/2017 05:46 AM    Creatinine 1.99 09/12/2017 05:46 AM    BUN/Creatinine ratio 25 09/12/2017 05:46 AM    GFR est AA 29 09/12/2017 05:46 AM    GFR est non-AA 24 09/12/2017 05:46 AM    Calcium 9.8 09/12/2017 05:46 AM        Current Facility-Administered Medications   Medication Dose Route Frequency Provider Last Rate Last Dose    insulin glargine (LANTUS) injection 55 Units  55 Units SubCUTAneous DAILY Erika Mcbride MD        mirtazapine (REMERON SOL-TAB) disintegrating tablet 30 mg  30 mg Oral QHS Patti Gordillo MD   Stopped at 09/12/17 2200    dextrose 5 % - 0.45% NaCl infusion  25 mL/hr IntraVENous CONTINUOUS Erika Mcbride MD 25 mL/hr at 09/12/17 1955 25 mL/hr at 09/12/17 1955    acetaminophen (TYLENOL) tablet 650 mg  650 mg Oral Q4H PRN Patti Gordillo MD   650 mg at 09/12/17 0634    amLODIPine (NORVASC) tablet 10 mg  10 mg Oral DAILY Patti Gordillo MD   10 mg at 09/12/17 9034    aspirin delayed-release tablet 81 mg  81 mg Oral DAILY Patti Gordillo MD   81 mg at 09/12/17 0941    calcium carbonate (TUMS) chewable tablet 400 mg [elemental]  400 mg Oral TID Patti Gordillo MD   Stopped at 09/12/17 2200    cholecalciferol (VITAMIN D3) tablet 1,000 Units  1,000 Units Oral DAILY Patti Gordillo MD   1,000 Units at 09/12/17 2897    cloNIDine HCl (CATAPRES) tablet 0.2 mg  0.2 mg Oral BID Patti Gordillo MD   Stopped at 09/12/17 2100    clopidogrel (PLAVIX) tablet 75 mg  75 mg Oral DAILY Patti Gordillo MD   75 mg at 09/12/17 0941    clotrimazole-betamethasone (LOTRISONE) 1-0.05 % cream   Topical BID Patti Gordillo MD        diphenhydrAMINE (BENADRYL) capsule 25 mg  25 mg Oral Q6H PRN Patti Goridllo MD        isosorbide mononitrate ER (IMDUR) tablet 30 mg  30 mg Oral BID Patti Gordillo MD   Stopped at 09/12/17 2100    levothyroxine (SYNTHROID) tablet 150 mcg  150 mcg Oral ACB Patti Gordillo MD   Stopped at 09/13/17 0645    LORazepam (ATIVAN) tablet 0.5 mg  0.5 mg Oral Q8H PRN Patti Gordillo MD   0.5 mg at 09/11/17 9907    magnesium oxide (MAG-OX) tablet 400 mg  400 mg Oral DAILY Tyler Paris MD   400 mg at 09/12/17 7391    meclizine (ANTIVERT) tablet 12.5 mg  12.5 mg Oral Q6H PRN Tyler Paris MD        metoprolol tartrate (LOPRESSOR) tablet 25 mg  25 mg Oral BID Tyler Paris MD   Stopped at 09/12/17 2100    nitroglycerin (NITROSTAT) tablet 0.4 mg  0.4 mg SubLINGual PRN Tyler Paris MD        pantoprazole (PROTONIX) tablet 40 mg  40 mg Oral DAILY Tyler Paris MD   40 mg at 09/12/17 0900    pitavastatin calcium (LIVALO) tablet 1 mg  1 mg Oral DAILY Tyler Paris MD   1 mg at 09/12/17 0900    polyethylene glycol (MIRALAX) packet 17 g  17 g Oral PRN Tyler Paris MD        promethazine (PHENERGAN) 6.25 mg/5 mL syrup 6.25 mg  6.25 mg Oral QID PRN Tyler Paris MD        pyridoxine (vitamin B6) (VITAMIN B-6) tablet 50 mg  50 mg Oral DAILY Tyler Paris MD   50 mg at 09/12/17 0942    traMADol (ULTRAM) tablet 50 mg  50 mg Oral Q6H PRN Tyler Paris MD        furosemide (LASIX) injection 60 mg  60 mg IntraVENous Q12H Tyler Paris MD   60 mg at 09/12/17 2156    naloxone (NARCAN) injection 0.4 mg  0.4 mg IntraVENous PRN Tyler Paris MD        heparin (porcine) injection 5,000 Units  5,000 Units SubCUTAneous Q8H Tyler Paris MD   5,000 Units at 09/12/17 2156    insulin lispro (HUMALOG) injection   SubCUTAneous AC&HS Tyler Paris MD   6 Units at 09/13/17 0645    glucose chewable tablet 16 g  4 Tab Oral PRN Tyler Paris MD        glucagon (GLUCAGEN) injection 1 mg  1 mg IntraMUSCular PRN Tyler Paris MD        dextrose (D50W) injection syrg 12.5-25 g  25-50 mL IntraVENous PRN Tyler Paris MD        methylPREDNISolone (PF) (SOLU-MEDROL) injection 40 mg  40 mg IntraVENous Q8H Tyler Paris MD   40 mg at 09/13/17 0644    albuterol-ipratropium (DUO-NEB) 2.5 MG-0.5 MG/3 ML  3 mL Nebulization Q4H RT Donel Render Lam Guillory MD   3 mL at 09/13/17 0718    morphine injection 1 mg  1 mg IntraVENous Q4H PRN Artemio Hartmann MD   1 mg at 09/12/17 2157    melatonin tablet 5 mg  5 mg Oral QHS Artemio Hartmann MD   Stopped at 09/12/17 2200    chlorothiazide (DIURIL) 500 mg in 0.9% sodium chloride 50 mL IVPB  500 mg IntraVENous DAILY Ahmed R Isai, DO   500 mg at 09/12/17 1439    levoFLOXacin (LEVAQUIN) 500 mg in D5W IVPB  500 mg IntraVENous Q48H Ahmed R Isai,  mL/hr at 09/11/17 1219 500 mg at 09/11/17 1219    insulin lispro (HUMALOG) injection 10 Units  10 Units SubCUTAneous Burgess Tommie MD   Stopped at 09/12/17 1130       )Xr Chest Sngl V    Result Date: 9/4/2017  EXAM: AP radiograph of the chest INDICATION: Shortness of breath COMPARISON: March 10, 2017, July 16, 2016  FINDINGS: Normal heart size and mediastinal contour. No consolidation, pleural effusion, or pneumothorax. No acute osseous abnormalities. IMPRESSION: No acute pulmonary findings     Xr Knee Lt Min 4 V    Result Date: 8/1/2017  Indication: Fall, pain. Comments: AP, lateral, and bilateral oblique views of the left knee are submitted for evaluation. There is no evidence of fracture or dislocation. No joint effusion is identified. There is small superior and inferior patella enthesophytes. Minimal articular surface irregularity of the patella. Atherosclerotic calcifications noted. The soft tissue structures are unremarkable. Osseous mineralization is normal.     IMPRESSION: No evidence for acute fracture or dislocation. Mild degenerative changes and atherosclerosis. Ct Head Wo Cont    Result Date: 8/1/2017  CT scan of the head without IV contrast Images: 154  HISTORY: Status post fall.  TECHNIQUE: Serial axial images were obtained from the foramen magnum to the skull vertex without IV contrast. One or more dose reduction techniques were used on this CT: automated exposure control, adjustment of the mAs and/or kVp according to patient's size, and iterative reconstruction techniques. The specific techniques utilized on this CT exam have been documented in the patient's electronic medical record. COMPARISON:  None. FINDINGS: The sulci, ventricles, and basal cisterns are within normal limits for age with age concordant atrophy. There is no intracranial hemorrhage, mass-effect, or midline shift. No extra-axial fluid collections are identified. Periventricular white matter hypodensities are noted. These are nonspecific but often seen with chronic small vessel ischemic change. Otherwise, there are no significant areas of abnormal parenchymal attenuation. The visualized portions of the paranasal sinuses and mastoid air cells show no air-fluid levels. The visualized bones are intact. IMPRESSION: No evidence of acute traumatic injury to the brain or cranium. Atrophy and deep white matter changes are identified. Xr Chest Port    Result Date: 9/11/2017  EXAM: Chest portable INDICATION: Short of breath COMPARISON: Single view chest 9/3/2017  FINDINGS: AP portable chest film was performed. Suboptimal inspiration similar to previous exam. There are new bilaterally symmetrically increased interstitial markings. No focal infiltrate. No definite effusion. No pneumothorax. Heart is at the upper limits of normal. Mild central vascular congestion. IMPRESSION: 1. New symmetrically increased interstitial infiltrates. Differential includes acute onset interstitial pulmonary edema which is felt to be most likely. Acute interstitial pneumonitis is felt to be less likely. Xr Spine Lumb Trauma 2 V    Result Date: 8/1/2017  AP AND LATERAL LUMBAR SPINE: Indication:  Status post fall. Back pain. AP and cross table lateral views, total 4 films. Five non rib bearing lumbar vertebra. There is no evidence of compression fracture.  Minimal anterolisthesis of L4 and L5 likely on degenerative basis. Moderate to marked loss of disc height at L2-3, through L5-S1. Marked to severe facet arthropathy at all levels. Marked aortic atherosclerosis. Bilateral hip arthropathy. IMPRESSION: 1. No compression fracture. 2. Multilevel spondylosis with moderate to marked degenerative disease and facet arthropathy. Nm Lung Perfusion W Vent    Result Date: 9/11/2017  Ventilation-perfusion lung scan History:  Shortness of breath, acute in onset. Comparison:  Portable chest earlier the same day Technique: Ventilation imaging was performed after inhalation of 0.8 millicuries of Tc 78O DTPA with a nebulizer followed by imaging in multiple projections. Perfusion imaging was performed after intravenous injection of 7.2 millicuries of Tc 61B MAA followed by imaging in multiple projections. Injection site: Right antecubital fossa vein Findings: Ventilation imaging shows no significant ventilation defects. Perfusion imaging shows mild inhomogeneity along the peripheral aspect of the lungs. No focal segmental perfusion defect is seen. No perfusion mismatches are present. Impression: Low probability for pulmonary embolism without definite focal segmental perfusion defect.

## 2017-09-13 NOTE — PROGRESS NOTES
Speech Language Pathology   Patient was AOx2 during this encounter. The patient did not recognize SLP today during second encounter. Discussed verbal MD orders obtained for MBS. Information re: appointment time with the patient as well as the purpose of the MBS and dysphagia interventions for head and neck cancer. Patient verbalized understanding. D/w ALAINA Stevens.     Subjective: \"That girl from yesterday wouldn't let me eat. Now, you're here telling me about this test. I just want to eat\".     Thank you for this referral,   Sahara Cannon M.S.Ed., CCC/SLP  Speech Language Pathologist

## 2017-09-13 NOTE — PROGRESS NOTES
Problem: Mobility Impaired (Adult and Pediatric)  Goal: *Acute Goals and Plan of Care (Insert Text)  Physical Therapy Goals LT/ST  Initiated 9/12/2017 and to be accomplished within 3-5 day(s)  1. Patient will move from supine <> sit with S in prep for out of bed activity and change of position. 2. Patient will perform sit<> stand with S with LRAD in prep for transfers/ambulation. 3. Patient will transfer from bed <> chair with S with LRAD for time up in chair for completion of ADL activity. 4. Patient will ambulate 150 feet with LRAD/S for improved functional mobility/safe discharge. Outcome: Progressing Towards Goal  PHYSICAL THERAPY TREATMENT     Patient: Braeden Valencia (54 y.o. female)  Date: 9/13/2017  Diagnosis: CHF (congestive heart failure) ([facility])  Hyperkalemia  Heart failure ([facility]) Diastolic congestive heart failure ([facility]       Precautions: Fall   Chart, physical therapy assessment, plan of care and goals were reviewed. ASSESSMENT:  Pt is not eager to exit bed, due to fear of exposure. Max encouragement provided. Pt present with max posterior lean at EOB and also with standing, but able to correct with forward reach and anterior positioning. Pt was able to amb to/from doorway x 2, with 5 min rest. SPO2 decreased to 90% on room air with activity. 1L NC resumed at ended of session. Progression toward goals:  [ ]      Improving appropriately and progressing toward goals  [X]      Improving slowly and progressing toward goals  [ ]      Not making progress toward goals and plan of care will be adjusted       PLAN:  Patient continues to benefit from skilled intervention to address the above impairments. Continue treatment per established plan of care.   Discharge Recommendations:  Abdelrahman Zelaya  Further Equipment Recommendations for Discharge:  bedside commode and rolling walker       SUBJECTIVE:   Patient stated I'm not going to be doing any therapy, until they let me go get my clothes.       OBJECTIVE DATA SUMMARY:   Critical Behavior:  Neurologic State: Alert  Orientation Level: Oriented X4  Cognition: Follows commands, Memory loss  Safety/Judgement: Insight into deficits  Functional Mobility Training:  Bed Mobility:  Supine to Sit: Minimum assistance  Sit to Supine: Minimum assistance  Scooting: Minimum assistance  Transfers:  Sit to Stand: Contact guard assistance;Minimum assistance  Stand to Sit: Contact guard assistance  Balance:  Sitting: Intact  Standing: Intact; With support  Ambulation/Gait Training:  Distance (ft): 60 Feet (ft) (30+30)  Assistive Device: Gait belt;Walker, rolling  Ambulation - Level of Assistance: Contact guard assistance;Minimal assistance  Gait Abnormalities: Decreased step clearance  Base of Support: Narrowed  Stance: Time  Speed/Rhona: Slow  Step Length: Left shortened;Right shortened  Activity Tolerance:   Fair-  Please refer to the flowsheet for vital signs taken during this treatment.   After treatment:   [ ] Patient left in no apparent distress sitting up in chair  [X] Patient left in no apparent distress in bed  [X] Call bell left within reach  [X] Nursing notified  [X] Caregiver present  [ ] Bed alarm activated      Trey Alcazar PTA   Time Calculation: 35 mins

## 2017-09-13 NOTE — PROGRESS NOTES
Problem: Mobility Impaired (Adult and Pediatric)  Goal: *Acute Goals and Plan of Care (Insert Text)  Physical Therapy Goals LT/ST  Initiated 9/12/2017 and to be accomplished within 3-5 day(s)  1. Patient will move from supine <> sit with S in prep for out of bed activity and change of position. 2. Patient will perform sit<> stand with S with LRAD in prep for transfers/ambulation. 3. Patient will transfer from bed <> chair with S with LRAD for time up in chair for completion of ADL activity. 4. Patient will ambulate 150 feet with LRAD/S for improved functional mobility/safe discharge. Outcome: Progressing Towards Goal  PT session held due to:  [X]  Off unit to x-ray   [ ]  RN Communication/ suggestion  [ ]  Extreme Pain  [ ]  Dialysis treatment in progress. Will f/u later as pt's schedule allows. Thank you.   Paresh Snell, PTA

## 2017-09-13 NOTE — ROUTINE PROCESS
Bedside and Verbal shift change report given to MARGE Benitez RN (oncoming nurse) by Ester Escobar RN (offgoing nurse). Report included the following information SBAR, Kardex, Intake/Output, MAR and Recent Results.

## 2017-09-14 LAB
ALBUMIN SERPL-MCNC: 3.3 G/DL (ref 3.4–5)
ANION GAP SERPL CALC-SCNC: 5 MMOL/L (ref 3–18)
BACTERIA SPEC CULT: ABNORMAL
BACTERIA SPEC CULT: ABNORMAL
BUN SERPL-MCNC: 68 MG/DL (ref 7–18)
BUN/CREAT SERPL: 35 (ref 12–20)
CALCIUM SERPL-MCNC: 10.1 MG/DL (ref 8.5–10.1)
CHLORIDE SERPL-SCNC: 97 MMOL/L (ref 100–108)
CO2 SERPL-SCNC: 34 MMOL/L (ref 21–32)
CREAT SERPL-MCNC: 1.92 MG/DL (ref 0.6–1.3)
GLUCOSE BLD STRIP.AUTO-MCNC: 119 MG/DL (ref 70–110)
GLUCOSE BLD STRIP.AUTO-MCNC: 139 MG/DL (ref 70–110)
GLUCOSE BLD STRIP.AUTO-MCNC: 167 MG/DL (ref 70–110)
GLUCOSE BLD STRIP.AUTO-MCNC: 200 MG/DL (ref 70–110)
GLUCOSE BLD STRIP.AUTO-MCNC: 93 MG/DL (ref 70–110)
GLUCOSE SERPL-MCNC: 208 MG/DL (ref 74–99)
PHOSPHATE SERPL-MCNC: 2.9 MG/DL (ref 2.5–4.9)
POTASSIUM SERPL-SCNC: 3 MMOL/L (ref 3.5–5.5)
SERVICE CMNT-IMP: ABNORMAL
SODIUM SERPL-SCNC: 136 MMOL/L (ref 136–145)

## 2017-09-14 PROCEDURE — 74011250637 HC RX REV CODE- 250/637: Performed by: INTERNAL MEDICINE

## 2017-09-14 PROCEDURE — 94640 AIRWAY INHALATION TREATMENT: CPT

## 2017-09-14 PROCEDURE — 94760 N-INVAS EAR/PLS OXIMETRY 1: CPT

## 2017-09-14 PROCEDURE — 65660000000 HC RM CCU STEPDOWN

## 2017-09-14 PROCEDURE — 80069 RENAL FUNCTION PANEL: CPT | Performed by: INTERNAL MEDICINE

## 2017-09-14 PROCEDURE — 97116 GAIT TRAINING THERAPY: CPT

## 2017-09-14 PROCEDURE — 74011250636 HC RX REV CODE- 250/636: Performed by: INTERNAL MEDICINE

## 2017-09-14 PROCEDURE — 74011250637 HC RX REV CODE- 250/637: Performed by: HOSPITALIST

## 2017-09-14 PROCEDURE — 74011636637 HC RX REV CODE- 636/637: Performed by: HOSPITALIST

## 2017-09-14 PROCEDURE — 36415 COLL VENOUS BLD VENIPUNCTURE: CPT | Performed by: INTERNAL MEDICINE

## 2017-09-14 PROCEDURE — 74011000258 HC RX REV CODE- 258: Performed by: INTERNAL MEDICINE

## 2017-09-14 PROCEDURE — 74011636637 HC RX REV CODE- 636/637: Performed by: INTERNAL MEDICINE

## 2017-09-14 PROCEDURE — 97530 THERAPEUTIC ACTIVITIES: CPT

## 2017-09-14 PROCEDURE — 74011000250 HC RX REV CODE- 250: Performed by: INTERNAL MEDICINE

## 2017-09-14 PROCEDURE — 77010033678 HC OXYGEN DAILY

## 2017-09-14 PROCEDURE — 82962 GLUCOSE BLOOD TEST: CPT

## 2017-09-14 RX ORDER — LANOLIN ALCOHOL/MO/W.PET/CERES
25 CREAM (GRAM) TOPICAL DAILY
Status: DISCONTINUED | OUTPATIENT
Start: 2017-09-14 | End: 2017-09-20 | Stop reason: HOSPADM

## 2017-09-14 RX ORDER — POTASSIUM CHLORIDE 20MEQ/15ML
40 LIQUID (ML) ORAL
Status: COMPLETED | OUTPATIENT
Start: 2017-09-14 | End: 2017-09-14

## 2017-09-14 RX ORDER — IPRATROPIUM BROMIDE AND ALBUTEROL SULFATE 2.5; .5 MG/3ML; MG/3ML
3 SOLUTION RESPIRATORY (INHALATION)
Status: DISCONTINUED | OUTPATIENT
Start: 2017-09-14 | End: 2017-09-20 | Stop reason: HOSPADM

## 2017-09-14 RX ORDER — FUROSEMIDE 10 MG/ML
40 INJECTION INTRAMUSCULAR; INTRAVENOUS EVERY 12 HOURS
Status: DISCONTINUED | OUTPATIENT
Start: 2017-09-14 | End: 2017-09-18

## 2017-09-14 RX ADMIN — ISOSORBIDE MONONITRATE 30 MG: 30 TABLET, EXTENDED RELEASE ORAL at 09:24

## 2017-09-14 RX ADMIN — INSULIN LISPRO 3 UNITS: 100 INJECTION, SOLUTION INTRAVENOUS; SUBCUTANEOUS at 11:41

## 2017-09-14 RX ADMIN — CLOPIDOGREL BISULFATE 75 MG: 75 TABLET ORAL at 09:24

## 2017-09-14 RX ADMIN — METOPROLOL TARTRATE 25 MG: 25 TABLET ORAL at 22:28

## 2017-09-14 RX ADMIN — PYRIDOXINE HCL TAB 50 MG 25 MG: 50 TAB at 09:23

## 2017-09-14 RX ADMIN — FUROSEMIDE 40 MG: 10 INJECTION, SOLUTION INTRAMUSCULAR; INTRAVENOUS at 22:30

## 2017-09-14 RX ADMIN — METOPROLOL TARTRATE 25 MG: 25 TABLET ORAL at 09:24

## 2017-09-14 RX ADMIN — Medication 5 MG: at 22:28

## 2017-09-14 RX ADMIN — MIRTAZAPINE 30 MG: 15 TABLET, ORALLY DISINTEGRATING ORAL at 22:28

## 2017-09-14 RX ADMIN — LEVOTHYROXINE SODIUM 150 MCG: 150 TABLET ORAL at 06:55

## 2017-09-14 RX ADMIN — AMLODIPINE BESYLATE 10 MG: 5 TABLET ORAL at 09:24

## 2017-09-14 RX ADMIN — PREDNISONE 40 MG: 20 TABLET ORAL at 09:24

## 2017-09-14 RX ADMIN — INSULIN LISPRO 10 UNITS: 100 INJECTION, SOLUTION INTRAVENOUS; SUBCUTANEOUS at 08:59

## 2017-09-14 RX ADMIN — INSULIN LISPRO 10 UNITS: 100 INJECTION, SOLUTION INTRAVENOUS; SUBCUTANEOUS at 13:00

## 2017-09-14 RX ADMIN — VITAMIN D, TAB 1000IU (100/BT) 1000 UNITS: 25 TAB at 09:24

## 2017-09-14 RX ADMIN — FUROSEMIDE 40 MG: 10 INJECTION, SOLUTION INTRAMUSCULAR; INTRAVENOUS at 10:10

## 2017-09-14 RX ADMIN — INSULIN GLARGINE 55 UNITS: 100 INJECTION, SOLUTION SUBCUTANEOUS at 09:05

## 2017-09-14 RX ADMIN — PANTOPRAZOLE SODIUM 40 MG: 40 TABLET, DELAYED RELEASE ORAL at 09:24

## 2017-09-14 RX ADMIN — HEPARIN SODIUM 5000 UNITS: 5000 INJECTION, SOLUTION INTRAVENOUS; SUBCUTANEOUS at 11:42

## 2017-09-14 RX ADMIN — HEPARIN SODIUM 5000 UNITS: 5000 INJECTION, SOLUTION INTRAVENOUS; SUBCUTANEOUS at 18:47

## 2017-09-14 RX ADMIN — HEPARIN SODIUM 5000 UNITS: 5000 INJECTION, SOLUTION INTRAVENOUS; SUBCUTANEOUS at 02:18

## 2017-09-14 RX ADMIN — CLONIDINE HYDROCHLORIDE 0.2 MG: 0.1 TABLET ORAL at 09:24

## 2017-09-14 RX ADMIN — ASPIRIN 81 MG: 81 TABLET, COATED ORAL at 09:23

## 2017-09-14 RX ADMIN — CHLOROTHIAZIDE SODIUM 500 MG: 500 INJECTION, POWDER, LYOPHILIZED, FOR SOLUTION INTRAVENOUS at 11:42

## 2017-09-14 RX ADMIN — POTASSIUM CHLORIDE 40 MEQ: 20 SOLUTION ORAL at 09:30

## 2017-09-14 RX ADMIN — INSULIN LISPRO 6 UNITS: 100 INJECTION, SOLUTION INTRAVENOUS; SUBCUTANEOUS at 07:30

## 2017-09-14 RX ADMIN — CLONIDINE HYDROCHLORIDE 0.2 MG: 0.1 TABLET ORAL at 22:28

## 2017-09-14 RX ADMIN — IPRATROPIUM BROMIDE AND ALBUTEROL SULFATE 3 ML: .5; 3 SOLUTION RESPIRATORY (INHALATION) at 11:22

## 2017-09-14 RX ADMIN — Medication 400 MG: at 09:24

## 2017-09-14 RX ADMIN — CLOTRIMAZOLE AND BETAMETHASONE DIPROPIONATE: 10; .5 CREAM TOPICAL at 22:30

## 2017-09-14 RX ADMIN — IPRATROPIUM BROMIDE AND ALBUTEROL SULFATE 3 ML: .5; 3 SOLUTION RESPIRATORY (INHALATION) at 07:15

## 2017-09-14 NOTE — PROGRESS NOTES
Hospitalist Progress Note-critical care note     Patient: David Kowalski MRN: [id number]  CSN: [id number]    YOB: 1932  Age: 80 y.o.   Sex: female    DOA: 9/10/2017 LOS:  LOS: 4 days            Chief complaint: chf exacerbation, pam on ckd, carcinoma,   Stridor, anxiety   Assessment/Plan         Hospital Problems  Date Reviewed: 9/11/2017          Codes Class Noted POA    Dysphagia ICD-10-CM: R13.10  ICD-9-CM: 787.20  9/13/2017 Unknown        Stridor ICD-10-CM: R06.1  ICD-9-CM: 786.1  9/11/2017 Unknown        Localized cancer of lip, oral cavity and throat (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 ICD-10-CM: I50.9  ICD-9-CM: 428.9  9/11/2017 Unknown        Acute renal failure superimposed on stage 4 chronic kidney disease ([facility] ICD-10-CM: N17.9, N18.4  ICD-9-CM: 584.9, 585.4  9/11/2017 Unknown        Hyperkalemia ICD-10-CM: E87.5  ICD-9-CM: 276.7  9/10/2017 Unknown        CKD (chronic kidney disease) stage 4, GFR 15-29 ml/min ([facility]) ICD-10-CM: N18.4  ICD-9-CM: 585.4  3/12/2017 Yes        PAM (acute kidney injury) ([facility] ICD-10-CM: N17.9  ICD-9-CM: 584.9  7/17/2016 Yes        DM (diabetes mellitus) ([facility] ICD-10-CM: E11.9  ICD-9-CM: 250.00  7/17/2016 Yes        * (Principal)Diastolic congestive heart failure ([address] ICD-10-CM: I50.30  ICD-9-CM: 428.30, 428.0  7/16/2016 Unknown        Shortness of breath ICD-10-CM: R06.02  ICD-9-CM: 786.05  5/3/2015 Yes            1. CHF (congestive heart failure) (HCC)   Continue improving   Ef wnl, diastolic dysfunction    Will continue diuretics , will switch to po on discharge    And monitor electrolytes         2 PAM (acute kidney injury) on ckd4   Cr mild improving,   Renal on board , cleared to be d/c per renal       3. DM (diabetes mellitus) ([facility] (7/17/2016)  Will decrease steroid. Continue  lantus and premeal insulin. ssi       4  Hyperkalemia (9/10/2017)   resolved. 5. stridor   No airway problem    will continue iv steroid and abx -switch to po and d/c abx   Speech  On board   6. 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 - will hold radiation per oncology     6. Anxiety (9/11/2017)  Continue ativan prn   7 Dysphagia ; dysohagia diet ,     Cm is working placement     Subjective: I want radiation , need leave here   Nurse :no acute issue     Will d/c to rehab tomorrow if found bed     Review of systems:    General: No fevers or chills. Cardiovascular: No chest pain or pressure. No palpitations. Pulmonary:  shortness of breath is better   Gastrointestinal: No nausea, vomiting. Vital signs/Intake and Output:  Visit Vitals    /57 (BP 1 Location: Left arm, BP Patient Position: At rest)    Pulse 81    Temp 99.6 °F (37.6 °C)    Resp 18    Ht 5' (1.524 m)    Wt 81.4 kg (179 lb 7.3 oz)    SpO2 95%    BMI 35.05 kg/m2     Current Shift:  09/14 0701 - 09/14 1900  In: 530 [P.O.:480; I.V.:50]  Out: -   Last three shifts:  09/12 1901 - 09/14 0700  In: 124.6 [P.O.:40; I.V.:84.6]  Out: 4275 [Urine:4275]    Physical Exam:  General: WD, WN. Alert, cooperative, no acute distress    HEENT: NC, swelling and enlarged  of chin   PERRLA, anicteric sclerae. Lungs: CTA Bilaterally. No Wheezing/Rhonchi/Rales. Heart:  Regular  rhythm,  No murmur, No Rubs, No Gallops  Abdomen: Soft, Non distended, Non tender.  +Bowel sounds,   Extremities: No c/c/e  Psych:   No anxious , no agitated. Neurologic:  No acute neurological deficit.              Labs: Results:       Chemistry Recent Labs      09/14/17   0618  09/13/17   0848  09/12/17   0546   GLU  208*  229*  244*   NA  136  131*  130*   K  3.0*  3.3*  4.0   CL  97*  91*  91*   CO2 34*  29  28   BUN  68*  57*  49*   CREA  1.92*  1.83*  1.99*   CA  10.1  10.0  9.8   AGAP  5  11  11   BUCR  35*  31*  25*   AP   --    --   85   TP   --    --   7.2   ALB  3.3*  3.2*  3.0*   GLOB   --    --   4.2*   AGRAT   --    --   0.7*      CBC w/Diff Recent Labs      09/12/17   0546   WBC  21.5*   RBC  4.26   HGB  10.4*   HCT  30.5*   PLT  514*   GRANS  93*   LYMPH  1*   EOS  0      Cardiac Enzymes Recent Labs      09/11/17   1730   CPK  64   CKND1  2.3      Coagulation No results for input(s): PTP, INR, APTT in the last 72 hours. No lab exists for component: INREXT, INREXT    Lipid Panel Lab Results   Component Value Date/Time    Cholesterol, total 124 07/17/2016 02:21 AM    HDL Cholesterol 41 07/17/2016 02:21 AM    LDL, calculated 57.6 07/17/2016 02:21 AM    VLDL, calculated 25.4 07/17/2016 02:21 AM    Triglyceride 127 07/17/2016 02:21 AM    CHOL/HDL Ratio 3.0 07/17/2016 02:21 AM      BNP No results for input(s): BNPP in the last 72 hours. Liver Enzymes Recent Labs      09/14/17   0618   09/12/17   0546   TP   --    --   7.2   ALB  3.3*   < >  3.0*   AP   --    --   85   SGOT   --    --   11*    < > = values in this interval not displayed.       Thyroid Studies Lab Results   Component Value Date/Time    TSH 1.92 03/11/2017 04:41 AM        Procedures/imaging: see electronic medical records for all procedures/Xrays and details which were not copied into this note but were reviewed prior to creation of Mamadou Fried MD

## 2017-09-14 NOTE — PROGRESS NOTES
Phone: [phone]  Paging : [phone]      Hematology / Oncology Progress Note    Admit Date: 9/10/2017    Assessment:     Principal Problem:    Diastolic congestive heart failure ([facility] [address] (7/16/2016)    Active Problems:    Shortness of breath (5/3/2015)      PAM (acute kidney injury) ([address] (7/17/2016)      DM (diabetes mellitus) ([facility] [address] (7/17/2016)      CKD (chronic kidney disease) stage 4, GFR 15-29 ml/min ([facility]) (3/12/2017)      Hyperkalemia (9/10/2017)      Stridor (9/11/2017)      Localized cancer of lip, oral cavity and throat ([facility] [address] (9/11/2017)      Overview: 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 Nephrology following. Diuresis. 2) CHF/HTN/Stridor- Prednisone, Lasix, Diuril diuresis. Norvasc, Catapress, Lopressor, Imdur  3) Anxiety- Ativan prn. Oriented to name. Consider psych consultation. 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 On hold. Approx 3 weeks XRT remaining (Compliance issues per Dr. Prvaeen Cat). 5) Leukocytosis- UTI and receiving Prednisone. 6) UTI. Urine Cx pending. Empiric ABx (Levaquin). 6) Anemia- 9/12/2017 Hgb 10.4 g/dl. 7) DMT2- Humalog. 8) HLP- Pitavastatin  9) Hypothyroidism- Synthroid  10) PTx/OTx      Review of Systems  GENERAL: Patient alert, awake and oriented times 1-2, able to communicate with full sentences and not in distress. Anxious. Thinks she's at her house. HEENT: No change in vision, no earache, tinnitus, sore throat or sinus congestion. NECK: No pain or stiffness. + Rad/Onc treatment markings  PULMONARY: +shortness of breath and wheezing.    Cardiovascular: no pnd or orthopnea, no CP  GASTROINTESTINAL: + Difficulty swallowing, No abdominal pain, nausea, vomiting or diarrhea, melena or bright red blood per rectum. GENITOURINARY: No urinary frequency, urgency, hesitancy or dysuria. MUSCULOSKELETAL: No joint or muscle pain, no back pain, no recent trauma. DERMATOLOGIC: No rash, no itching, no lesions. ENDOCRINE: No polyuria, polydipsia, no heat or cold intolerance. No recent change in weight. HEMATOLOGICAL: No anemia or easy bruising or bleeding. NEUROLOGIC: No headache, seizures, numbness, tingling or weakness. Objective:     Visit Vitals    /61 (BP 1 Location: Left arm)    Pulse 92    Temp 98.3 °F (36.8 °C)    Resp 19    Ht 5' (1.524 m)    Wt 81.4 kg (179 lb 7.3 oz)    SpO2 97%    BMI 35.05 kg/m2   . Date 17 - 17 0659 17 07 - 09/15/17 0659   Shift 8681-1166 6401-7581 24 Hour Total 0743-5747 9233-8331 24 Hour Total   I  N  T  A  K  E   P.O. 40  40         P. O. 40  40       Shift Total  (mL/kg) 40  (0.5)  40  (0.5)      O  U  T  P  U  T   Urine  (mL/kg/hr) 600  (0.6) 2575  (2.6) 3175  (1.6)         Urine Output (mL) ([REMOVED] Urinary Catheter 09/10/17 Rocha) 600 2575 3175       Shift Total  (mL/kg) 600  (7.3) 2575  (31.6) 3175  (39)      NET -560 -7066 -7946      Weight (kg) 82.5 81.4 81.4 81.4 81.4 81.4     Temp (24hrs), Av.3 °F (36.8 °C), Min:98 °F (36.7 °C), Max:98.8 °F (37.1 °C)     General:  Alert, disoriented, cooperative, appears stated age. Head: Normocephalic, without obvious abnormality, atraumatic. Eyes:  Conjunctivae/corneas clear. PERRL, EOMs intact. Nose: Nares normal. No drainage or sinus tenderness. No stridor of upper airway    Neck: Supple, radiation changes across mandible and neck with radiation markers, Large submental mass 5 cm. .   Lungs:   Clear to auscultation bilaterally. Heart:  Regular rate and rhythm, S1, S2 normal.     Abdomen: Obese, Soft, non-tender.  Bowel sounds normal. anarsaca Extremities: Extremities normal, atraumatic, no cyanosis plus 1-2+ pittting edema. Bilateral lower extremites    Pulses: 2+ and symmetric all extremities. Skin:  No rashes or lesions   Neurologic: AAOx1-2, No focal motor or sensory deficit     Imaging:       pCXR 9/10/2017-  New symmetrically increased interstitial infiltrates. Differential includes  acute onset interstitial pulmonary edema which is felt to be most likely. Acute  interstitial pneumonitis is felt to be less likely. V/Q Scan 9/11/2017-  Low probability for pulmonary embolism without definite focal segmental  perfusion defect. ECHO 9/11/2017-  Left ventricle: Systolic function was normal. Ejection fraction was estimated   to be 55 % in the range of 50 % to 60 %. Suboptimal endocardial visualization limits wall motion analysis. Mitral valve: There was mild to moderate annular calcification. Tricuspid valve: There was mild regurgitation. Pulmonary artery systolic   pressure was mildly increased. Pulmonary  artery systolic pressure: 30 mmHg.     Recent Results (from the past 24 hour(s))   RENAL FUNCTION PANEL    Collection Time: 09/13/17  8:48 AM   Result Value Ref Range    Sodium 131 (L) 136 - 145 mmol/L    Potassium 3.3 (L) 3.5 - 5.5 mmol/L    Chloride 91 (L) 100 - 108 mmol/L    CO2 29 21 - 32 mmol/L    Anion gap 11 3.0 - 18 mmol/L    Glucose 229 (H) 74 - 99 mg/dL    BUN 57 (H) 7.0 - 18 MG/DL    Creatinine 1.83 (H) 0.6 - 1.3 MG/DL    BUN/Creatinine ratio 31 (H) 12 - 20      GFR est AA 32 (L) >60 ml/min/1.73m2    GFR est non-AA 26 (L) >60 ml/min/1.73m2    Calcium 10.0 8.5 - 10.1 MG/DL    Phosphorus 4.1 2.5 - 4.9 MG/DL    Albumin 3.2 (L) 3.4 - 5.0 g/dL   GLUCOSE, POC    Collection Time: 09/13/17 11:55 AM   Result Value Ref Range    Glucose (POC) 289 (H) 70 - 110 mg/dL   GLUCOSE, POC    Collection Time: 09/13/17  4:16 PM   Result Value Ref Range    Glucose (POC) 161 (H) 70 - 110 mg/dL   GLUCOSE, POC    Collection Time: 09/13/17 9:04 PM   Result Value Ref Range    Glucose (POC) 229 (H) 70 - 110 mg/dL   RENAL FUNCTION PANEL    Collection Time: 09/14/17  6:18 AM   Result Value Ref Range    Sodium 136 136 - 145 mmol/L    Potassium 3.0 (L) 3.5 - 5.5 mmol/L    Chloride 97 (L) 100 - 108 mmol/L    CO2 34 (H) 21 - 32 mmol/L    Anion gap 5 3.0 - 18 mmol/L    Glucose 208 (H) 74 - 99 mg/dL    BUN 68 (H) 7.0 - 18 MG/DL    Creatinine 1.92 (H) 0.6 - 1.3 MG/DL    BUN/Creatinine ratio 35 (H) 12 - 20      GFR est AA 30 (L) >60 ml/min/1.73m2    GFR est non-AA 25 (L) >60 ml/min/1.73m2    Calcium 10.1 8.5 - 10.1 MG/DL    Phosphorus 2.9 2.5 - 4.9 MG/DL    Albumin 3.3 (L) 3.4 - 5.0 g/dL   GLUCOSE, POC    Collection Time: 09/14/17  6:27 AM   Result Value Ref Range    Glucose (POC) 200 (H) 70 - 110 mg/dL       Current Facility-Administered Medications:     insulin glargine (LANTUS) injection 55 Units, 55 Units, SubCUTAneous, DAILY, Julia Kirkpatrick MD, 55 Units at 09/13/17 0955    predniSONE (DELTASONE) tablet 40 mg, 40 mg, Oral, DAILY WITH BREAKFAST, Julia Kirkpatrick MD    mirtazapine (REMERON SOL-TAB) disintegrating tablet 30 mg, 30 mg, Oral, QHS, Artemio Hartmann MD, 30 mg at 09/13/17 2148    acetaminophen (TYLENOL) tablet 650 mg, 650 mg, Oral, Q4H PRN, Artemio Hartmann MD, 650 mg at 09/12/17 0634    amLODIPine (NORVASC) tablet 10 mg, 10 mg, Oral, DAILY, Artemio Hartmann MD, 10 mg at 09/13/17 1104    aspirin delayed-release tablet 81 mg, 81 mg, Oral, DAILY, Artemio Hartmann MD, 81 mg at 09/13/17 1204    calcium carbonate (TUMS) chewable tablet 400 mg [elemental], 400 mg, Oral, TID, Artemio Hartmann MD, 400 mg at 09/13/17 1204    cholecalciferol (VITAMIN D3) tablet 1,000 Units, 1,000 Units, Oral, DAILY, Artemio Hartmann MD, 1,000 Units at 09/13/17 1206    cloNIDine HCl (CATAPRES) tablet 0.2 mg, 0.2 mg, Oral, BID, Artemio Hartmann MD, 0.2 mg at 09/13/17 2148    clopidogrel (PLAVIX) tablet 75 mg, 75 mg, Oral, DAILY, Gayl Dakin Scott-Brown, MD, 75 mg at 09/13/17 1206    clotrimazole-betamethasone (LOTRISONE) 1-0.05 % cream, , Topical, BID, Sanjay Squires MD    diphenhydrAMINE (BENADRYL) capsule 25 mg, 25 mg, Oral, Q6H PRN, Sanjay Squires MD    isosorbide mononitrate ER (IMDUR) tablet 30 mg, 30 mg, Oral, BID, Sanjay Squires MD, 30 mg at 09/13/17 1104    levothyroxine (SYNTHROID) tablet 150 mcg, 150 mcg, Oral, ACB, Sanjay Squires MD, 150 mcg at 09/14/17 0655    LORazepam (ATIVAN) tablet 0.5 mg, 0.5 mg, Oral, Q8H PRN, Sanjay Squires MD, 0.5 mg at 09/11/17 2241    magnesium oxide (MAG-OX) tablet 400 mg, 400 mg, Oral, DAILY, Sanjay Squires MD, 400 mg at 09/13/17 1204    meclizine (ANTIVERT) tablet 12.5 mg, 12.5 mg, Oral, Q6H PRN, Sanjay Squires MD, 12.5 mg at 09/13/17 1720    metoprolol tartrate (LOPRESSOR) tablet 25 mg, 25 mg, Oral, BID, Sanjay Squires MD, 25 mg at 09/13/17 2148    nitroglycerin (NITROSTAT) tablet 0.4 mg, 0.4 mg, SubLINGual, PRN, Sanjay Squires MD    pantoprazole (PROTONIX) tablet 40 mg, 40 mg, Oral, DAILY, Sanjay Squires MD, 40 mg at 09/13/17 1205    pitavastatin calcium (LIVALO) tablet 1 mg, 1 mg, Oral, DAILY, Sanjay Squires MD, 1 mg at 09/13/17 0900    polyethylene glycol (MIRALAX) packet 17 g, 17 g, Oral, PRN, Sanjay Squires MD    promethazine (PHENERGAN) 6.25 mg/5 mL syrup 6.25 mg, 6.25 mg, Oral, QID PRN, Sanjay Squires MD    pyridoxine (vitamin B6) (VITAMIN B-6) tablet 50 mg, 50 mg, Oral, DAILY, Sanjay Squires MD, 50 mg at 09/13/17 1204    traMADol (ULTRAM) tablet 50 mg, 50 mg, Oral, Q6H PRN, Sanjay Squires MD    furosemide (LASIX) injection 60 mg, 60 mg, IntraVENous, Q12H, Sanjay Squires MD, 60 mg at 09/13/17 2153    naloxone (NARCAN) injection 0.4 mg, 0.4 mg, IntraVENous, PRN, Sanjay Squires MD    heparin (porcine) injection 5,000 Units, 5,000 Units, SubCUTAneous, Q8H, Sanjay Squires MD, 5,000 Units at 09/14/17 0218    insulin lispro (HUMALOG) injection, , SubCUTAneous, AC&HS, Jame Isaacs MD, 6 Units at 09/14/17 0730    glucose chewable tablet 16 g, 4 Tab, Oral, PRN, Jame Isaacs MD    glucagon (GLUCAGEN) injection 1 mg, 1 mg, IntraMUSCular, PRN, Jame Isaacs MD    dextrose (D50W) injection syrg 12.5-25 g, 25-50 mL, IntraVENous, PRN, Jame Isaacs MD    albuterol-ipratropium (DUO-NEB) 2.5 MG-0.5 MG/3 ML, 3 mL, Nebulization, Q4H RT, Jame Isaacs MD, 3 mL at 09/14/17 0715    morphine injection 1 mg, 1 mg, IntraVENous, Q4H PRN, Jame Isaacs MD, 1 mg at 09/13/17 2153    melatonin tablet 5 mg, 5 mg, Oral, QHS, Jame Isaacs MD, Stopped at 09/12/17 2200    chlorothiazide (DIURIL) 500 mg in 0.9% sodium chloride 50 mL IVPB, 500 mg, IntraVENous, DAILY, Anni Alex DO, 500 mg at 09/13/17 1110    levoFLOXacin (LEVAQUIN) 500 mg in D5W IVPB, 500 mg, IntraVENous, Q48H, Anni Alex DO, Last Rate: 100 mL/hr at 09/13/17 1103, 500 mg at 09/13/17 1103    insulin lispro (HUMALOG) injection 10 Units, 10 Units, SubCUTAneous, TIDAC, Mauricio Lentz MD, Stopped at 09/13/17 1201 [facility]

## 2017-09-14 NOTE — ROUTINE PROCESS
Bedside and Verbal shift change report given to MARGE Pascal (oncoming nurse) by Fonnie Spurling (offgoing nurse). Report included the following information SBAR, Kardex, Intake/Output, MAR and Recent Results.

## 2017-09-14 NOTE — PROGRESS NOTES
D/c plan anticipate rehab    Telephone call with palak at [facility] states she cannot take patient back due to they are only an assisted living and they cannot accommodate snf recommendations made by PT. Telephone call to patients daughter verbalized she understood this and she was provided foc.  Chencho Sepulveda will place referrals per daughters request.     CM will reach out to rehabs as requested

## 2017-09-14 NOTE — PROGRESS NOTES
Bedside and Verbal shift change report given to Phylicia Ramirez RN (oncoming nurse) by MARGE Pascal RN (offgoing nurse). Report included the following information SBAR, Kardex, Intake/Output, MAR and Med Rec Status.

## 2017-09-14 NOTE — PROGRESS NOTES
1935:  Received patient from GLORIA Singh RN.    2043:  Assessment completed. Patient alert to self, confused to place and time and situation. Patient stated multiple times, \"It's so nice of you to let me stay at your house. \"  Reoriented patient but patient is very talkative and anxious and continues to state \"I am going to pay you really good for all of this work you are doing. \"  Patient c/o pain to lower abdomen, she states it is \"sharp and just shoots all different ways. \"  Patient states she does not want pain medication at this time, repositioned patient. Tilley catheter in place, patient refuses to have it removed at this time. Reasons for removing tilley explained and patient stated, \"I don't care, leave it until morning because I don't want to pee in your house. \"  Tilley left in place at this time. Patient denies other pain at this time. Telemetry monitor #11 shows NSR, lungs are CTA on 2LNC, BS + all 4 quads. No other needs stated. 2148:  Scheduled medications given. Patient refuses Imdur stating, \"I can't swallow pills so if you can't crush it, I am not taking it. \"  Also refuses melatonin, Tums, and her Lotrisone cream. She states, \"I don't need those now. \"  Patient continues to complain of pain to lower abdomen, Morphine 1 mg given per PRN orders at this time. Repositioned patient in bed, call bell in reach. No other needs stated. 2232:  Rounds completed. Patient resting in bed with eyes closed. No needs noted. 2359:  Reassessment completed. No changes from previous assessment noted. Patient resting in bed watching tv. 0157:  Rounds completed. Resting quietly in bed with no needs stated. 4106:  Reassessment completed. No changes from previous assessment noted. Patient denies pain and no needs are stated.      0502:  Rounds completed. No needs noted. 0700: Tilley catheter removed at this time.

## 2017-09-14 NOTE — PROGRESS NOTES
Patient was visited by SONJA. Volunteer followed up with patient and/or family and reported no needs to this . Chaplains will continue to follow and will provide pastoral care as needed or requested. [address], M.Div.   Board Certified   [phone] - Office

## 2017-09-14 NOTE — PROGRESS NOTES
Problem: Mobility Impaired (Adult and Pediatric)  Goal: *Acute Goals and Plan of Care (Insert Text)  Physical Therapy Goals LT/ST  Initiated 9/12/2017 and to be accomplished within 3-5 day(s)  1. Patient will move from supine <> sit with S in prep for out of bed activity and change of position. 2. Patient will perform sit<> stand with S with LRAD in prep for transfers/ambulation. 3. Patient will transfer from bed <> chair with S with LRAD for time up in chair for completion of ADL activity. 4. Patient will ambulate 150 feet with LRAD/S for improved functional mobility/safe discharge. Outcome: Progressing Towards Goal  PHYSICAL THERAPY TREATMENT     Patient: Soumya Wright (63 y.o. female)  Date: 9/14/2017  Diagnosis: CHF (congestive heart failure) ([facility])  Hyperkalemia  Heart failure ([facility]) Diastolic congestive heart failure ([facility]       Precautions: Fall   Chart, physical therapy assessment, plan of care and goals were reviewed. ASSESSMENT:  Pt is less argumentative today but reports Bilateral knee pain during tranfers. Pt still requires postural assist to accomplish standing without severe posterior lean. Pt willing to sit in chair post ambulation trial. Placement still appropriate to reduce fall risk when returned to [facility]. Progression toward goals:  [ ]      Improving appropriately and progressing toward goals  [X]      Improving slowly and progressing toward goals  [ ]      Not making progress toward goals and plan of care will be adjusted       PLAN:  Patient continues to benefit from skilled intervention to address the above impairments. Continue treatment per established plan of care.   Discharge Recommendations:  Abdelrahman Zelaya or To Be Determined  Further Equipment Recommendations for Discharge:  bedside commode and rolling walker       SUBJECTIVE:   Patient stated I was walking around all morning, talk to the . (Confused)      OBJECTIVE DATA SUMMARY:   Critical Behavior:  Neurologic State: Alert  Orientation Level: Disoriented to situation, Disoriented to time, Oriented to person, Oriented to place (fluctuate )  Cognition: Follows commands, Impaired decision making, Memory loss, Poor safety awareness  Safety/Judgement: Insight into deficits  Functional Mobility Training:  Bed Mobility:  Supine to Sit: Minimum assistance  Sit to Supine: Minimum assistance  Scooting: Minimum assistance  Transfers:  Sit to Stand: Minimum assistance  Stand to Sit: Minimum assistance  Balance:  Sitting: Intact  Standing: Impaired;Pull to stand; With support  Ambulation/Gait Training:  Distance (ft): 80 Feet (ft)  Assistive Device: Gait belt;Walker, rolling  Ambulation - Level of Assistance: Minimal assistance  Gait Abnormalities: Decreased step clearance  Base of Support: Narrowed  Stance: Time  Speed/Rhona: Slow  Step Length: Left shortened;Right shortened  Pain:  Pain Scale 1: Numeric (0 - 10)  Pain Intensity 1: 0  Activity Tolerance:   Fair  Please refer to the flowsheet for vital signs taken during this treatment.   After treatment:   [ ] Patient left in no apparent distress sitting up in chair  [X] Patient left in no apparent distress in bed  [X] Call bell left within reach  [X] Nursing notified  [ ] Caregiver present  [ ] Bed alarm activated      Katrina Howard PTA   Time Calculation: 27 mins

## 2017-09-14 NOTE — PROGRESS NOTES
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 Breathing better     Subjective:   PT does not have any somatic complaints           Review of Systems  Negative for Edema, Chest pain, Tremors, Nausea, vomiting  Has difficult swallowing        Blood pressure 155/49, pulse (!) 107, temperature 98.4 °F (36.9 °C), resp. rate 18, height 5' (1.524 m), weight 81.4 kg (179 lb 7.3 oz), SpO2 95 %.       awake and alert   NAD      Intake/Output Summary (Last 24 hours) at 09/14/17 1051  Last data filed at 09/14/17 0630   Gross per 24 hour   Intake               25 ml   Output             2575 ml   Net            -2550 ml      Recent Labs      09/12/17   0546   WBC  21.5*     Lab Results   Component Value Date/Time    Sodium 136 09/14/2017 06:18 AM    Potassium 3.0 09/14/2017 06:18 AM    Chloride 97 09/14/2017 06:18 AM    CO2 34 09/14/2017 06:18 AM    Anion gap 5 09/14/2017 06:18 AM    Glucose 208 09/14/2017 06:18 AM    BUN 68 09/14/2017 06:18 AM    Creatinine 1.92 09/14/2017 06:18 AM    BUN/Creatinine ratio 35 09/14/2017 06:18 AM    GFR est AA 30 09/14/2017 06:18 AM    GFR est non-AA 25 09/14/2017 06:18 AM    Calcium 10.1 09/14/2017 06:18 AM        Current Facility-Administered Medications   Medication Dose Route Frequency Provider Last Rate Last Dose    furosemide (LASIX) injection 40 mg  40 mg IntraVENous Q12H Anni Alex, DO   40 mg at 09/14/17 1010    pyridoxine (vitamin B6) (VITAMIN B-6) tablet 25 mg  25 mg Oral DAILY Anni Alex, DO   25 mg at 09/14/17 0923    insulin glargine (LANTUS) injection 55 Units  55 Units SubCUTAneous DAILY Earlene Duong MD   55 Units at 09/14/17 0905    predniSONE (DELTASONE) tablet 40 mg  40 mg Oral DAILY WITH BREAKFAST Earlene Duong MD   40 mg at 09/14/17 2425    mirtazapine (REMERON SOL-TAB) disintegrating tablet 30 mg  30 mg Oral QHS Keith Seo MD   30 mg at 09/13/17 2148    acetaminophen (TYLENOL) tablet 650 mg  650 mg Oral Q4H PRN Keith Seo MD   650 mg at 09/12/17 1512    amLODIPine (NORVASC) tablet 10 mg  10 mg Oral DAILY Keith Seo MD   10 mg at 09/14/17 0757    aspirin delayed-release tablet 81 mg  81 mg Oral DAILY Keith Seo MD   81 mg at 09/14/17 4630    calcium carbonate (TUMS) chewable tablet 400 mg [elemental]  400 mg Oral TID Keith Seo MD   400 mg at 09/14/17 6005    cholecalciferol (VITAMIN D3) tablet 1,000 Units  1,000 Units Oral DAILY Keith Seo MD   1,000 Units at 09/14/17 1309    cloNIDine HCl (CATAPRES) tablet 0.2 mg  0.2 mg Oral BID Keith Seo MD   0.2 mg at 09/14/17 8764    clopidogrel (PLAVIX) tablet 75 mg  75 mg Oral DAILY Keith Seo MD   75 mg at 09/14/17 1718    clotrimazole-betamethasone (LOTRISONE) 1-0.05 % cream   Topical BID Keith Seo MD        diphenhydrAMINE (BENADRYL) capsule 25 mg  25 mg Oral Q6H PRN Keith Seo MD        isosorbide mononitrate ER (IMDUR) tablet 30 mg  30 mg Oral BID Keith Seo MD   30 mg at 09/14/17 6479    levothyroxine (SYNTHROID) tablet 150 mcg  150 mcg Oral ACB Keith Seo MD   150 mcg at 09/14/17 0655    LORazepam (ATIVAN) tablet 0.5 mg  0.5 mg Oral Q8H PRN Keith Seo MD   0.5 mg at 09/11/17 2241    magnesium oxide (MAG-OX) tablet 400 mg  400 mg Oral DAILY Thierry Yeboah MD   400 mg at 09/14/17 5065    meclizine (ANTIVERT) tablet 12.5 mg  12.5 mg Oral Q6H PRN Thierry Yeboah MD   12.5 mg at 09/13/17 1720    metoprolol tartrate (LOPRESSOR) tablet 25 mg  25 mg Oral BID Thierry Yeboah MD   25 mg at 09/14/17 4718    nitroglycerin (NITROSTAT) tablet 0.4 mg  0.4 mg SubLINGual PRN Thierry Yeboah MD        pantoprazole (PROTONIX) tablet 40 mg  40 mg Oral DAILY Thierry Yeboah MD   40 mg at 09/14/17 1819    pitavastatin calcium (LIVALO) tablet 1 mg  1 mg Oral DAILY Thierry Yeboah MD   1 mg at 09/13/17 0900    polyethylene glycol (MIRALAX) packet 17 g  17 g Oral PRN Thierry Yeboah MD        promethazine (PHENERGAN) 6.25 mg/5 mL syrup 6.25 mg  6.25 mg Oral QID PRN Thierry Yeboah MD        traMADol Zollie Dusky) tablet 50 mg  50 mg Oral Q6H PRN Thierry Yeboah MD        naloxone [facility]) injection 0.4 mg  0.4 mg IntraVENous PRN Thierry Yeboah MD        heparin (porcine) injection 5,000 Units  5,000 Units SubCUTAneous Q8H Thierry Yeboah MD   5,000 Units at 09/14/17 9422    insulin lispro (HUMALOG) injection   SubCUTAneous AC&HS Thierry Yeboah MD   6 Units at 09/14/17 0730    glucose chewable tablet 16 g  4 Tab Oral PRN Thierry Yeboah MD        glucagon (GLUCAGEN) injection 1 mg  1 mg IntraMUSCular PRN Thierry Yeboah MD        dextrose (D50W) injection syrg 12.5-25 g  25-50 mL IntraVENous PRN Thierry Yeboah MD        albuterol-ipratropium (DUO-NEB) 2.5 MG-0.5 MG/3 ML  3 mL Nebulization Q4H RT Thierry Yeboah MD   3 mL at 09/14/17 0715    morphine injection 1 mg  1 mg IntraVENous Q4H PRN Thierry Yeboah MD   1 mg at 09/13/17 2153    melatonin tablet 5 mg  5 mg Oral QHS Thierry Yeboah MD   Stopped at 09/12/17 2200    chlorothiazide (DIURIL) 500 mg in 0.9% sodium chloride 50 mL IVPB  500 mg IntraVENous DAILY Anni Alex DO   500 mg at 09/13/17 1110    levoFLOXacin (LEVAQUIN) 500 mg in D5W IVPB  500 mg IntraVENous Q48H Anni Alex  mL/hr at 09/13/17 1103 500 mg at 09/13/17 1103    insulin lispro (HUMALOG) injection 10 Units  10 Units SubCUTAneous Vinny Sellers MD   10 Units at 09/14/17 3101

## 2017-09-15 ENCOUNTER — APPOINTMENT (OUTPATIENT)
Dept: GENERAL RADIOLOGY | Age: 82
DRG: 291 | End: 2017-09-15
Attending: HOSPITALIST
Payer: MEDICARE

## 2017-09-15 ENCOUNTER — APPOINTMENT (OUTPATIENT)
Dept: CT IMAGING | Age: 82
DRG: 291 | End: 2017-09-15
Attending: HOSPITALIST
Payer: MEDICARE

## 2017-09-15 PROBLEM — G93.40 ENCEPHALOPATHY ACUTE: Status: ACTIVE | Noted: 2017-09-15

## 2017-09-15 LAB
ALBUMIN SERPL-MCNC: 3.6 G/DL (ref 3.4–5)
ANION GAP SERPL CALC-SCNC: 6 MMOL/L (ref 3–18)
ARTERIAL PATENCY WRIST A: YES
BASE EXCESS BLD CALC-SCNC: 15 MMOL/L
BASOPHILS # BLD: 0 K/UL (ref 0–0.06)
BASOPHILS NFR BLD: 0 % (ref 0–2)
BDY SITE: ABNORMAL
BODY TEMPERATURE: 98.6
BUN SERPL-MCNC: 75 MG/DL (ref 7–18)
BUN/CREAT SERPL: 38 (ref 12–20)
CALCIUM SERPL-MCNC: 10.5 MG/DL (ref 8.5–10.1)
CHLORIDE SERPL-SCNC: 98 MMOL/L (ref 100–108)
CO2 SERPL-SCNC: 34 MMOL/L (ref 21–32)
CREAT SERPL-MCNC: 1.99 MG/DL (ref 0.6–1.3)
DIFFERENTIAL METHOD BLD: ABNORMAL
EOSINOPHIL # BLD: 0 K/UL (ref 0–0.4)
EOSINOPHIL NFR BLD: 0 % (ref 0–5)
ERYTHROCYTE [DISTWIDTH] IN BLOOD BY AUTOMATED COUNT: 18.3 % (ref 11.6–14.5)
GAS FLOW.O2 O2 DELIVERY SYS: ABNORMAL L/MIN
GLUCOSE BLD STRIP.AUTO-MCNC: 102 MG/DL (ref 70–110)
GLUCOSE BLD STRIP.AUTO-MCNC: 140 MG/DL (ref 70–110)
GLUCOSE BLD STRIP.AUTO-MCNC: 162 MG/DL (ref 70–110)
GLUCOSE BLD STRIP.AUTO-MCNC: 184 MG/DL (ref 70–110)
GLUCOSE BLD STRIP.AUTO-MCNC: 94 MG/DL (ref 70–110)
GLUCOSE SERPL-MCNC: 135 MG/DL (ref 74–99)
HCO3 BLD-SCNC: 37.9 MMOL/L (ref 22–26)
HCT VFR BLD AUTO: 43.6 % (ref 35–45)
HGB BLD-MCNC: 14.5 G/DL (ref 12–16)
LYMPHOCYTES # BLD: 1.7 K/UL (ref 0.9–3.6)
LYMPHOCYTES NFR BLD: 8 % (ref 21–52)
MCH RBC QN AUTO: 24.5 PG (ref 24–34)
MCHC RBC AUTO-ENTMCNC: 33.3 G/DL (ref 31–37)
MCV RBC AUTO: 73.6 FL (ref 74–97)
MONOCYTES # BLD: 1.5 K/UL (ref 0.05–1.2)
MONOCYTES NFR BLD: 7 % (ref 3–10)
NEUTS SEG # BLD: 19.8 K/UL (ref 1.8–8)
NEUTS SEG NFR BLD: 85 % (ref 40–73)
O2/TOTAL GAS SETTING VFR VENT: 21 %
PCO2 BLD: 44.8 MMHG (ref 35–45)
PH BLD: 7.54 [PH] (ref 7.35–7.45)
PHOSPHATE SERPL-MCNC: 3.1 MG/DL (ref 2.5–4.9)
PLATELET # BLD AUTO: 608 K/UL (ref 135–420)
PMV BLD AUTO: 8.3 FL (ref 9.2–11.8)
PO2 BLD: 57 MMHG (ref 80–100)
POTASSIUM SERPL-SCNC: 3 MMOL/L (ref 3.5–5.5)
RBC # BLD AUTO: 5.92 M/UL (ref 4.2–5.3)
SAO2 % BLD: 92 % (ref 92–97)
SERVICE CMNT-IMP: ABNORMAL
SODIUM SERPL-SCNC: 138 MMOL/L (ref 136–145)
SPECIMEN TYPE: ABNORMAL
TOTAL RESP. RATE, ITRR: 16
WBC # BLD AUTO: 23.1 K/UL (ref 4.6–13.2)

## 2017-09-15 PROCEDURE — 74011250637 HC RX REV CODE- 250/637: Performed by: INTERNAL MEDICINE

## 2017-09-15 PROCEDURE — 80069 RENAL FUNCTION PANEL: CPT | Performed by: INTERNAL MEDICINE

## 2017-09-15 PROCEDURE — 74011636637 HC RX REV CODE- 636/637: Performed by: HOSPITALIST

## 2017-09-15 PROCEDURE — 74011000250 HC RX REV CODE- 250: Performed by: HOSPITALIST

## 2017-09-15 PROCEDURE — 74011250636 HC RX REV CODE- 250/636: Performed by: HOSPITALIST

## 2017-09-15 PROCEDURE — 74011250636 HC RX REV CODE- 250/636: Performed by: INTERNAL MEDICINE

## 2017-09-15 PROCEDURE — 65660000000 HC RM CCU STEPDOWN

## 2017-09-15 PROCEDURE — 74011000258 HC RX REV CODE- 258: Performed by: INTERNAL MEDICINE

## 2017-09-15 PROCEDURE — 92526 ORAL FUNCTION THERAPY: CPT

## 2017-09-15 PROCEDURE — 71010 XR CHEST PORT: CPT

## 2017-09-15 PROCEDURE — 82803 BLOOD GASES ANY COMBINATION: CPT

## 2017-09-15 PROCEDURE — 36600 WITHDRAWAL OF ARTERIAL BLOOD: CPT

## 2017-09-15 PROCEDURE — 82962 GLUCOSE BLOOD TEST: CPT

## 2017-09-15 PROCEDURE — 36415 COLL VENOUS BLD VENIPUNCTURE: CPT | Performed by: INTERNAL MEDICINE

## 2017-09-15 PROCEDURE — 85025 COMPLETE CBC W/AUTO DIFF WBC: CPT | Performed by: HOSPITALIST

## 2017-09-15 RX ORDER — INSULIN LISPRO 100 [IU]/ML
8 INJECTION, SOLUTION INTRAVENOUS; SUBCUTANEOUS
Status: DISCONTINUED | OUTPATIENT
Start: 2017-09-15 | End: 2017-09-19

## 2017-09-15 RX ORDER — METOPROLOL TARTRATE 5 MG/5ML
2.5 INJECTION INTRAVENOUS EVERY 4 HOURS
Status: DISCONTINUED | OUTPATIENT
Start: 2017-09-15 | End: 2017-09-17

## 2017-09-15 RX ORDER — INSULIN GLARGINE 100 [IU]/ML
50 INJECTION, SOLUTION SUBCUTANEOUS DAILY
Status: DISCONTINUED | OUTPATIENT
Start: 2017-09-16 | End: 2017-09-19

## 2017-09-15 RX ORDER — HALOPERIDOL 5 MG/ML
0.5 INJECTION INTRAMUSCULAR ONCE
Status: COMPLETED | OUTPATIENT
Start: 2017-09-15 | End: 2017-09-15

## 2017-09-15 RX ORDER — INSULIN LISPRO 100 [IU]/ML
INJECTION, SOLUTION INTRAVENOUS; SUBCUTANEOUS
Status: DISCONTINUED | OUTPATIENT
Start: 2017-09-15 | End: 2017-09-19

## 2017-09-15 RX ORDER — QUETIAPINE FUMARATE 25 MG/1
25 TABLET, FILM COATED ORAL
Status: DISCONTINUED | OUTPATIENT
Start: 2017-09-15 | End: 2017-09-19

## 2017-09-15 RX ORDER — LEVOFLOXACIN 250 MG/1
250 TABLET ORAL
Status: DISCONTINUED | OUTPATIENT
Start: 2017-09-15 | End: 2017-09-16 | Stop reason: DRUGHIGH

## 2017-09-15 RX ADMIN — HALOPERIDOL LACTATE 0.5 MG: 5 INJECTION, SOLUTION INTRAMUSCULAR at 10:58

## 2017-09-15 RX ADMIN — CHLOROTHIAZIDE SODIUM 500 MG: 500 INJECTION, POWDER, LYOPHILIZED, FOR SOLUTION INTRAVENOUS at 13:12

## 2017-09-15 RX ADMIN — INSULIN GLARGINE 55 UNITS: 100 INJECTION, SOLUTION SUBCUTANEOUS at 08:50

## 2017-09-15 RX ADMIN — METOPROLOL TARTRATE 2.5 MG: 5 INJECTION INTRAVENOUS at 17:42

## 2017-09-15 RX ADMIN — INSULIN LISPRO 2 UNITS: 100 INJECTION, SOLUTION INTRAVENOUS; SUBCUTANEOUS at 16:59

## 2017-09-15 RX ADMIN — FUROSEMIDE 40 MG: 10 INJECTION, SOLUTION INTRAMUSCULAR; INTRAVENOUS at 11:26

## 2017-09-15 RX ADMIN — METOPROLOL TARTRATE 2.5 MG: 5 INJECTION INTRAVENOUS at 13:11

## 2017-09-15 RX ADMIN — INSULIN LISPRO 10 UNITS: 100 INJECTION, SOLUTION INTRAVENOUS; SUBCUTANEOUS at 08:54

## 2017-09-15 NOTE — PROGRESS NOTES
Problem: Dysphagia (Adult)  Goal: *Acute Goals and Plan of Care (Insert Text)  Recommendations:  Diet: Pureed Solids with Thin Liquids   Meds: 1 at a time in puree; crushed if appropriate  Aspiration Precautions  Oral Care TID  Other: breath hold, small sip, and swallow hard    Goals: Patient will:  1. Tolerate PO trials with 0 s/s overt distress in 4/5 trials  2. Utilize compensatory swallow strategies/maneuvers (decrease bite/sip, size/rate, alt. liq/sol) with min cues in 4/5 trials  3. Perform oral-motor/laryngeal exercises to increase oropharyngeal swallow function with min cues  4. Complete an objective swallow study (i.e., MBSS) to assess swallow integrity, r/o aspiration, and determine of safest LRD, min A - Goal met 9/13/2017     Outcome: Progressing Towards Goal  SPEECH LANGUAGE PATHOLOGY DYSPHAGIA TREATMENT     Patient: Paresh Hooks (29 y.o. female)  Date: 9/15/2017  Diagnosis: CHF (congestive heart failure) (HCC)  Hyperkalemia  Heart failure (HCC) Diastolic congestive heart failure (HCC)       Precautions: Aspiration. Fall      ASSESSMENT:  Moderate oral phase and mild/moderate pharyngeal phase dysphagia. Patient confused, alert, and Ox4 during dysphagia therapy. New onset of AMS; thus maxA for instruction of swallowing exercises. Tolerated 10/10 sips of thin liquids with min fading to no cues for compensatory strategies. ID water with straws and donut at bedside; education re: diet recommendations and aspiration risk provided. No evidence of learning. See below. Progression toward goals:  [ ]         Improving appropriately and progressing toward goals  [X]         Improving slowly and progressing toward goals  [ ]         Not making progress toward goals and plan of care will be adjusted       PLAN:  Recommendations and Planned Interventions:  As above. Patient continues to benefit from skilled intervention to address the above impairments.  Continue treatment per established plan of care.  Discharge Recommendations:  Skilled Nursing Facility       SUBJECTIVE:   Patient stated I like the yellow pink green over there. OBJECTIVE:   Cognitive and Communication Status:  Neurologic State: Confused  Orientation Level: Oriented to person  Cognition: Impaired decision making, Decreased command following, Decreased attention/concentration, Poor safety awareness, Memory loss, Impulsive  Perception: Appears intact  Perseveration: No perseveration noted  Safety/Judgement: Decreased insight into deficits  Dysphagia Treatment:  Oral Assessment:  Oral Assessment  Labial: No impairment  Dentition: Upper & lower dentures  Oral Hygiene: good  Lingual: Decreased rate, Decreased strength, Incoordinated  Velum: No impairment  Mandible: Restricted  Gag Reflex: Present  P.O. Trials:              Patient Position: 50HOB              Vocal quality prior to P.O.: No impairment              Consistency Presented:  Thin liquid              How Presented: Cup/sip, Self-fed/presented              How Much: 10              Bolus Acceptance: No impairment              Bolus Formation/Control: No impairment              Type of Impairment: Delayed              Propulsion: No impairment              Oral Residue: None              Initiation of Swallow: No impairment              Laryngeal Elevation: Functional              Aspiration Signs/Symptoms: None              Pharyngeal Phase Characteristics: Poor endurance              Effective Modifications: Supraglottic swallow, Small sips and bites, Cup/sip              Cues for Modifications: Minimal              Oral Phase Severity: Moderate              Pharyngeal Phase Severity : Mild-moderate              Oral Motor Exercises:              Lingual Resistance: Yes              Sets : 2              Reps : 5                        Exercises:  Laryngeal Exercises:  Effortful Swallow: Yes  Sets : 2  Reps : 5  Supraglottic Swallow: Yes  Sets : 2  Reps : 5     PAIN:  Start of Tx: 0  End of Tx: 0      After treatment:   [ ]              Patient left in no apparent distress sitting up in chair  [X]              Patient left in no apparent distress in bed  [X]              Call bell left within reach  [ ]              Nursing notified  [ ]              Family present  [ ]              Caregiver present  [ ]              Bed alarm activated         COMMUNICATION/EDUCATION:   [X]        Aspiration precautions; swallow safety; compensatory techniques  [ ]        Patient unable to participate in education; education ongoing with staff  [X]        Posted safety precautions in patient's room.   [X]        Oral-motor/laryngeal strengthening exercises        Dea Barragan V, SLP  Time Calculation: 15 mins

## 2017-09-15 NOTE — PROGRESS NOTES
Assessment:      CHF (congestive heart failure) ([facility] (7/16/2016)            PAM (acute kidney injury) ([facility] (7/17/2016)        DM (diabetes mellitus) ([facility] (7/17/2016)        CKD (chronic kidney disease) stage 4, GFR 15-29 ml/min ([facility]) (3/12/2017)                Stridor (9/11/2017)        Localized cancer of lip, oral cavity and throat (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 (9/11/2017)         UTI   Visual halluncinatioan 2nd to drugs possibly Prednisone vs Levo   Hallucination      Plan:   Restart levo at lower dose  Once eating her K should get better        CC: CHF  Interval History: Has hallucination  Breathing better      Subjective:   PT does not have any somatic complaints              Review of Systems  Negative for Edema, Chest pain, Tremors, Nausea, vomiting  Has difficult swallowing              Review of Systems  Negative for Edema, SOB, Chest pain, Tremors, Nausea, vomiting        Blood pressure (!) 159/94, pulse 96, temperature 99.6 °F (37.6 °C), resp. rate 18, height 5' (1.524 m), weight 84.5 kg (186 lb 4.6 oz), SpO2 95 %. awake and alert   NAD        Intake/Output Summary (Last 24 hours) at 09/15/17 1802  Last data filed at 09/14/17 2250   Gross per 24 hour   Intake              590 ml   Output                0 ml   Net              590 ml      No results for input(s): WBC in the last 72 hours.     No lab exists for component: HEMOGLOBIN, PLATELET  Lab Results   Component Value Date/Time    Sodium 138 09/15/2017 05:11 AM    Potassium 3.0 09/15/2017 05:11 AM    Chloride 98 09/15/2017 05:11 AM    CO2 34 09/15/2017 05:11 AM    Anion gap 6 09/15/2017 05:11 AM    Glucose 135 09/15/2017 05:11 AM    BUN 75 09/15/2017 05:11 AM    Creatinine 1.99 09/15/2017 05:11 AM    BUN/Creatinine ratio 38 09/15/2017 05:11 AM    GFR est AA 29 09/15/2017 05:11 AM    GFR est non-AA 24 09/15/2017 05:11 AM    Calcium 10.5 09/15/2017 05:11 AM        Current Facility-Administered Medications   Medication Dose Route Frequency Provider Last Rate Last Dose    levoFLOXacin (LEVAQUIN) tablet 250 mg  250 mg Oral Q48H Anni Crossi, DO        furosemide (LASIX) injection 40 mg  40 mg IntraVENous Q12H Anni R Isai, DO   40 mg at 09/14/17 2230    pyridoxine (vitamin B6) (VITAMIN B-6) tablet 25 mg  25 mg Oral DAILY Cristinemed R Isai, DO   25 mg at 09/14/17 0923    albuterol-ipratropium (DUO-NEB) 2.5 MG-0.5 MG/3 ML  3 mL Nebulization Q4H PRN Dori Vo MD        insulin glargine (LANTUS) injection 55 Units  55 Units SubCUTAneous DAILY Dori Vo MD   55 Units at 09/14/17 0905    predniSONE (DELTASONE) tablet 40 mg  40 mg Oral DAILY WITH BREAKFAST Dori Vo MD   40 mg at 09/14/17 6388    mirtazapine (REMERON SOL-TAB) disintegrating tablet 30 mg  30 mg Oral QHS Dotty Rodriges MD   30 mg at 09/14/17 2228    acetaminophen (TYLENOL) tablet 650 mg  650 mg Oral Q4H PRN Dotty Rodriges MD   650 mg at 09/12/17 0634    amLODIPine (NORVASC) tablet 10 mg  10 mg Oral DAILY Dotty Rodriges MD   10 mg at 09/14/17 0986    aspirin delayed-release tablet 81 mg  81 mg Oral DAILY Dotty Rodriges MD   81 mg at 09/14/17 2260    calcium carbonate (TUMS) chewable tablet 400 mg [elemental]  400 mg Oral TID Dotty Rodriges MD   400 mg at 09/13/17 1204    cholecalciferol (VITAMIN D3) tablet 1,000 Units  1,000 Units Oral DAILY Dotty Rodriges MD   1,000 Units at 09/14/17 2153    cloNIDine HCl (CATAPRES) tablet 0.2 mg  0.2 mg Oral BID Dotty Rodriges MD   0.2 mg at 09/14/17 2228    clopidogrel (PLAVIX) tablet 75 mg  75 mg Oral DAILY Dotty Rodriges MD   75 mg at 09/14/17 0109    clotrimazole-betamethasone (LOTRISONE) 1-0.05 % cream   Topical BID Dotty Rodriges MD        diphenhydrAMINE (BENADRYL) capsule 25 mg  25 mg Oral Q6H PRN Dotty Rodriges MD        isosorbide mononitrate ER (IMDUR) tablet 30 mg  30 mg Oral BID Princess Stone MD   Stopped at 09/14/17 2100    levothyroxine (SYNTHROID) tablet 150 mcg  150 mcg Oral ACB Princess Stone MD   150 mcg at 09/14/17 4166    LORazepam (ATIVAN) tablet 0.5 mg  0.5 mg Oral Q8H PRN Princess Stone MD   0.5 mg at 09/11/17 2241    magnesium oxide (MAG-OX) tablet 400 mg  400 mg Oral DAILY Princess Stone MD   400 mg at 09/14/17 7038    meclizine (ANTIVERT) tablet 12.5 mg  12.5 mg Oral Q6H PRN Princess Stone MD   12.5 mg at 09/13/17 1720    metoprolol tartrate (LOPRESSOR) tablet 25 mg  25 mg Oral BID Princess Stone MD   25 mg at 09/14/17 2228    nitroglycerin (NITROSTAT) tablet 0.4 mg  0.4 mg SubLINGual PRN Princess Stone MD        pantoprazole (PROTONIX) tablet 40 mg  40 mg Oral DAILY Princess Stone MD   40 mg at 09/14/17 1320    pitavastatin calcium (LIVALO) tablet 1 mg  1 mg Oral DAILY Princess Stone MD   1 mg at 09/13/17 0900    polyethylene glycol (MIRALAX) packet 17 g  17 g Oral PRN Princess Stone MD        promethazine (PHENERGAN) 6.25 mg/5 mL syrup 6.25 mg  6.25 mg Oral QID PRN Princess Stone MD        traMADol Sharlot Copper) tablet 50 mg  50 mg Oral Q6H PRN Princess Stone MD        naloxone [facility]) injection 0.4 mg  0.4 mg IntraVENous PRN Princess Stone MD        heparin (porcine) injection 5,000 Units  5,000 Units SubCUTAneous Q8H Princess Stone MD   5,000 Units at 09/14/17 1847    insulin lispro (HUMALOG) injection   SubCUTAneous AC&HS Princess Stone MD   Stopped at 09/14/17 1630    glucose chewable tablet 16 g  4 Tab Oral PRN Princess Stone MD        glucagon (GLUCAGEN) injection 1 mg  1 mg IntraMUSCular PRN Princess Stone MD        dextrose (D50W) injection syrg 12.5-25 g  25-50 mL IntraVENous PRN Princess Stone MD        morphine injection 1 mg  1 mg IntraVENous Q4H PRN Princess Stone, MD   1 mg at 09/13/17 2153    melatonin tablet 5 mg  5 mg Oral QHS Ester Jenkins MD   5 mg at 09/14/17 2228    chlorothiazide (DIURIL) 500 mg in 0.9% sodium chloride 50 mL IVPB  500 mg IntraVENous DAILY Anni AlexDO   500 mg at 09/14/17 1142    insulin lispro (HUMALOG) injection 10 Units  10 Units SubCUTAneous TIDAC Jose Carmona MD   Stopped at 09/14/17 1742       )Xr Chest Sngl V    Result Date: 9/4/2017  EXAM: AP radiograph of the chest INDICATION: Shortness of breath COMPARISON: March 10, 2017, July 16, 2016  FINDINGS: Normal heart size and mediastinal contour. No consolidation, pleural effusion, or pneumothorax. No acute osseous abnormalities. _  IMPRESSION: No acute pulmonary findings     Xr Knee Lt Min 4 V    Result Date: 8/1/2017  Indication: Fall, pain. Comments: AP, lateral, and bilateral oblique views of the left knee are submitted for evaluation. There is no evidence of fracture or dislocation. No joint effusion is identified. There is small superior and inferior patella enthesophytes. Minimal articular surface irregularity of the patella. Atherosclerotic calcifications noted. The soft tissue structures are unremarkable. Osseous mineralization is normal.     IMPRESSION: No evidence for acute fracture or dislocation. Mild degenerative changes and atherosclerosis. Xr Swallow Func Video    Result Date: 9/13/2017  Modified Barium swallow History: Dysphasia with cough, feeding difficulties. Technique: The patient orally ingested various barium materials under the direction of a speech pathologist with direct fluoroscopic observation. Total fluoroscopy time: 36 seconds; fluoroscopy dose: 4.37mGy reference in air kerma Findings: The patient ingested thin, pudding, and a barium coated pill consistencies. Laryngeal aspiration occurred with thin liquids administered by straw. The aspiration resulted in a cough reflex.  Penetration to the vocal cords was noted with thin liquids administered by both cup and straw. Premature spillage was noted with any consistency. Oropharyngeal phase was unremarkable. IMPRESSION: 1. Aspiration with cough reflex related to thin liquids administered by straw. Laryngeal penetration to the level of the vocal cords noted within the contents of the cervical and strut. Please refer to the separate report and recommendations from the speech pathologist.    Ct Head Wo Cont    Result Date: 8/1/2017  CT scan of the head without IV contrast Images: 154  HISTORY: Status post fall. TECHNIQUE: Serial axial images were obtained from the foramen magnum to the skull vertex without IV contrast. One or more dose reduction techniques were used on this CT: automated exposure control, adjustment of the mAs and/or kVp according to patient's size, and iterative reconstruction techniques. The specific techniques utilized on this CT exam have been documented in the patient's electronic medical record. COMPARISON:  None. FINDINGS: The sulci, ventricles, and basal cisterns are within normal limits for age with age concordant atrophy. There is no intracranial hemorrhage, mass-effect, or midline shift. No extra-axial fluid collections are identified. Periventricular white matter hypodensities are noted. These are nonspecific but often seen with chronic small vessel ischemic change. Otherwise, there are no significant areas of abnormal parenchymal attenuation. The visualized portions of the paranasal sinuses and mastoid air cells show no air-fluid levels. The visualized bones are intact. IMPRESSION: No evidence of acute traumatic injury to the brain or cranium. Atrophy and deep white matter changes are identified.                                                                                                                               Xr Chest Port    Result Date: 9/11/2017  EXAM: Chest portable INDICATION: Short of breath COMPARISON: Single view chest 9/3/2017  FINDINGS: AP portable chest film was performed. Suboptimal inspiration similar to previous exam. There are new bilaterally symmetrically increased interstitial markings. No focal infiltrate. No definite effusion. No pneumothorax. Heart is at the upper limits of normal. Mild central vascular congestion. IMPRESSION: 1. New symmetrically increased interstitial infiltrates. Differential includes acute onset interstitial pulmonary edema which is felt to be most likely. Acute interstitial pneumonitis is felt to be less likely. Xr Spine Lumb Trauma 2 V    Result Date: 8/1/2017  AP AND LATERAL LUMBAR SPINE: Indication:  Status post fall. Back pain. AP and cross table lateral views, total 4 films. Five non rib bearing lumbar vertebra. There is no evidence of compression fracture. Minimal anterolisthesis of L4 and L5 likely on degenerative basis. Moderate to marked loss of disc height at L2-3, through L5-S1. Marked to severe facet arthropathy at all levels. Marked aortic atherosclerosis. Bilateral hip arthropathy. IMPRESSION: 1. No compression fracture. 2. Multilevel spondylosis with moderate to marked degenerative disease and facet arthropathy. Nm Lung Perfusion W Vent    Result Date: 9/11/2017  Ventilation-perfusion lung scan History:  Shortness of breath, acute in onset. Comparison:  Portable chest earlier the same day Technique: Ventilation imaging was performed after inhalation of 0.8 millicuries of Tc 69J DTPA with a nebulizer followed by imaging in multiple projections. Perfusion imaging was performed after intravenous injection of 7.2 millicuries of Tc 79A MAA followed by imaging in multiple projections. Injection site: Right antecubital fossa vein Findings: Ventilation imaging shows no significant ventilation defects. Perfusion imaging shows mild inhomogeneity along the peripheral aspect of the lungs. No focal segmental perfusion defect is seen. No perfusion mismatches are present.      Impression: Low probability for pulmonary embolism without definite focal segmental perfusion defect.

## 2017-09-15 NOTE — ROUTINE PROCESS
Bedside and Verbal shift change report given to Prachi Velarde RN (oncoming nurse) by MARGE Pascal RN (offgoing nurse). Report included the following information SBAR, Kardex, Intake/Output, MAR and Recent Results.

## 2017-09-15 NOTE — ROUTINE PROCESS
Bedside and Verbal shift change report given to DESIRAE Fermin (oncoming nurse) by Blanca Chong (offgoing nurse). Report included the following information SBAR, Kardex, Intake/Output, MAR and Recent Results.

## 2017-09-15 NOTE — PROGRESS NOTES
Patient refusing POC glucose testing at this time and states she is not taking any medications. Oncoming RN aware that patient refusing.

## 2017-09-15 NOTE — PROGRESS NOTES
Hospitalist Progress Note-critical care note     Patient: Melinda Alamo MRN: [id number]  CSN: [id number]    YOB: 1932  Age: 80 y.o.   Sex: female    DOA: 9/10/2017 LOS:  LOS: 5 days            Chief complaint: chf exacerbation, pam on ckd, carcinoma, acute encephalopathy   Stridor, anxiety   Assessment/Plan         Hospital Problems  Date Reviewed: 9/11/2017          Codes Class Noted POA    Encephalopathy acute ICD-10-CM: G93.40  ICD-9-CM: 348.30  9/15/2017 Unknown        Dysphagia ICD-10-CM: R13.10  ICD-9-CM: 787.20  9/13/2017 Unknown        Stridor ICD-10-CM: R06.1  ICD-9-CM: 786.1  9/11/2017 Unknown        Localized cancer of lip, oral cavity and throat ([facility] ICD-10-CM: C14.8  ICD-9-CM: 149.8  9/11/2017 Yes    Overview Signed 9/11/2017  1:48 AM by Shea aDo MD     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 9/2017             Anxiety ICD-10-CM: F41.9  ICD-9-CM: 300.00  9/11/2017 Unknown        Heart failure ([facility] ICD-10-CM: I50.9  ICD-9-CM: 428.9  9/11/2017 Unknown        Acute renal failure superimposed on stage 4 chronic kidney disease ([facility] ICD-10-CM: N17.9, N18.4  ICD-9-CM: 584.9, 585.4  9/11/2017 Unknown        Hyperkalemia ICD-10-CM: E87.5  ICD-9-CM: 276.7  9/10/2017 Unknown        CKD (chronic kidney disease) stage 4, GFR 15-29 ml/min ([facility]) ICD-10-CM: N18.4  ICD-9-CM: 585.4  3/12/2017 Yes        PAM (acute kidney injury) ([facility] ICD-10-CM: N17.9  ICD-9-CM: 584.9  7/17/2016 Yes        DM (diabetes mellitus) ([facility] ICD-10-CM: E11.9  ICD-9-CM: 250.00  7/17/2016 Yes        * (Principal)Diastolic congestive heart failure (HCC) ICD-10-CM: I50.30  ICD-9-CM: 428.30, 428.0  7/16/2016 Unknown        Shortness of breath ICD-10-CM: R06.02  ICD-9-CM: 786.05  5/3/2015 Yes            1 acute encephalopathy    abg was ok   Will have ct   Will stop prednisone -might cause psychosis from po steroid   Give on dose haldol for calm her  Will avoid ativan Quetiapine at night   Hold d/c for today   Cbc , renal stable   On abx since admission     will increase nc O2 from abg report   cxr   2. CHF (congestive heart failure) (HCC)   Continue diuretics   Ef wnl, diastolic dysfunction    Will continue diuretics , will switch to po on discharge    And monitor electrolytes         3 PAM (acute kidney injury) on ckd4   Cr stable   Renal on board , cleared to be d/c per renal       4   DM (diabetes mellitus) ([facility] (7/17/2016)    Continue  lantus and premeal insulin. ssi       4  Hyperkalemia (9/10/2017)   resolved. 5. stridor   No airway problem    will continue iv steroid and abx -switch to po-stop due to ams -suspected po steroid induced psychosis   Speech  On board   6. 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 - will hold radiation per oncology     6. Anxiety (9/11/2017)  Continue ativan prn   7 Dysphagia ; dysohagia diet ,     Had rehab bed, hold d/c today due to ams     Subjective: you are santana, I know you   Nurse :pulled out of iv     Will d/c to rehab tomorrow if found bed     35 total min's spent on patient care including >50% on counseling/coordinating care. Review of systems:    General: No fevers or chills. Cardiovascular: No chest pain or pressure. No palpitations. Pulmonary:  shortness of breath is better   Gastrointestinal: No nausea, vomiting. Vital signs/Intake and Output:  Visit Vitals    BP (!) 159/94 (BP 1 Location: Right leg, BP Patient Position: At rest;Supine; Head of bed elevated (Comment degrees))    Pulse 96    Temp 99.6 °F (37.6 °C)    Resp 18    Ht 5' (1.524 m)    Wt 84.5 kg (186 lb 4.6 oz)    SpO2 95%    BMI 36.38 kg/m2     Current Shift:     Last three shifts:  09/13 1901 - 09/15 0700  In: 590 [P.O.:540; I.V.:50]  Out: 7384 [Urine:2575]    Physical Exam:  General: WD, WN.   Alert, cooperative, no acute distress    HEENT: NC, swelling and enlarged of chin   PERRLA, anicteric sclerae. Lungs: CTA Bilaterally. No Wheezing/Rhonchi/Rales. Heart:  Regular  rhythm,  No murmur, No Rubs, No Gallops  Abdomen: Soft, Non distended, Non tender.  +Bowel sounds,   Extremities: No c/c/e  Psych:   No anxious , no agitated. Neurologic:  No acute neurological deficit. Labs: Results:       Chemistry Recent Labs      09/15/17   0511  09/14/17   0618  09/13/17   0848   GLU  135*  208*  229*   NA  138  136  131*   K  3.0*  3.0*  3.3*   CL  98*  97*  91*   CO2  34*  34*  29   BUN  75*  68*  57*   CREA  1.99*  1.92*  1.83*   CA  10.5*  10.1  10.0   AGAP  6  5  11   BUCR  38*  35*  31*   ALB  3.6  3.3*  3.2*      CBC w/Diff No results for input(s): WBC, RBC, HGB, HCT, PLT, GRANS, LYMPH, EOS, HGBEXT, HCTEXT, PLTEXT, HGBEXT, HCTEXT, PLTEXT in the last 72 hours. Cardiac Enzymes No results for input(s): CPK, CKND1, DORITA in the last 72 hours. No lab exists for component: CKRMB, TROIP   Coagulation No results for input(s): PTP, INR, APTT in the last 72 hours. No lab exists for component: INREXT, INREXT    Lipid Panel Lab Results   Component Value Date/Time    Cholesterol, total 124 07/17/2016 02:21 AM    HDL Cholesterol 41 07/17/2016 02:21 AM    LDL, calculated 57.6 07/17/2016 02:21 AM    VLDL, calculated 25.4 07/17/2016 02:21 AM    Triglyceride 127 07/17/2016 02:21 AM    CHOL/HDL Ratio 3.0 07/17/2016 02:21 AM      BNP No results for input(s): BNPP in the last 72 hours.    Liver Enzymes Recent Labs      09/15/17   0511   ALB  3.6      Thyroid Studies Lab Results   Component Value Date/Time    TSH 1.92 03/11/2017 04:41 AM        Procedures/imaging: see electronic medical records for all procedures/Xrays and details which were not copied into this note but were reviewed prior to creation of Sowmya Granado MD

## 2017-09-15 NOTE — PROGRESS NOTES
0876 Assumed care of patient from Janet Perez 8141. [address]. Patient confused, alert to self, no signs of distress, bed alarm activated, will continue to monitor. 1015 Hold po prednisone this morning per Dr Courtney Polanco.    1038 Patient more confused, delirium present and agitated this morning then yesterday morning. Refusing to take all po medications, removed PIV's placed by night shift nurse. Patient refusing to receive a new PIV at this time for lasix and Livalo. Dr Aquino Cancer on unit, made aware, no new orders received, md to enter new orders. Will continue to monitor and encourage patient to take po medications and attempt to obtain PIV access. 1130 Patient refusing to wear O2 NC 2L applied for O2 sats 92% on room air. Respiratory therapy performed ABG's per md request, results available in paper chart. Potassium today 3.0, no replacement ordered at this time. 1600 Patient still refusing po medications and O2 NC, will continue to monitor. Chest xray completed. 1719 Patient not able to remain still for images for CT of head. Patient agitated, confused and yelling /requesting to return back to unit per CT tech. Will notify on call md.    1748 Dr Joaquín Gongora made aware patient refused CT of head. Will continue to monitor, will endorse to oncoming shift to attempt to obtain CT of head when patient is more lucid.

## 2017-09-15 NOTE — PROGRESS NOTES
Problem: Mobility Impaired (Adult and Pediatric)  Goal: *Acute Goals and Plan of Care (Insert Text)  Physical Therapy Goals LT/ST  Initiated 9/12/2017 and to be accomplished within 3-5 day(s)  1. Patient will move from supine <> sit with S in prep for out of bed activity and change of position. 2. Patient will perform sit<> stand with S with LRAD in prep for transfers/ambulation. 3. Patient will transfer from bed <> chair with S with LRAD for time up in chair for completion of ADL activity. 4. Patient will ambulate 150 feet with LRAD/S for improved functional mobility/safe discharge. Outcome: Not Progressing Towards Goal  PT session held due to:  [ ]  Nausea/vomiting  [X]  RN Communication/ suggestion (increased confusion and agitation)  [ ]  Extreme Pain  [ ]  Dialysis treatment in progress. Will f/u tomorrow. Thank you.   Vee Noriega, PTA

## 2017-09-15 NOTE — PROGRESS NOTES
D/c plan anticipate Rehab when medically stable    Telephone call with daughter Rina Deluna to clarify patients plan. States she understands patient will need to go to rehab, to proceed with this. Informed her patient was not ready for d/c today however, [facility] would accept her tomorrow if she is medically stable to go on 09/16/2017  per hospitalist.   However, if patient is medically cleared to go RN must call report to [facility]  [phone]. Patient must be transported no later 1400. I f medically cleared to go milli Deluna will transport her over to th [facility]   there tomorrow. If she is not medically cleared  On 09/16/2017 Andreia Escalante at [address] [facility] states will not be able to accept her on Sunday and will  Have to reevaluate pt.   on Monday

## 2017-09-15 NOTE — PROGRESS NOTES
Shift Summary:  Patient with confusion during shift. Refusing some medications while consenting to take others. Rapid talking throughout shift regarding whatever she sees on TV, but with marked confusion. (stating that Heath Reardon is from [place] and that he kills people, stating that she is going to go into her kitchen and bake cookies for all of the servants here, pretending to eat something with a spoon although she has nothing in her hands and no food at bedside)  Patient given medications as ordered except for what she refused to take despite vigorous encouragement by this RN and other staff members. Patient did not sleep at all during shift. Patient also did not sleep the night of 9/14/17. Oncoming RN made aware. Possible changes to medication can be discussed in rounds. See flow sheet and MAR.

## 2017-09-15 NOTE — PROGRESS NOTES
D/c plan anticipate Rehab    Papi Degree from [address] [place] [address],[address] can accept patient today if she is medically cleared

## 2017-09-15 NOTE — PROGRESS NOTES
Phone: [phone]  Paging : [phone]      Hematology / Oncology Progress Note    Admit Date: 9/10/2017    Assessment:     Principal Problem:    Diastolic congestive heart failure ([facility] (7/16/2016)    Active Problems:    Shortness of breath (5/3/2015)      PAM (acute kidney injury) ([address] (7/17/2016)      DM (diabetes mellitus) ([facility] (7/17/2016)      CKD (chronic kidney disease) stage 4, GFR 15-29 ml/min ([facility]) (3/12/2017)      Hyperkalemia (9/10/2017)      Stridor (9/11/2017)      Localized cancer of lip, oral cavity and throat ([facility] (9/11/2017)      Overview: 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 99. Nephrology following. Diuresis. 2) CHF/HTN/Stridor- Prednisone, Lasix, Diuril diuresis. Norvasc, Catapress, Lopressor, Imdur  3) Anxiety- Ativan prn. Remeron. Oriented to name. Consider psych consultation. 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 On hold. Approx 3 weeks XRT remaining (Compliance issues per Dr. Jonnathan Briggs). 5) Leukocytosis- UTI and receiving Prednisone. 6) UTI. Urine Cx Pseudomonas aeruginosa. Empiric ABx Levaquin discontinued 9/14/2017.   7) Hypokalemia- 3.0. Supplemental KCL  8) Anemia- 9/12/2017 Hgb 10.4 g/dl. 9) DMT2- Humalog, Lantus. 10) HLP- Pitavastatin  11) Hypothyroidism- Synthroid  12) PTx/OTx      Review of Systems  GENERAL: Patient alert, awake and oriented times 1, able to communicate with full sentences and not in distress. Anxious. Thinks she's at her house, having a prolonged conversation by herself. Nonsensical.  HEENT: No change in vision, no earache, tinnitus, sore throat or sinus congestion. NECK: No pain or stiffness. + Rad/Onc treatment markings  PULMONARY: +shortness of breath and wheezing. Cardiovascular: no pnd or orthopnea, no CP  GASTROINTESTINAL: + Difficulty swallowing, No abdominal pain, nausea, vomiting or diarrhea, melena or bright red blood per rectum. GENITOURINARY: No urinary frequency, urgency, hesitancy or dysuria. MUSCULOSKELETAL: No joint or muscle pain, no back pain, no recent trauma. DERMATOLOGIC: No rash, no itching, no lesions. ENDOCRINE: No polyuria, polydipsia, no heat or cold intolerance. No recent change in weight. HEMATOLOGICAL: No anemia or easy bruising or bleeding. NEUROLOGIC: No headache, seizures, numbness, tingling or weakness. Objective:     Visit Vitals    /80 (BP 1 Location: Right leg, BP Patient Position: At rest)    Pulse 94    Temp 99 °F (37.2 °C)    Resp 19    Ht 5' (1.524 m)    Wt 84.5 kg (186 lb 4.6 oz)    SpO2 93%    BMI 36.38 kg/m2   . Date 17 - 09/15/17 0659 09/15/17 0700 - 17 0659   Shift 5094-8699 9291-3615 24 Hour Total 7800-0644 7658-7212 24 Hour Total   I  N  T  A  K  E   P.O. 480 60 540         P. O. 480 60 540       I.V.  (mL/kg/hr) 50  (0.1)  50  (0)         Volume (chlorothiazide (DIURIL) 500 mg in 0.9% sodium chloride 50 mL IVPB) 50  50       Shift Total  (mL/kg) 530  (6.5) 60  (0.7) 590  (7)      O  U  T  P  U  T   Urine  (mL/kg/hr)            Urine Occurrence(s)  1 x 1 x       Shift Total  (mL/kg)          60 590      Weight (kg) 81.4 84.5 84.5 84.5 84.5 84.5     Temp (24hrs), Av.8 °F (37.1 °C), Min:98.3 °F (36.8 °C), Max:99.6 °F (37.6 °C)     General:  Alert, disoriented, talking to herself. cooperative, appears stated age. Head: Normocephalic, without obvious abnormality, atraumatic. Eyes:  Conjunctivae/corneas clear. PERRL, EOMs intact. Nose: Nares normal. No drainage or sinus tenderness.  No stridor of upper airway    Neck: Supple, radiation changes across mandible and neck with radiation markers, Large submental mass 5 cm. .   Lungs:   Clear to auscultation bilaterally. Heart:  Regular rate and rhythm, S1, S2 normal.     Abdomen: Obese, Soft, non-tender. Bowel sounds normal. anarsaca    Extremities: Extremities normal, atraumatic, no cyanosis plus 1-2+ pittting edema. Bilateral lower extremites    Pulses: 2+ and symmetric all extremities. Skin:  No rashes or lesions   Neurologic: AAOx1, No focal motor or sensory deficit     Imaging:       pCXR 9/10/2017-  New symmetrically increased interstitial infiltrates. Differential includes  acute onset interstitial pulmonary edema which is felt to be most likely. Acute  interstitial pneumonitis is felt to be less likely. V/Q Scan 9/11/2017-  Low probability for pulmonary embolism without definite focal segmental  perfusion defect. ECHO 9/11/2017-  Left ventricle: Systolic function was normal. Ejection fraction was estimated   to be 55 % in the range of 50 % to 60 %. Suboptimal endocardial visualization limits wall motion analysis. Mitral valve: There was mild to moderate annular calcification. Tricuspid valve: There was mild regurgitation. Pulmonary artery systolic   pressure was mildly increased. Pulmonary  artery systolic pressure: 30 mmHg.     Recent Results (from the past 24 hour(s))   GLUCOSE, POC    Collection Time: 09/14/17 10:54 AM   Result Value Ref Range    Glucose (POC) 167 (H) 70 - 110 mg/dL   GLUCOSE, POC    Collection Time: 09/14/17 12:14 PM   Result Value Ref Range    Glucose (POC) 139 (H) 70 - 110 mg/dL   GLUCOSE, POC    Collection Time: 09/14/17  5:11 PM   Result Value Ref Range    Glucose (POC) 93 70 - 110 mg/dL   GLUCOSE, POC    Collection Time: 09/14/17  8:49 PM   Result Value Ref Range    Glucose (POC) 119 (H) 70 - 110 mg/dL   RENAL FUNCTION PANEL    Collection Time: 09/15/17  5:11 AM   Result Value Ref Range    Sodium 138 136 - 145 mmol/L    Potassium 3.0 (L) 3.5 - 5.5 mmol/L Chloride 98 (L) 100 - 108 mmol/L    CO2 34 (H) 21 - 32 mmol/L    Anion gap 6 3.0 - 18 mmol/L    Glucose 135 (H) 74 - 99 mg/dL    BUN 75 (H) 7.0 - 18 MG/DL    Creatinine 1.99 (H) 0.6 - 1.3 MG/DL    BUN/Creatinine ratio 38 (H) 12 - 20      GFR est AA 29 (L) >60 ml/min/1.73m2    GFR est non-AA 24 (L) >60 ml/min/1.73m2    Calcium 10.5 (H) 8.5 - 10.1 MG/DL    Phosphorus 3.1 2.5 - 4.9 MG/DL    Albumin 3.6 3.4 - 5.0 g/dL       Current Facility-Administered Medications:     furosemide (LASIX) injection 40 mg, 40 mg, IntraVENous, Q12H, Anni Alex DO, 40 mg at 09/14/17 2230    pyridoxine (vitamin B6) (VITAMIN B-6) tablet 25 mg, 25 mg, Oral, DAILY, Anni Alex DO, 25 mg at 09/14/17 0923    albuterol-ipratropium (DUO-NEB) 2.5 MG-0.5 MG/3 ML, 3 mL, Nebulization, Q4H PRN, Fifi Higgins MD    insulin glargine (LANTUS) injection 55 Units, 55 Units, SubCUTAneous, DAILY, Fifi Higgins MD, 55 Units at 09/14/17 0905    predniSONE (DELTASONE) tablet 40 mg, 40 mg, Oral, DAILY WITH BREAKFAST, Fifi Higgins MD, 40 mg at 09/14/17 9073    mirtazapine (REMERON SOL-TAB) disintegrating tablet 30 mg, 30 mg, Oral, QHS, Viktor Gardner MD, 30 mg at 09/14/17 2228    acetaminophen (TYLENOL) tablet 650 mg, 650 mg, Oral, Q4H PRN, Viktor Gardner MD, 650 mg at 09/12/17 0634    amLODIPine (NORVASC) tablet 10 mg, 10 mg, Oral, DAILY, Viktor Gardner MD, 10 mg at 09/14/17 8197    aspirin delayed-release tablet 81 mg, 81 mg, Oral, DAILY, Viktor Gardner MD, 81 mg at 09/14/17 6513    calcium carbonate (TUMS) chewable tablet 400 mg [elemental], 400 mg, Oral, TID, Viktor Gardner MD, 400 mg at 09/13/17 1204    cholecalciferol (VITAMIN D3) tablet 1,000 Units, 1,000 Units, Oral, DAILY, Viktor Gardner MD, 1,000 Units at 09/14/17 9811    cloNIDine HCl (CATAPRES) tablet 0.2 mg, 0.2 mg, Oral, BID, Viktor Gardner MD, 0.2 mg at 09/14/17 2228    clopidogrel (PLAVIX) tablet 75 mg, 75 mg, Oral, DAILY, Rowdy Shaw, MD, 75 mg at 09/14/17 0924    clotrimazole-betamethasone (LOTRISONE) 1-0.05 % cream, , Topical, BID, Jenise Lozano MD    diphenhydrAMINE (BENADRYL) capsule 25 mg, 25 mg, Oral, Q6H PRN, Jenise Lozano MD    isosorbide mononitrate ER (IMDUR) tablet 30 mg, 30 mg, Oral, BID, Jenise Lozano MD, Stopped at 09/14/17 2100    levothyroxine (SYNTHROID) tablet 150 mcg, 150 mcg, Oral, ACB, Jenise Lozano MD, 150 mcg at 09/14/17 0655    LORazepam (ATIVAN) tablet 0.5 mg, 0.5 mg, Oral, Q8H PRN, Jenise Lozano MD, 0.5 mg at 09/11/17 2241    magnesium oxide (MAG-OX) tablet 400 mg, 400 mg, Oral, DAILY, Jenise Lozano MD, 400 mg at 09/14/17 9952    meclizine (ANTIVERT) tablet 12.5 mg, 12.5 mg, Oral, Q6H PRN, Jenise Lozano MD, 12.5 mg at 09/13/17 1720    metoprolol tartrate (LOPRESSOR) tablet 25 mg, 25 mg, Oral, BID, Jenise Lozano MD, 25 mg at 09/14/17 2228    nitroglycerin (NITROSTAT) tablet 0.4 mg, 0.4 mg, SubLINGual, PRN, Jenise Lozano MD    pantoprazole (PROTONIX) tablet 40 mg, 40 mg, Oral, DAILY, Jenise Lozano MD, 40 mg at 09/14/17 0924    pitavastatin calcium (LIVALO) tablet 1 mg, 1 mg, Oral, DAILY, Jenise Lozano MD, 1 mg at 09/13/17 0900    polyethylene glycol (MIRALAX) packet 17 g, 17 g, Oral, PRN, Jenise Lozano MD    promethazine (PHENERGAN) 6.25 mg/5 mL syrup 6.25 mg, 6.25 mg, Oral, QID PRN, Jenise Lozano MD    traMADol (ULTRAM) tablet 50 mg, 50 mg, Oral, Q6H PRN, Jenise Lozano MD    naloxone [facility]) injection 0.4 mg, 0.4 mg, IntraVENous, PRN, Jenise Lozano MD    heparin (porcine) injection 5,000 Units, 5,000 Units, SubCUTAneous, Q8H, Jenise Lozano MD, 5,000 Units at 09/14/17 1847    insulin lispro (HUMALOG) injection, , SubCUTAneous, AC&HS, Jenise Lozano MD, Stopped at 09/14/17 1630    glucose chewable tablet 16 g, 4 Tab, Oral, PRN, Jenise Lozano MD    glucagon (GLUCAGEN) injection 1 mg, 1 mg, IntraMUSCular, PRN, Matilda Vickers MD    dextrose (D50W) injection syrg 12.5-25 g, 25-50 mL, IntraVENous, PRN, Matilda Vickers MD    morphine injection 1 mg, 1 mg, IntraVENous, Q4H PRN, Matilda Vickers MD, 1 mg at 09/13/17 2153    melatonin tablet 5 mg, 5 mg, Oral, QHS, Matilda Vickers MD, 5 mg at 09/14/17 2228    chlorothiazide (DIURIL) 500 mg in 0.9% sodium chloride 50 mL IVPB, 500 mg, IntraVENous, DAILY, Anni Alex DO, 500 mg at 09/14/17 1142    insulin lispro (HUMALOG) injection 10 Units, 10 Units, SubCUTAneous, TIDAC, Tena Stewart MD, Stopped at 09/14/17 1742    Danna Pathak MD

## 2017-09-15 NOTE — PROGRESS NOTES
D/c plan: anticipate Rehab when medically cleared    [facility] informed cm they could accept patient today however: Dr. Kimmie Scales hospitalist does not feel patient is ready for discharge today due to increase confusion.      Attempted to contact her daughter Vikas Benton to update her however, had to leave a message    Care manager will continue to follow

## 2017-09-15 NOTE — CDMP QUERY
Please clarify if this patient is being treated/managed for:    =>Encephalopathy in setting of confusion   =>Other Explanation of clinical findings  =>Unable to Determine (no explanation of clinical findings)    The medical record reflects the following:    Risk:  age /medication     Clinical Indicators: documented by nurses and MD of hallucinations  and also documented by nurse of patient with confusion and delirium . Possibly secondary to prednisone vs Levo. Treatment:  discontinue drug    Please clarify and document your clinical opinion in the progress notes and discharge summary including the definitive and/or presumptive diagnosis, (suspected or probable), related to the above clinical findings. Please include clinical findings supporting your diagnosis. If you DECLINE this query or would like to communicate with [facility], please utilize the \"[employer] message box\" at the TOP of the Progress Note on the right.       Thank you,    Marion Del Cid RN [facility] [phone]

## 2017-09-16 ENCOUNTER — APPOINTMENT (OUTPATIENT)
Dept: CT IMAGING | Age: 82
DRG: 291 | End: 2017-09-16
Attending: HOSPITALIST
Payer: MEDICARE

## 2017-09-16 ENCOUNTER — APPOINTMENT (OUTPATIENT)
Dept: GENERAL RADIOLOGY | Age: 82
DRG: 291 | End: 2017-09-16
Attending: FAMILY MEDICINE
Payer: MEDICARE

## 2017-09-16 LAB
ALBUMIN SERPL-MCNC: 4 G/DL (ref 3.4–5)
ANION GAP SERPL CALC-SCNC: 12 MMOL/L (ref 3–18)
ATRIAL RATE: 61 BPM
BUN SERPL-MCNC: 71 MG/DL (ref 7–18)
BUN/CREAT SERPL: 40 (ref 12–20)
CALCIUM SERPL-MCNC: 11.1 MG/DL (ref 8.5–10.1)
CALCULATED P AXIS, ECG09: 34 DEGREES
CALCULATED R AXIS, ECG10: -22 DEGREES
CALCULATED T AXIS, ECG11: 84 DEGREES
CHLORIDE SERPL-SCNC: 97 MMOL/L (ref 100–108)
CO2 SERPL-SCNC: 33 MMOL/L (ref 21–32)
CREAT SERPL-MCNC: 1.79 MG/DL (ref 0.6–1.3)
DIAGNOSIS, 93000: NORMAL
GLUCOSE BLD STRIP.AUTO-MCNC: 126 MG/DL (ref 70–110)
GLUCOSE BLD STRIP.AUTO-MCNC: 163 MG/DL (ref 70–110)
GLUCOSE BLD STRIP.AUTO-MCNC: 163 MG/DL (ref 70–110)
GLUCOSE BLD STRIP.AUTO-MCNC: 224 MG/DL (ref 70–110)
GLUCOSE SERPL-MCNC: 98 MG/DL (ref 74–99)
P-R INTERVAL, ECG05: 206 MS
PHOSPHATE SERPL-MCNC: 3.5 MG/DL (ref 2.5–4.9)
POTASSIUM SERPL-SCNC: 3.4 MMOL/L (ref 3.5–5.5)
Q-T INTERVAL, ECG07: 412 MS
QRS DURATION, ECG06: 96 MS
QTC CALCULATION (BEZET), ECG08: 414 MS
SODIUM SERPL-SCNC: 142 MMOL/L (ref 136–145)
VENTRICULAR RATE, ECG03: 61 BPM

## 2017-09-16 PROCEDURE — 65660000000 HC RM CCU STEPDOWN

## 2017-09-16 PROCEDURE — 74011250637 HC RX REV CODE- 250/637: Performed by: INTERNAL MEDICINE

## 2017-09-16 PROCEDURE — 74011250636 HC RX REV CODE- 250/636: Performed by: INTERNAL MEDICINE

## 2017-09-16 PROCEDURE — 74011000250 HC RX REV CODE- 250: Performed by: HOSPITALIST

## 2017-09-16 PROCEDURE — 74011250637 HC RX REV CODE- 250/637: Performed by: HOSPITALIST

## 2017-09-16 PROCEDURE — 97116 GAIT TRAINING THERAPY: CPT

## 2017-09-16 PROCEDURE — 36415 COLL VENOUS BLD VENIPUNCTURE: CPT | Performed by: INTERNAL MEDICINE

## 2017-09-16 PROCEDURE — 74011000258 HC RX REV CODE- 258: Performed by: INTERNAL MEDICINE

## 2017-09-16 PROCEDURE — 74011636637 HC RX REV CODE- 636/637: Performed by: HOSPITALIST

## 2017-09-16 PROCEDURE — 80069 RENAL FUNCTION PANEL: CPT | Performed by: INTERNAL MEDICINE

## 2017-09-16 PROCEDURE — 74011250636 HC RX REV CODE- 250/636: Performed by: FAMILY MEDICINE

## 2017-09-16 PROCEDURE — 74000 XR ABD (KUB): CPT

## 2017-09-16 PROCEDURE — 82962 GLUCOSE BLOOD TEST: CPT

## 2017-09-16 PROCEDURE — 97530 THERAPEUTIC ACTIVITIES: CPT

## 2017-09-16 RX ORDER — ONDANSETRON 2 MG/ML
4 INJECTION INTRAMUSCULAR; INTRAVENOUS
Status: DISCONTINUED | OUTPATIENT
Start: 2017-09-16 | End: 2017-09-19

## 2017-09-16 RX ORDER — LEVOFLOXACIN 250 MG/1
250 TABLET ORAL EVERY 24 HOURS
Status: DISCONTINUED | OUTPATIENT
Start: 2017-09-16 | End: 2017-09-19

## 2017-09-16 RX ADMIN — INSULIN LISPRO 2 UNITS: 100 INJECTION, SOLUTION INTRAVENOUS; SUBCUTANEOUS at 07:46

## 2017-09-16 RX ADMIN — FUROSEMIDE 40 MG: 10 INJECTION, SOLUTION INTRAMUSCULAR; INTRAVENOUS at 22:21

## 2017-09-16 RX ADMIN — HEPARIN SODIUM 5000 UNITS: 5000 INJECTION, SOLUTION INTRAVENOUS; SUBCUTANEOUS at 10:24

## 2017-09-16 RX ADMIN — HEPARIN SODIUM 5000 UNITS: 5000 INJECTION, SOLUTION INTRAVENOUS; SUBCUTANEOUS at 18:26

## 2017-09-16 RX ADMIN — MECLIZINE 12.5 MG: 12.5 TABLET ORAL at 17:51

## 2017-09-16 RX ADMIN — INSULIN LISPRO 4 UNITS: 100 INJECTION, SOLUTION INTRAVENOUS; SUBCUTANEOUS at 12:51

## 2017-09-16 RX ADMIN — ONDANSETRON 4 MG: 2 INJECTION INTRAMUSCULAR; INTRAVENOUS at 10:51

## 2017-09-16 RX ADMIN — METOPROLOL TARTRATE 2.5 MG: 5 INJECTION INTRAVENOUS at 15:42

## 2017-09-16 RX ADMIN — METOPROLOL TARTRATE 2.5 MG: 5 INJECTION INTRAVENOUS at 10:23

## 2017-09-16 RX ADMIN — FUROSEMIDE 40 MG: 10 INJECTION, SOLUTION INTRAMUSCULAR; INTRAVENOUS at 10:25

## 2017-09-16 RX ADMIN — INSULIN GLARGINE 50 UNITS: 100 INJECTION, SOLUTION SUBCUTANEOUS at 10:25

## 2017-09-16 RX ADMIN — CHLOROTHIAZIDE SODIUM 500 MG: 500 INJECTION, POWDER, LYOPHILIZED, FOR SOLUTION INTRAVENOUS at 10:22

## 2017-09-16 RX ADMIN — INSULIN LISPRO 2 UNITS: 100 INJECTION, SOLUTION INTRAVENOUS; SUBCUTANEOUS at 17:52

## 2017-09-16 RX ADMIN — METOPROLOL TARTRATE 2.5 MG: 5 INJECTION INTRAVENOUS at 22:30

## 2017-09-16 RX ADMIN — QUETIAPINE FUMARATE 25 MG: 25 TABLET, FILM COATED ORAL at 21:45

## 2017-09-16 RX ADMIN — CLONIDINE HYDROCHLORIDE 0.2 MG: 0.1 TABLET ORAL at 21:45

## 2017-09-16 RX ADMIN — ONDANSETRON 4 MG: 2 INJECTION INTRAMUSCULAR; INTRAVENOUS at 19:44

## 2017-09-16 NOTE — PROGRESS NOTES
Problem: Falls - Risk of  Goal: *Absence of Falls  Document Sarita Fall Risk and appropriate interventions in the flowsheet.    Outcome: Progressing Towards Goal  Fall Risk Interventions:  Mobility Interventions: Bed/chair exit alarm, Communicate number of staff needed for ambulation/transfer     Mentation Interventions: Adequate sleep, hydration, pain control, Bed/chair exit alarm, Door open when patient unattended, More frequent rounding, Reorient patient, Toileting rounds, Update white board     Medication Interventions: Bed/chair exit alarm, Patient to call before getting OOB     Elimination Interventions: Bed/chair exit alarm, Call light in reach, Patient to call for help with toileting needs, Toileting schedule/hourly rounds

## 2017-09-16 NOTE — PROGRESS NOTES
Problem: Mobility Impaired (Adult and Pediatric)  Goal: *Acute Goals and Plan of Care (Insert Text)  Physical Therapy Goals LT/ST  Initiated 9/12/2017 and to be accomplished within 3-5 day(s)  1. Patient will move from supine <> sit with S in prep for out of bed activity and change of position. 2. Patient will perform sit<> stand with S with LRAD in prep for transfers/ambulation. 3. Patient will transfer from bed <> chair with S with LRAD for time up in chair for completion of ADL activity. 4. Patient will ambulate 150 feet with LRAD/S for improved functional mobility/safe discharge. Outcome: Progressing Towards Goal  PHYSICAL THERAPY TREATMENT     Patient: Amanda Loja (46 y.o. female)  Date: 9/16/2017  Diagnosis: CHF (congestive heart failure) ([facility])  Hyperkalemia  Heart failure ([facility]) Diastolic congestive heart failure ([facility]       Precautions: Fall   Chart, physical therapy assessment, plan of care and goals were reviewed. ASSESSMENT:  Pt is very resistant to session and only willing to sit EOB, for sips of water. Pt provides no assist for 2 standing attempts. Pt is occasionally tearful, demanding to be put back in bed. Pt goes limp and needs max assist back to supine. Progression toward goals:  [ ]      Improving appropriately and progressing toward goals  [X]      Improving slowly and progressing toward goals  [ ]      Not making progress toward goals and plan of care will be adjusted       PLAN:  Patient continues to benefit from skilled intervention to address the above impairments. Continue treatment per established plan of care. Discharge Recommendations:  [place] or   Further Equipment Recommendations for Discharge:  bedside commode and rolling walker       SUBJECTIVE:   Patient stated I'm not doing therapy.       OBJECTIVE DATA SUMMARY:   Critical Behavior:  Neurologic State: Alert, Confused  Orientation Level: Disoriented to place, Disoriented to situation, Disoriented to time, Oriented to person  Cognition: Impaired decision making, Memory loss, Poor safety awareness  Safety/Judgement: Decreased insight into deficits  Functional Mobility Training:  Bed Mobility:  Supine to Sit: Moderate assistance;Maximum assistance  Sit to Supine: Maximum assistance  Scooting: Maximum assistance  Transfers:  Sit to Stand: Maximum assistance  Stand to Sit: Maximum assistance  Balance:  Sitting: Intact  Standing: Impaired; With support  Standing - Static: Poor  Standing - Dynamic : None  Pain:  Pain Scale 1: Visual  Pain Intensity 1: 0  Activity Tolerance:   Poor  Please refer to the flowsheet for vital signs taken during this treatment.   After treatment:   [ ] Patient left in no apparent distress sitting up in chair  [X] Patient left in no apparent distress in bed  [X] Call bell left within reach  [X] Nursing notified  [ ] Caregiver present  [ ] Bed alarm activated      Thu Pardo PTA   Time Calculation: 27 mins

## 2017-09-16 NOTE — PROGRESS NOTES
1542:  Patient refusing all oral medications. Administered IV metoprolol. Patient starring at ceiling and rambling incomprehensible words. Asked patient if she was having any pain. Patient yelled \"no\". Patient's vitals stable. Bed locked on lowest positing with bed alarm on. Call bell left within reach. 1744: When changing patient, noticed patient has reddened sacrum. Applied foam dressing, removed diaper. See wound care doc flow sheet.  PRN dose of meclizine administered per patient's request.

## 2017-09-16 NOTE — PROGRESS NOTES
Problem: Falls - Risk of  Goal: *Absence of Falls  Document Sarita Fall Risk and appropriate interventions in the flowsheet.    Outcome: Progressing Towards Goal  Fall Risk Interventions:  Mobility Interventions: Bed/chair exit alarm, Communicate number of staff needed for ambulation/transfer, OT consult for ADLs, PT Consult for mobility concerns, Patient to call before getting OOB, PT Consult for assist device competence, Strengthening exercises (ROM-active/passive), Utilize walker, cane, or other assitive device     Mentation Interventions: Adequate sleep, hydration, pain control, Bed/chair exit alarm, Door open when patient unattended, Evaluate medications/consider consulting pharmacy, Gait belt with transfers/ambulation, Increase mobility, More frequent rounding, Reorient patient, Room close to nurse's station, Toileting rounds, Update white board     Medication Interventions: Bed/chair exit alarm, Evaluate medications/consider consulting pharmacy, Patient to call before getting OOB, Teach patient to arise slowly     Elimination Interventions: Bed/chair exit alarm, Call light in reach, Patient to call for help with toileting needs, Toileting schedule/hourly rounds

## 2017-09-16 NOTE — PROGRESS NOTES
7517 Assumed care of patient from [facility]. Patient calm, confused, eyes open, no signs of distress, bed alarm activated, call bell within reach, will continue to monitor. 7288 Received a call from CT department patient not cooperating on the CT table, had a episode of vomiting. Sending patient back to the unit. Patient not able to complete CT head ordered yesterday related agitation and acute AMS. [address] [facility] [address] Savannah Hoang on unit, updated on patient's current status. Patient now experiencing vomiting along with her poor appetite and no bowel movement in the last 3-4 days. Dr Savannah Hoang also made aware patient can not complete CT of head for acute AMS related to her inability to remain still on CT table and new onset of vomiting. KUB ordered today. 1037 Verbal order from Dr Savannah Hoang for Zofran 4 mg iv push prn  for complaints of nausea and vomiting. 1051 Zofran 4 mg po prn given for nausea. Will continue to monitor. [phone] Patient resting with eyes closed, no signs of distress, bed alarm activate, will endorse to oncoming nurse to continue to monitor. KUB results are available, no further episodes of vomiting.

## 2017-09-16 NOTE — PROGRESS NOTES
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       therapy 9/2017        Anxiety (9/11/2017)        Heart failure ([facility] (9/11/2017)         UTI   Visual halluncinatioan 2nd to drugs possibly Prednisone vs Levo   Hallucination      Plan:   Restart levo at lower dose  Once eating her K should get better        CC: CHF  Interval History: Has hallucination  Breathing better      Subjective:   PT does not have any somatic complaints              Review of Systems  Negative for Edema, Chest pain, Tremors, Nausea, vomiting  Has difficult swallowing              Review of Systems  Negative for Edema, SOB, Chest pain, Tremors, Nausea, vomiting        Blood pressure (!) 148/105, pulse (!) 112, temperature 97.3 °F (36.3 °C), resp. rate 18, height 5' (1.524 m), weight 86.3 kg (190 lb 4.1 oz), SpO2 95 %.       awake and alert   NAD        Intake/Output Summary (Last 24 hours) at 09/16/17 1704  Last data filed at 09/16/17 1022   Gross per 24 hour   Intake              410 ml   Output                0 ml   Net              410 ml      Recent Labs      09/15/17   1534   WBC  23.1*     Lab Results   Component Value Date/Time    Sodium 142 09/16/2017 02:40 AM    Potassium 3.4 09/16/2017 02:40 AM    Chloride 97 09/16/2017 02:40 AM    CO2 33 09/16/2017 02:40 AM    Anion gap 12 09/16/2017 02:40 AM    Glucose 98 09/16/2017 02:40 AM    BUN 71 09/16/2017 02:40 AM    Creatinine 1.79 09/16/2017 02:40 AM    BUN/Creatinine ratio 40 09/16/2017 02:40 AM    GFR est AA 33 09/16/2017 02:40 AM    GFR est non-AA 27 09/16/2017 02:40 AM    Calcium 11.1 09/16/2017 02:40 AM        Current Facility-Administered Medications   Medication Dose Route Frequency Provider Last Rate Last Dose    ondansetron (ZOFRAN) injection 4 mg  4 mg IntraVENous Q6H PRN Venora Kawasaki, MD   4 mg at 09/16/17 1051    levoFLOXacin (LEVAQUIN) tablet 250 mg  250 mg Oral Q24H Anni Alex DO        QUEtiapine (SEROquel) tablet 25 mg  25 mg Oral QHS Brandon Leung MD        metoprolol (LOPRESSOR) injection 2.5 mg  2.5 mg IntraVENous Q4H Brandon Leung MD   2.5 mg at 09/16/17 1542    insulin glargine (LANTUS) injection 50 Units  50 Units SubCUTAneous DAILY Brandon Leung MD   50 Units at 09/16/17 1025    insulin lispro (HUMALOG) injection 8 Units  8 Units SubCUTAneous Jillian Loyd MD   Stopped at 09/15/17 1630    insulin lispro (HUMALOG) injection   SubCUTAneous AC&HS Brandon Leung MD   4 Units at 09/16/17 1251    furosemide (LASIX) injection 40 mg  40 mg IntraVENous Q12H Anni Alex DO   40 mg at 09/16/17 1025    pyridoxine (vitamin B6) (VITAMIN B-6) tablet 25 mg  25 mg Oral DAILY Anni Alex DO   25 mg at 09/14/17 5369    albuterol-ipratropium (DUO-NEB) 2.5 MG-0.5 MG/3 ML  3 mL Nebulization Q4H PRN Brandon Leung MD        mirtazapine (REMERON SOL-TAB) disintegrating tablet 30 mg  30 mg Oral QHS Lizbet Frankel MD   30 mg at 09/14/17 2228    acetaminophen (TYLENOL) tablet 650 mg  650 mg Oral Q4H PRN Lizbet Frankel MD   650 mg at 09/12/17 0634    amLODIPine (NORVASC) tablet 10 mg  10 mg Oral DAILY Lizbet Frankel MD   10 mg at 09/14/17 3485    aspirin delayed-release tablet 81 mg  81 mg Oral DAILY Lizbet Frankel MD   81 mg at 09/14/17 0112    calcium carbonate (TUMS) chewable tablet 400 mg [elemental]  400 mg Oral TID Lizbet Frankel MD   Stopped at 09/16/17 0900    cholecalciferol (VITAMIN D3) tablet 1,000 Units  1,000 Units Oral DAILY Lizbet Frankel MD   1,000 Units at 09/14/17 1169    cloNIDine HCl (CATAPRES) tablet 0.2 mg  0.2 mg Oral BID Lizbet Frankel MD   Stopped at 09/16/17 0900    clopidogrel (PLAVIX) tablet 75 mg  75 mg Oral DAILY Amador Segura MD   75 mg at 09/14/17 9626    clotrimazole-betamethasone (LOTRISONE) 1-0.05 % cream   Topical BID Amador Segura MD   Stopped at 09/16/17 0900    diphenhydrAMINE (BENADRYL) capsule 25 mg  25 mg Oral Q6H PRN Amador Segura MD        isosorbide mononitrate ER (IMDUR) tablet 30 mg  30 mg Oral BID Amador Segura MD   Stopped at 09/14/17 2100    levothyroxine (SYNTHROID) tablet 150 mcg  150 mcg Oral ACB Amador Segura MD   150 mcg at 09/14/17 0655    magnesium oxide (MAG-OX) tablet 400 mg  400 mg Oral DAILY Amador Segura MD   400 mg at 09/14/17 1039    meclizine (ANTIVERT) tablet 12.5 mg  12.5 mg Oral Q6H PRN Amador Segura MD   12.5 mg at 09/13/17 1720    nitroglycerin (NITROSTAT) tablet 0.4 mg  0.4 mg SubLINGual PRN Amador Segura MD        pantoprazole (PROTONIX) tablet 40 mg  40 mg Oral DAILY Amador Segura MD   40 mg at 09/14/17 2520    pitavastatin calcium (LIVALO) tablet 1 mg  1 mg Oral DAILY Amador Segura MD   1 mg at 09/13/17 0900    polyethylene glycol (MIRALAX) packet 17 g  17 g Oral PRN Amador Segura MD        promethazine (PHENERGAN) 6.25 mg/5 mL syrup 6.25 mg  6.25 mg Oral QID PRN Amador Segura MD        traMADol Trina Bigger) tablet 50 mg  50 mg Oral Q6H PRN Amador Segura MD        naloxone [facility]) injection 0.4 mg  0.4 mg IntraVENous PRN Amador Segura MD        heparin (porcine) injection 5,000 Units  5,000 Units SubCUTAneous Q8H Amador Segura MD   5,000 Units at 09/16/17 1024    glucose chewable tablet 16 g  4 Tab Oral PRN Amador Segura MD        glucagon (GLUCAGEN) injection 1 mg  1 mg IntraMUSCular PRN Amador Segura MD        dextrose (D50W) injection syrg 12.5-25 g  25-50 mL IntraVENous PRN Amador Segura MD        morphine injection 1 mg  1 mg IntraVENous Q4H PRN Chong Taylor Whitney Gonzales MD   1 mg at 09/13/17 2153    melatonin tablet 5 mg  5 mg Oral QHS Edwin Michel MD   5 mg at 09/14/17 2228    chlorothiazide (DIURIL) 500 mg in 0.9% sodium chloride 50 mL IVPB  500 mg IntraVENous DAILY Anni Alex DO   500 mg at 09/16/17 1022       )Xr Chest Sngl V    Result Date: 9/4/2017  EXAM: AP radiograph of the chest INDICATION: Shortness of breath COMPARISON: March 10, 2017, July 16, 2016  FINDINGS: Normal heart size and mediastinal contour. No consolidation, pleural effusion, or pneumothorax. No acute osseous abnormalities. _  IMPRESSION: No acute pulmonary findings     Xr Knee Lt Min 4 V    Result Date: 8/1/2017  Indication: Fall, pain. Comments: AP, lateral, and bilateral oblique views of the left knee are submitted for evaluation. There is no evidence of fracture or dislocation. No joint effusion is identified. There is small superior and inferior patella enthesophytes. Minimal articular surface irregularity of the patella. Atherosclerotic calcifications noted. The soft tissue structures are unremarkable. Osseous mineralization is normal.     IMPRESSION: No evidence for acute fracture or dislocation. Mild degenerative changes and atherosclerosis. Xr Swallow Func Video    Result Date: 9/13/2017  Modified Barium swallow History: Dysphasia with cough, feeding difficulties. Technique: The patient orally ingested various barium materials under the direction of a speech pathologist with direct fluoroscopic observation. Total fluoroscopy time: 36 seconds; fluoroscopy dose: 4.37mGy reference in air kerma Findings: The patient ingested thin, pudding, and a barium coated pill consistencies. Laryngeal aspiration occurred with thin liquids administered by straw. The aspiration resulted in a cough reflex. Penetration to the vocal cords was noted with thin liquids administered by both cup and straw. Premature spillage was noted with any consistency.    Oropharyngeal phase was unremarkable. IMPRESSION: 1. Aspiration with cough reflex related to thin liquids administered by straw. Laryngeal penetration to the level of the vocal cords noted within the contents of the cervical and strut. Please refer to the separate report and recommendations from the speech pathologist.    Ct Head Wo Cont    Result Date: 8/1/2017  CT scan of the head without IV contrast Images: 154  HISTORY: Status post fall. TECHNIQUE: Serial axial images were obtained from the foramen magnum to the skull vertex without IV contrast. One or more dose reduction techniques were used on this CT: automated exposure control, adjustment of the mAs and/or kVp according to patient's size, and iterative reconstruction techniques. The specific techniques utilized on this CT exam have been documented in the patient's electronic medical record. COMPARISON:  None. FINDINGS: The sulci, ventricles, and basal cisterns are within normal limits for age with age concordant atrophy. There is no intracranial hemorrhage, mass-effect, or midline shift. No extra-axial fluid collections are identified. Periventricular white matter hypodensities are noted. These are nonspecific but often seen with chronic small vessel ischemic change. Otherwise, there are no significant areas of abnormal parenchymal attenuation. The visualized portions of the paranasal sinuses and mastoid air cells show no air-fluid levels. The visualized bones are intact. IMPRESSION: No evidence of acute traumatic injury to the brain or cranium. Atrophy and deep white matter changes are identified. Xr Chest Port    Result Date: 9/11/2017  EXAM: Chest portable INDICATION: Short of breath COMPARISON: Single view chest 9/3/2017  FINDINGS: AP portable chest film was performed.  Suboptimal inspiration similar to previous exam. There are new bilaterally symmetrically increased interstitial markings. No focal infiltrate. No definite effusion. No pneumothorax. Heart is at the upper limits of normal. Mild central vascular congestion. IMPRESSION: 1. New symmetrically increased interstitial infiltrates. Differential includes acute onset interstitial pulmonary edema which is felt to be most likely. Acute interstitial pneumonitis is felt to be less likely. Xr Spine Lumb Trauma 2 V    Result Date: 8/1/2017  AP AND LATERAL LUMBAR SPINE: Indication:  Status post fall. Back pain. AP and cross table lateral views, total 4 films. Five non rib bearing lumbar vertebra. There is no evidence of compression fracture. Minimal anterolisthesis of L4 and L5 likely on degenerative basis. Moderate to marked loss of disc height at L2-3, through L5-S1. Marked to severe facet arthropathy at all levels. Marked aortic atherosclerosis. Bilateral hip arthropathy. IMPRESSION: 1. No compression fracture. 2. Multilevel spondylosis with moderate to marked degenerative disease and facet arthropathy. Nm Lung Perfusion W Vent    Result Date: 9/11/2017  Ventilation-perfusion lung scan History:  Shortness of breath, acute in onset. Comparison:  Portable chest earlier the same day Technique: Ventilation imaging was performed after inhalation of 0.8 millicuries of Tc 79S DTPA with a nebulizer followed by imaging in multiple projections. Perfusion imaging was performed after intravenous injection of 7.2 millicuries of Tc 15Y MAA followed by imaging in multiple projections. Injection site: Right antecubital fossa vein Findings: Ventilation imaging shows no significant ventilation defects. Perfusion imaging shows mild inhomogeneity along the peripheral aspect of the lungs. No focal segmental perfusion defect is seen. No perfusion mismatches are present. Impression: Low probability for pulmonary embolism without definite focal segmental perfusion defect.

## 2017-09-16 NOTE — PROGRESS NOTES
Hospitalist Progress Note    Patient: Melinda Alamo MRN: [id number]  CSN: [id number]    YOB: 1932  Age: 80 y.o. 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 Off prednisone -might cause psychosis from po steroid   Give on dose haldol for calm her. Will avoid ativan   Quetiapine at night   Hold d/c for today     CHF (congestive heart failure) (HCC) Stable. Continue diuretics   Ef wnl, diastolic dysfunction, monitor electrolytes           PAM (acute kidney injury) on ckd4 : Cr stable   Renal on board    Hypothyroidism: On levo.        DM (diabetes mellitus) ([facility] (7/17/2016): Continue  lantus and premeal insulin. 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 will hold radiation per oncology       Dysphagia ; dysohagia diet ,      Monitor labs    Continue inpatient hospital care    Full code    Dispo: SNF       CC:   chf exacerbation, pam on ckd, carcinoma, acute encephalopathy   Stridor, anxiety          Subjective:     Pt was seen and examined with the nurse in the morning round.    More alert today. +dysphagia        Review of systems  General: No fevers or chills. Cardiovascular: No chest pain or pressure. No palpitations. Pulmonary: No cough, SOB  Gastrointestinal: No nausea, vomiting. Objective:      Visit Vitals    BP (!) 148/105    Pulse (!) 112    Temp 97.3 °F (36.3 °C)    Resp 18    Ht 5' (1.524 m)    Wt 86.3 kg (190 lb 4.1 oz)    SpO2 95%    BMI 37.16 kg/m2       Physical Exam:    Gen: NAD, chronic ill appearing. +erythema and swelling to neck  Heent:  MMM, NC, AT. Cor: s1s2 RRR. No MRG. PMI mid 5th intercostal space. Resp:  CTA b/l. No w/r/r. Nml effort and diaphragmatic excursion. Abd:  NT ND.  BS positive. No rebound or guarding. No masses. Ext: No edema or cyanosis. Intake and Output:  Current Shift:  09/16 0701 - 09/16 1900  In: 290 [P.O.:240; I.V.:50]  Out: -   Last three shifts:  09/14 1901 - 09/16 0700  In: 0 [P.O.:540; I.V.:50]  Out: 0     Labs: Results:       Chemistry Recent Labs      09/16/17   0240  09/15/17   0511  09/14/17   0618   GLU  98  135*  208*   NA  142  138  136   K  3.4*  3.0*  3.0*   CL  97*  98*  97*   CO2  33*  34*  34*   BUN  71*  75*  68*   CREA  1.79*  1.99*  1.92*   CA  11.1*  10.5*  10.1   AGAP  12  6  5   BUCR  40*  38*  35*   ALB  4.0  3.6  3.3*      CBC w/Diff Recent Labs      09/15/17   1534   WBC  23.1*   RBC  5.92*   HGB  14.5   HCT  43.6   PLT  608*   GRANS  85*   LYMPH  8*   EOS  0      Cardiac Enzymes No results for input(s): CPK, CKND1, DORITA in the last 72 hours. No lab exists for component: CKRMB, TROIP   Coagulation No results for input(s): PTP, INR, APTT in the last 72 hours.     No lab exists for component: INREXT    Lipid Panel Lab Results   Component Value Date/Time    Cholesterol, total 124 07/17/2016 02:21 AM    HDL Cholesterol 41 07/17/2016 02:21 AM    LDL, calculated 57.6 07/17/2016 02:21 AM    VLDL, calculated 25.4 07/17/2016 02:21 AM    Triglyceride 127 07/17/2016 02:21 AM    CHOL/HDL Ratio 3.0 07/17/2016 02:21 AM      BNP No results for input(s): BNPP in the last 72 hours.    Liver Enzymes Recent Labs      09/16/17   0240   ALB  4.0      Thyroid Studies Lab Results   Component Value Date/Time    TSH 1.92 03/11/2017 04:41 AM        Procedures/imaging: see electronic medical records for all procedures/Xrays and details which were not copied into this note but were reviewed prior to creation of Plan      Medications Reviewed  Andrea Dumont MD

## 2017-09-16 NOTE — PROGRESS NOTES
1925: Assumed patient care, She was alert and oriented to person, but disoriented to place, time and situation. Respiratory status was stable on 2L via nasal cannula. Vital signs were stable. MEWS score was a one. Patient denied any nausea vomiting dizziness or anxiety. White board was updated and explained. Bed was locked and in lowest position. Call bell, water and personal belongings were within reach. Patient had no questions, comments or concerns after bedside shift report. 0700: Patient refused all medications and treatments this shift. Respiratory status, vital signs and MEWS score remained stable. Patient was resting quietly with no signs of distress noted. Bed locked and in lowest position. Call bell water and personal belongings were within reach. Patient had no questions, comments or concerns after bedside shift report.  Bedside report given to [facility] CLAY

## 2017-09-16 NOTE — PROGRESS NOTES
Pharmacist Renal Dosing Progress Note for Levofloxacin  Physician Dr. Kate Geller    The following medication: Levofloxacin was automatically dose-adjusted per THE [facility] P&T Committee Protocol, with respect to renal function. Consult provided for this   80 y.o. , female , for the indication of UTI. Scr = 1.79  CrCl = 22.4 ml/min    Dose adjusted to:  Levofloxacin 250 mg PO q24h    Pt Weight:   Wt Readings from Last 1 Encounters:   09/16/17 86.3 kg (190 lb 4.1 oz)     Previous Regimen   Levofloxacin 250 mg PO q48h     Serum Creatinine Lab Results   Component Value Date/Time    Creatinine 1.79 09/16/2017 02:40 AM       Creatinine Clearance Estimated Creatinine Clearance: 22.4 mL/min (based on Cr of 1.79). BUN Lab Results   Component Value Date/Time    BUN 71 09/16/2017 02:40 AM         Pharmacy to continue to monitor patient daily. Will make dosage adjustments based upon changing renal function.   Signed Ari Leon information:  [phone]

## 2017-09-16 NOTE — ROUTINE PROCESS
Bedside and Verbal shift change report given to Steve Mandujano RN   (oncoming nurse) by Flex Aleman RN   (offgoing nurse). Report included the following information SBAR, Kardex, Intake/Output, MAR, Accordion, Recent Results, Med Rec Status and Alarm Parameters .

## 2017-09-16 NOTE — ROUTINE PROCESS
Bedside and Verbal shift change report given to LANA Dunn (oncoming nurse) by Celeste (offgoing nurse). Report included the following information SBAR, Kardex, Intake/Output, MAR and Recent Results.

## 2017-09-17 ENCOUNTER — APPOINTMENT (OUTPATIENT)
Dept: CT IMAGING | Age: 82
DRG: 291 | End: 2017-09-17
Attending: HOSPITALIST
Payer: MEDICARE

## 2017-09-17 LAB
ALBUMIN SERPL-MCNC: 3.6 G/DL (ref 3.4–5)
ANION GAP SERPL CALC-SCNC: 6 MMOL/L (ref 3–18)
ANION GAP SERPL CALC-SCNC: 9 MMOL/L (ref 3–18)
BUN SERPL-MCNC: 105 MG/DL (ref 7–18)
BUN SERPL-MCNC: 95 MG/DL (ref 7–18)
BUN/CREAT SERPL: 40 (ref 12–20)
BUN/CREAT SERPL: 41 (ref 12–20)
CALCIUM SERPL-MCNC: 10.3 MG/DL (ref 8.5–10.1)
CALCIUM SERPL-MCNC: 9.8 MG/DL (ref 8.5–10.1)
CHLORIDE SERPL-SCNC: 97 MMOL/L (ref 100–108)
CHLORIDE SERPL-SCNC: 98 MMOL/L (ref 100–108)
CO2 SERPL-SCNC: 36 MMOL/L (ref 21–32)
CO2 SERPL-SCNC: 37 MMOL/L (ref 21–32)
CREAT SERPL-MCNC: 2.36 MG/DL (ref 0.6–1.3)
CREAT SERPL-MCNC: 2.58 MG/DL (ref 0.6–1.3)
GLUCOSE BLD STRIP.AUTO-MCNC: 126 MG/DL (ref 70–110)
GLUCOSE BLD STRIP.AUTO-MCNC: 189 MG/DL (ref 70–110)
GLUCOSE BLD STRIP.AUTO-MCNC: 194 MG/DL (ref 70–110)
GLUCOSE BLD STRIP.AUTO-MCNC: 197 MG/DL (ref 70–110)
GLUCOSE SERPL-MCNC: 187 MG/DL (ref 74–99)
GLUCOSE SERPL-MCNC: 198 MG/DL (ref 74–99)
MAGNESIUM SERPL-MCNC: 2.7 MG/DL (ref 1.6–2.6)
PHOSPHATE SERPL-MCNC: 4.1 MG/DL (ref 2.5–4.9)
POTASSIUM SERPL-SCNC: 2.8 MMOL/L (ref 3.5–5.5)
POTASSIUM SERPL-SCNC: 3.3 MMOL/L (ref 3.5–5.5)
SODIUM SERPL-SCNC: 140 MMOL/L (ref 136–145)
SODIUM SERPL-SCNC: 143 MMOL/L (ref 136–145)

## 2017-09-17 PROCEDURE — 65660000000 HC RM CCU STEPDOWN

## 2017-09-17 PROCEDURE — 70450 CT HEAD/BRAIN W/O DYE: CPT

## 2017-09-17 PROCEDURE — 74011000258 HC RX REV CODE- 258: Performed by: INTERNAL MEDICINE

## 2017-09-17 PROCEDURE — 74011250636 HC RX REV CODE- 250/636: Performed by: INTERNAL MEDICINE

## 2017-09-17 PROCEDURE — 74011000250 HC RX REV CODE- 250: Performed by: INTERNAL MEDICINE

## 2017-09-17 PROCEDURE — 36415 COLL VENOUS BLD VENIPUNCTURE: CPT | Performed by: INTERNAL MEDICINE

## 2017-09-17 PROCEDURE — 82962 GLUCOSE BLOOD TEST: CPT

## 2017-09-17 PROCEDURE — 80048 BASIC METABOLIC PNL TOTAL CA: CPT

## 2017-09-17 PROCEDURE — 74011250637 HC RX REV CODE- 250/637: Performed by: INTERNAL MEDICINE

## 2017-09-17 PROCEDURE — 74011000250 HC RX REV CODE- 250: Performed by: HOSPITALIST

## 2017-09-17 PROCEDURE — 80069 RENAL FUNCTION PANEL: CPT | Performed by: INTERNAL MEDICINE

## 2017-09-17 PROCEDURE — 74011250637 HC RX REV CODE- 250/637: Performed by: HOSPITALIST

## 2017-09-17 PROCEDURE — 83735 ASSAY OF MAGNESIUM: CPT | Performed by: INTERNAL MEDICINE

## 2017-09-17 PROCEDURE — 74011636637 HC RX REV CODE- 636/637: Performed by: HOSPITALIST

## 2017-09-17 PROCEDURE — 74011250636 HC RX REV CODE- 250/636: Performed by: FAMILY MEDICINE

## 2017-09-17 RX ORDER — METOPROLOL TARTRATE 5 MG/5ML
5 INJECTION INTRAVENOUS EVERY 4 HOURS
Status: DISCONTINUED | OUTPATIENT
Start: 2017-09-17 | End: 2017-09-18

## 2017-09-17 RX ORDER — POTASSIUM CHLORIDE 7.45 MG/ML
10 INJECTION INTRAVENOUS
Status: COMPLETED | OUTPATIENT
Start: 2017-09-17 | End: 2017-09-17

## 2017-09-17 RX ORDER — HALOPERIDOL 5 MG/ML
2 INJECTION INTRAMUSCULAR
Status: DISCONTINUED | OUTPATIENT
Start: 2017-09-17 | End: 2017-09-19

## 2017-09-17 RX ADMIN — POTASSIUM CHLORIDE 10 MEQ: 10 INJECTION, SOLUTION INTRAVENOUS at 10:34

## 2017-09-17 RX ADMIN — METOROPROLOL TARTRATE 5 MG: 5 INJECTION, SOLUTION INTRAVENOUS at 16:48

## 2017-09-17 RX ADMIN — CHLOROTHIAZIDE SODIUM 500 MG: 500 INJECTION, POWDER, LYOPHILIZED, FOR SOLUTION INTRAVENOUS at 15:12

## 2017-09-17 RX ADMIN — QUETIAPINE FUMARATE 25 MG: 25 TABLET, FILM COATED ORAL at 22:00

## 2017-09-17 RX ADMIN — HEPARIN SODIUM 5000 UNITS: 5000 INJECTION, SOLUTION INTRAVENOUS; SUBCUTANEOUS at 19:20

## 2017-09-17 RX ADMIN — AMLODIPINE BESYLATE 10 MG: 5 TABLET ORAL at 08:45

## 2017-09-17 RX ADMIN — FUROSEMIDE 40 MG: 10 INJECTION, SOLUTION INTRAMUSCULAR; INTRAVENOUS at 08:31

## 2017-09-17 RX ADMIN — METOROPROLOL TARTRATE 5 MG: 5 INJECTION, SOLUTION INTRAVENOUS at 22:16

## 2017-09-17 RX ADMIN — INSULIN GLARGINE 50 UNITS: 100 INJECTION, SOLUTION SUBCUTANEOUS at 08:31

## 2017-09-17 RX ADMIN — HALOPERIDOL LACTATE 2 MG: 5 INJECTION, SOLUTION INTRAMUSCULAR at 08:30

## 2017-09-17 RX ADMIN — HEPARIN SODIUM 5000 UNITS: 5000 INJECTION, SOLUTION INTRAVENOUS; SUBCUTANEOUS at 12:02

## 2017-09-17 RX ADMIN — HEPARIN SODIUM 5000 UNITS: 5000 INJECTION, SOLUTION INTRAVENOUS; SUBCUTANEOUS at 03:02

## 2017-09-17 RX ADMIN — METOPROLOL TARTRATE 2.5 MG: 5 INJECTION INTRAVENOUS at 03:02

## 2017-09-17 RX ADMIN — POTASSIUM CHLORIDE 10 MEQ: 10 INJECTION, SOLUTION INTRAVENOUS at 09:00

## 2017-09-17 RX ADMIN — INSULIN LISPRO 2 UNITS: 100 INJECTION, SOLUTION INTRAVENOUS; SUBCUTANEOUS at 12:02

## 2017-09-17 RX ADMIN — LEVOTHYROXINE SODIUM 150 MCG: 150 TABLET ORAL at 06:16

## 2017-09-17 RX ADMIN — INSULIN LISPRO 2 UNITS: 100 INJECTION, SOLUTION INTRAVENOUS; SUBCUTANEOUS at 16:49

## 2017-09-17 RX ADMIN — POTASSIUM CHLORIDE 10 MEQ: 10 INJECTION, SOLUTION INTRAVENOUS at 05:39

## 2017-09-17 RX ADMIN — POTASSIUM CHLORIDE 10 MEQ: 10 INJECTION, SOLUTION INTRAVENOUS at 08:31

## 2017-09-17 RX ADMIN — ONDANSETRON 4 MG: 2 INJECTION INTRAMUSCULAR; INTRAVENOUS at 23:38

## 2017-09-17 RX ADMIN — METOROPROLOL TARTRATE 5 MG: 5 INJECTION, SOLUTION INTRAVENOUS at 12:02

## 2017-09-17 RX ADMIN — METOROPROLOL TARTRATE 5 MG: 5 INJECTION, SOLUTION INTRAVENOUS at 05:39

## 2017-09-17 RX ADMIN — HALOPERIDOL LACTATE 2 MG: 5 INJECTION, SOLUTION INTRAMUSCULAR at 17:13

## 2017-09-17 RX ADMIN — ISOSORBIDE MONONITRATE 30 MG: 30 TABLET, EXTENDED RELEASE ORAL at 21:32

## 2017-09-17 RX ADMIN — CLONIDINE HYDROCHLORIDE 0.2 MG: 0.1 TABLET ORAL at 21:32

## 2017-09-17 RX ADMIN — INSULIN LISPRO 2 UNITS: 100 INJECTION, SOLUTION INTRAVENOUS; SUBCUTANEOUS at 06:16

## 2017-09-17 RX ADMIN — POLYETHYLENE GLYCOL 3350 17 G: 17 POWDER, FOR SOLUTION ORAL at 23:38

## 2017-09-17 RX ADMIN — FUROSEMIDE 40 MG: 10 INJECTION, SOLUTION INTRAMUSCULAR; INTRAVENOUS at 21:31

## 2017-09-17 NOTE — ROUTINE PROCESS
Bedside and Verbal shift change report given to Karen Hernandez RN (oncoming nurse) by Aleida Jim RN   (offgoing nurse). Report included the following information SBAR, Kardex, Procedure Summary, Intake/Output, MAR, Accordion, Recent Results, Med Rec Status and Alarm Parameters .

## 2017-09-17 NOTE — PROGRESS NOTES
Hospitalist Progress Note    Patient: Wilian Ayala MRN: [id number]  CSN: [id number]    YOB: 1932  Age: 80 y.o. 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 Will avoid ativan   Quetiapine at night   Hold d/c for today     Hypokalemia: K+ repletion. Monitor closely.      CHF (congestive heart failure) (HCC) Stable. Continue diuretics   Ef wnl, diastolic dysfunction, monitor electrolytes           PAM (acute kidney injury) on ckd4 : Cr stable   Renal on board     Hypothyroidism: On levo.        DM (diabetes mellitus) ([address] (7/17/2016): Continue  lantus and premeal insulin. 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  will hold radiation per oncology       Dysphagia ; dysohagia diet ,       Monitor labs     Continue inpatient hospital care     Full code     Dispo: SNF        CC: agitation,  chf exacerbation, pam on ckd, carcinoma, acute encephalopathy   Stridor, anxiety            Subjective:     Pt was seen and examined with the nurse in the morning round. Pt was agitated and confused this am.     Review of systems  Unobtainable     Objective:      Visit Vitals    /60 (BP 1 Location: Left arm, BP Patient Position: At rest)    Pulse 95    Temp 99.4 °F (37.4 °C)    Resp 20    Ht 5' (1.524 m)    Wt 76.2 kg (167 lb 14.4 oz)    SpO2 95%    BMI 32.79 kg/m2       Physical Exam:    Gen: NAD, chronic ill appearing. +erythema and swelling to neck  Heent:  MMM, NC, AT. Cor: s1s2 RRR. No MRG. PMI mid 5th intercostal space. Resp:  CTA b/l. No w/r/r. Nml effort and diaphragmatic excursion. Abd:  NT ND.  BS positive. No rebound or guarding. No masses. Ext: 1+ edema. Intake and Output:  Current Shift:     Last three shifts:  09/15 1901 - 09/17 0700  In: 910 [P.O.:860; I.V.:50]  Out: 0     Labs: Results:       Chemistry Recent Labs      09/17/17   0303  09/16/17   0240  09/15/17   0511   GLU  187*  98  135*   NA  143  142  138   K  2.8*  3.4*  3.0*   CL  97*  97*  98*   CO2  37*  33*  34*   BUN  95*  71*  75*   CREA  2.36*  1.79*  1.99*   CA  10.3*  11.1*  10.5*   AGAP  9  12  6   BUCR  40*  40*  38*   ALB  3.6  4.0  3.6      CBC w/Diff Recent Labs      09/15/17   1534   WBC  23.1*   RBC  5.92*   HGB  14.5   HCT  43.6   PLT  608*   GRANS  85*   LYMPH  8*   EOS  0      Cardiac Enzymes No results for input(s): CPK, CKND1, DORITA in the last 72 hours. No lab exists for component: CKRMB, TROIP   Coagulation No results for input(s): PTP, INR, APTT in the last 72 hours. No lab exists for component: INREXT    Lipid Panel Lab Results   Component Value Date/Time    Cholesterol, total 124 07/17/2016 02:21 AM    HDL Cholesterol 41 07/17/2016 02:21 AM    LDL, calculated 57.6 07/17/2016 02:21 AM    VLDL, calculated 25.4 07/17/2016 02:21 AM    Triglyceride 127 07/17/2016 02:21 AM    CHOL/HDL Ratio 3.0 07/17/2016 02:21 AM      BNP No results for input(s): BNPP in the last 72 hours.    Liver Enzymes Recent Labs      09/17/17   0303   ALB  3.6      Thyroid Studies Lab Results   Component Value Date/Time    TSH 1.92 03/11/2017 04:41 AM        Procedures/imaging: see electronic medical records for all procedures/Xrays and details which were not copied into this note but were reviewed prior to creation of Plan      Medications Reviewed  Morteza Quevedo MD

## 2017-09-17 NOTE — PROGRESS NOTES
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       therapy 9/2017        Anxiety (9/11/2017)        Heart failure ([facility] (9/11/2017)         UTI   Visual halluncinatioan 2nd to drugs possibly Prednisone vs Levo   Hallucination      Plan:   Restart levo at lower dose  Once eating her K should get better        CC: CHF  Interval History: Has hallucination  Breathing better      Subjective:   PT does not have any somatic complaints              Review of Systems  Negative for Edema, Chest pain, Tremors, Nausea, vomiting  Has difficult swallowing              Review of Systems  Negative for Edema, SOB, Chest pain, Tremors, Nausea, vomiting        Blood pressure 134/59, pulse 71, temperature 97.4 °F (36.3 °C), resp. rate 20, height 5' (1.524 m), weight 76.2 kg (167 lb 14.4 oz), SpO2 98 %.       awake and alert   NAD        Intake/Output Summary (Last 24 hours) at 09/17/17 1940  Last data filed at 09/17/17 1920   Gross per 24 hour   Intake              300 ml   Output                0 ml   Net              300 ml      Recent Labs      09/15/17   1534   WBC  23.1*     Lab Results   Component Value Date/Time    Sodium 140 09/17/2017 03:05 PM    Potassium 3.3 09/17/2017 03:05 PM    Chloride 98 09/17/2017 03:05 PM    CO2 36 09/17/2017 03:05 PM    Anion gap 6 09/17/2017 03:05 PM    Glucose 198 09/17/2017 03:05 PM     09/17/2017 03:05 PM    Creatinine 2.58 09/17/2017 03:05 PM    BUN/Creatinine ratio 41 09/17/2017 03:05 PM    GFR est AA 21 09/17/2017 03:05 PM    GFR est non-AA 18 09/17/2017 03:05 PM    Calcium 9.8 09/17/2017 03:05 PM Current Facility-Administered Medications   Medication Dose Route Frequency Provider Last Rate Last Dose    metoprolol (LOPRESSOR) injection 5 mg  5 mg IntraVENous Q4H Ofilia Back, DO   5 mg at 09/17/17 1648    haloperidol lactate (HALDOL) injection 2 mg  2 mg IntraMUSCular Q6H PRN Tenisha Waters MD   2 mg at 09/17/17 1713    ondansetron (ZOFRAN) injection 4 mg  4 mg IntraVENous Q6H PRN Tenisha Waters MD   4 mg at 09/16/17 1944    levoFLOXacin (LEVAQUIN) tablet 250 mg  250 mg Oral Q24H Anni Alex DO        QUEtiapine (SEROquel) tablet 25 mg  25 mg Oral QHS Andrea Delatorre MD   25 mg at 09/16/17 2145    insulin glargine (LANTUS) injection 50 Units  50 Units SubCUTAneous DAILY Andrea Delatorre MD   50 Units at 09/17/17 0831    insulin lispro (HUMALOG) injection 8 Units  8 Units SubCUTAneous Winston Knowles MD   Stopped at 09/15/17 1630    insulin lispro (HUMALOG) injection   SubCUTAneous AC&HS Andrea Delatorre MD   2 Units at 09/17/17 1649    furosemide (LASIX) injection 40 mg  40 mg IntraVENous Q12H Anni Alex DO   40 mg at 09/17/17 0831    pyridoxine (vitamin B6) (VITAMIN B-6) tablet 25 mg  25 mg Oral DAILY Anni Alex DO   25 mg at 09/14/17 2288    albuterol-ipratropium (DUO-NEB) 2.5 MG-0.5 MG/3 ML  3 mL Nebulization Q4H PRN Andrea Delatorre MD        mirtazapine (REMERON SOL-TAB) disintegrating tablet 30 mg  30 mg Oral QHS Olive Garza MD   30 mg at 09/14/17 2228    acetaminophen (TYLENOL) tablet 650 mg  650 mg Oral Q4H PRN Olive Garza MD   650 mg at 09/12/17 0634    amLODIPine (NORVASC) tablet 10 mg  10 mg Oral DAILY Olive Garza MD   10 mg at 09/17/17 0845    aspirin delayed-release tablet 81 mg  81 mg Oral DAILY Olive Garza MD   81 mg at 09/14/17 6847    calcium carbonate (TUMS) chewable tablet 400 mg [elemental]  400 mg Oral TID Olive Garza MD   Stopped at 09/16/17 0900    cholecalciferol (VITAMIN D3) tablet 1,000 Units  1,000 Units Oral DAILY Olive Garza MD 1,000 Units at 09/14/17 6554    cloNIDine HCl (CATAPRES) tablet 0.2 mg  0.2 mg Oral BID Jazmine Martin MD   0.2 mg at 09/16/17 2145    clopidogrel (PLAVIX) tablet 75 mg  75 mg Oral DAILY Jazmine Martin MD   75 mg at 09/14/17 8944    clotrimazole-betamethasone (LOTRISONE) 1-0.05 % cream   Topical BID Jazmine Martin MD   Stopped at 09/16/17 0900    diphenhydrAMINE (BENADRYL) capsule 25 mg  25 mg Oral Q6H PRN Jazmine Martin MD        isosorbide mononitrate ER (IMDUR) tablet 30 mg  30 mg Oral BID Jazmine Martin MD   Stopped at 09/14/17 2100    levothyroxine (SYNTHROID) tablet 150 mcg  150 mcg Oral ACB Jazmine Martin MD   150 mcg at 09/17/17 0616    magnesium oxide (MAG-OX) tablet 400 mg  400 mg Oral DAILY Jazmine Martin MD   400 mg at 09/14/17 5116    meclizine (ANTIVERT) tablet 12.5 mg  12.5 mg Oral Q6H PRN Jazmine Martin MD   12.5 mg at 09/16/17 1751    nitroglycerin (NITROSTAT) tablet 0.4 mg  0.4 mg SubLINGual PRN Jazmine Martin MD        pantoprazole (PROTONIX) tablet 40 mg  40 mg Oral DAILY Jazmine Martin MD   40 mg at 09/14/17 5850    pitavastatin calcium (LIVALO) tablet 1 mg  1 mg Oral DAILY Jazmine Martin MD   1 mg at 09/13/17 0900    polyethylene glycol (MIRALAX) packet 17 g  17 g Oral PRN Jazmine Martin MD        promethazine (PHENERGAN) 6.25 mg/5 mL syrup 6.25 mg  6.25 mg Oral QID PRN Jazmine Martin MD        traMADol Stewa Jaswinder) tablet 50 mg  50 mg Oral Q6H PRN Jazmine Martin MD        naloxone [facility]) injection 0.4 mg  0.4 mg IntraVENous PRN Jazmine Martin MD        heparin (porcine) injection 5,000 Units  5,000 Units SubCUTAneous Q8H Jazmine Martin MD   5,000 Units at 09/17/17 1920    glucose chewable tablet 16 g  4 Tab Oral PRN Jazmine Martin MD        glucagon (GLUCAGEN) injection 1 mg  1 mg IntraMUSCular PRN Jazmine Martin MD        dextrose (D50W) injection syrg 12.5-25 g  25-50 mL IntraVENous PRN Verena Roa MD        morphine injection 1 mg  1 mg IntraVENous Q4H PRN Verena Roa MD   1 mg at 09/13/17 2153    melatonin tablet 5 mg  5 mg Oral QHS Verena Roa MD   5 mg at 09/14/17 2228    chlorothiazide (DIURIL) 500 mg in 0.9% sodium chloride 50 mL IVPB  500 mg IntraVENous DAILY Cristinekaiden Alex, DO   500 mg at 09/17/17 1512       )Xr Chest Sngl V    Result Date: 9/4/2017  EXAM: AP radiograph of the chest INDICATION: Shortness of breath COMPARISON: March 10, 2017, July 16, 2016  FINDINGS: Normal heart size and mediastinal contour. No consolidation, pleural effusion, or pneumothorax. No acute osseous abnormalities. _  IMPRESSION: No acute pulmonary findings     Xr Knee Lt Min 4 V    Result Date: 8/1/2017  Indication: Fall, pain. Comments: AP, lateral, and bilateral oblique views of the left knee are submitted for evaluation. There is no evidence of fracture or dislocation. No joint effusion is identified. There is small superior and inferior patella enthesophytes. Minimal articular surface irregularity of the patella. Atherosclerotic calcifications noted. The soft tissue structures are unremarkable. Osseous mineralization is normal.     IMPRESSION: No evidence for acute fracture or dislocation. Mild degenerative changes and atherosclerosis. Xr Swallow Func Video    Result Date: 9/13/2017  Modified Barium swallow History: Dysphasia with cough, feeding difficulties. Technique: The patient orally ingested various barium materials under the direction of a speech pathologist with direct fluoroscopic observation. Total fluoroscopy time: 36 seconds; fluoroscopy dose: 4.37mGy reference in air kerma Findings: The patient ingested thin, pudding, and a barium coated pill consistencies. Laryngeal aspiration occurred with thin liquids administered by straw. The aspiration resulted in a cough reflex.  Penetration to the vocal cords was noted with thin liquids administered by both cup and straw. Premature spillage was noted with any consistency. Oropharyngeal phase was unremarkable. IMPRESSION: 1. Aspiration with cough reflex related to thin liquids administered by straw. Laryngeal penetration to the level of the vocal cords noted within the contents of the cervical and strut. Please refer to the separate report and recommendations from the speech pathologist.    Ct Head Wo Cont    Result Date: 8/1/2017  CT scan of the head without IV contrast Images: 154  HISTORY: Status post fall. TECHNIQUE: Serial axial images were obtained from the foramen magnum to the skull vertex without IV contrast. One or more dose reduction techniques were used on this CT: automated exposure control, adjustment of the mAs and/or kVp according to patient's size, and iterative reconstruction techniques. The specific techniques utilized on this CT exam have been documented in the patient's electronic medical record. COMPARISON:  None. FINDINGS: The sulci, ventricles, and basal cisterns are within normal limits for age with age concordant atrophy. There is no intracranial hemorrhage, mass-effect, or midline shift. No extra-axial fluid collections are identified. Periventricular white matter hypodensities are noted. These are nonspecific but often seen with chronic small vessel ischemic change. Otherwise, there are no significant areas of abnormal parenchymal attenuation. The visualized portions of the paranasal sinuses and mastoid air cells show no air-fluid levels. The visualized bones are intact. IMPRESSION: No evidence of acute traumatic injury to the brain or cranium. Atrophy and deep white matter changes are identified.                                                                                                                               Xr Chest Port    Result Date: 9/11/2017  EXAM: Chest portable INDICATION: Short of breath COMPARISON: Single view chest 9/3/2017 FINDINGS: AP portable chest film was performed. Suboptimal inspiration similar to previous exam. There are new bilaterally symmetrically increased interstitial markings. No focal infiltrate. No definite effusion. No pneumothorax. Heart is at the upper limits of normal. Mild central vascular congestion. IMPRESSION: 1. New symmetrically increased interstitial infiltrates. Differential includes acute onset interstitial pulmonary edema which is felt to be most likely. Acute interstitial pneumonitis is felt to be less likely. Xr Spine Lumb Trauma 2 V    Result Date: 8/1/2017  AP AND LATERAL LUMBAR SPINE: Indication:  Status post fall. Back pain. AP and cross table lateral views, total 4 films. Five non rib bearing lumbar vertebra. There is no evidence of compression fracture. Minimal anterolisthesis of L4 and L5 likely on degenerative basis. Moderate to marked loss of disc height at L2-3, through L5-S1. Marked to severe facet arthropathy at all levels. Marked aortic atherosclerosis. Bilateral hip arthropathy. IMPRESSION: 1. No compression fracture. 2. Multilevel spondylosis with moderate to marked degenerative disease and facet arthropathy. Nm Lung Perfusion W Vent    Result Date: 9/11/2017  Ventilation-perfusion lung scan History:  Shortness of breath, acute in onset. Comparison:  Portable chest earlier the same day Technique: Ventilation imaging was performed after inhalation of 0.8 millicuries of Tc 71K DTPA with a nebulizer followed by imaging in multiple projections. Perfusion imaging was performed after intravenous injection of 7.2 millicuries of Tc 31V MAA followed by imaging in multiple projections. Injection site: Right antecubital fossa vein Findings: Ventilation imaging shows no significant ventilation defects. Perfusion imaging shows mild inhomogeneity along the peripheral aspect of the lungs. No focal segmental perfusion defect is seen. No perfusion mismatches are present.      Impression: Low probability for pulmonary embolism without definite focal segmental perfusion defect.

## 2017-09-17 NOTE — PROGRESS NOTES
0710:  Assumed care of patient. Patient resting in bed, white board updated, call light within reach, personal belongings at bedside, potassium run infusing. Patient denies any pain and has no other needs at this time. 2985:  Paging Dr. Saul Anderson. Patient needs something by IV for anxiety. PCT, Farida Patel stated patient was yelling for help because she was falling, is restless in bed, and doesn't want to be left alone. New order for haldol via Dr. Saul Anderson.     1295:  Patient off floor to CT for head scan. 8445:  Patient returned to floor from CT scan. Potassium run #2 still infusing. 1038:  Patient resting quietly. Visitor at bedside. Potassium run #3 infusing. 1716:  PRN dose of haldol administered. Patient yelling and is restless in bed. Spa team at bedside getting ready to give patient spa bath.       Shift summary:  See above

## 2017-09-17 NOTE — PROGRESS NOTES
Problem: Falls - Risk of  Goal: *Absence of Falls  Document Sarita Fall Risk and appropriate interventions in the flowsheet.    Outcome: Progressing Towards Goal  Fall Risk Interventions:  Mobility Interventions: Bed/chair exit alarm, Communicate number of staff needed for ambulation/transfer, Patient to call before getting OOB, Strengthening exercises (ROM-active/passive)     Mentation Interventions: Adequate sleep, hydration, pain control, Bed/chair exit alarm, Door open when patient unattended, Evaluate medications/consider consulting pharmacy, Increase mobility, More frequent rounding, Reorient patient, Room close to nurse's station, Toileting rounds     Medication Interventions: Bed/chair exit alarm, Evaluate medications/consider consulting pharmacy, Patient to call before getting OOB     Elimination Interventions: Bed/chair exit alarm, Call light in reach, Patient to call for help with toileting needs, Toileting schedule/hourly rounds

## 2017-09-17 NOTE — PROGRESS NOTES
Problem: Mobility Impaired (Adult and Pediatric)  Goal: *Acute Goals and Plan of Care (Insert Text)  Physical Therapy Goals LT/ST  Initiated 9/12/2017 and to be accomplished within 3-5 day(s)  1. Patient will move from supine <> sit with S in prep for out of bed activity and change of position. 2. Patient will perform sit<> stand with S with LRAD in prep for transfers/ambulation. 3. Patient will transfer from bed <> chair with S with LRAD for time up in chair for completion of ADL activity. 4. Patient will ambulate 150 feet with LRAD/S for improved functional mobility/safe discharge. Attempted to see pt for PT session. RN, [facility], reported pt very agitated today and recommended hold from PT at this time. Will follow up tomorrow as pt schedule allows.

## 2017-09-17 NOTE — PROGRESS NOTES
2015 09/16/17 1923   Vital Signs   Temp 98.3 °F (36.8 °C)   Temp Source Oral   Pulse (Heart Rate) (!) 113   Resp Rate 18   O2 Sat (%) 96 %   Level of Consciousness Alert   BP (!) 186/93   MAP (Calculated) 124   BP 1 Method Automatic   BP 1 Location Right arm   BP Patient Position At rest   MEWS Score 3   Unable to administer IV Metoprolol, Unable to obtained peripheral site, Will encourage pt. to take scheduled PO Catapress, and recheck BP    2130: BP recheck, 185/85; Will make another attempt to reinsert IV    2230: IV access obtained, Scheduled Lasix & Metoprolol administered, Pt. Refused several  Scheduled PM meds, C/o nausea, dry heaving, no emesis present, PRN Zofran   administered earlier    0016: Pt. Agreed to take scheduled Seroquel and is resting quietly, In order to promote sleep and decrease environmental stimuli CT-Head to be done in AM     09/17/17 0026   Vital Signs   Temp 97.3 °F (36.3 °C)   Temp Source Axillary   Pulse (Heart Rate) 84   Resp Rate 18   O2 Sat (%) 91 %   Level of Consciousness Alert   /75   MAP (Calculated) 102   BP 1 Method Automatic   BP 1 Location Left arm   BP Patient Position At rest   MEWS Score 1   BP stable, Pt. Resting quietly with eyes closed       09/17/17 0315   Vital Signs   Temp 98.6 °F (37 °C)   Temp Source Oral   Pulse (Heart Rate) (!) 109   Heart Rate Source Monitor   Cardiac Rhythm Sinus Tach   Resp Rate 20   O2 Sat (%) 94 %   Level of Consciousness Alert   /62  (lopressor given)   MAP (Calculated) 98   MEWS Score 2   Lopressor administered, will reassess     0445: Bp rechecked 181/73, Dr Miladis Major made aware, instructed to change lopressor to 5mg and given now, Bladder scan preformed for suspected urinary retention, 609ml present in bladder Dr Miladis Major made aware, instructed to straight cath. Pt, Pt.  Began to urinate right before catheterization was performed, Dr. Miladis Major made aware Potassium 2.8, instructed to give 4 runs of potassium 10meq IV, orders read back and verified     0500:   Pt. Able to void large amount x 2, straight cath. held     09/17/17 0617   Vital Signs   /54   MAP (Calculated) 90     0730: Bedside shift change report given to LANA Dunn (oncoming nurse) by Mushtaq Fernandez (offgoing nurse). Report included the following information SBAR and Kardex.

## 2017-09-18 PROBLEM — Z74.09 IMPAIRED MOBILITY AND ADLS: Status: ACTIVE | Noted: 2017-09-18

## 2017-09-18 PROBLEM — F05 ACUTE HYPERACTIVE DELIRIUM DUE TO MULTIPLE ETIOLOGIES: Status: ACTIVE | Noted: 2017-09-15

## 2017-09-18 PROBLEM — R00.1 BRADYCARDIA, DRUG INDUCED: Status: ACTIVE | Noted: 2017-06-27

## 2017-09-18 PROBLEM — Z78.9 IMPAIRED MOBILITY AND ADLS: Status: ACTIVE | Noted: 2017-09-18

## 2017-09-18 PROBLEM — R91.1 PULMONARY NODULE: Status: ACTIVE | Noted: 2017-07-11

## 2017-09-18 PROBLEM — R33.9 URINARY RETENTION: Status: ACTIVE | Noted: 2017-09-18

## 2017-09-18 PROBLEM — T50.905A BRADYCARDIA, DRUG INDUCED: Status: ACTIVE | Noted: 2017-06-27

## 2017-09-18 PROBLEM — Z91.81 AT HIGH RISK FOR FALLS: Status: ACTIVE | Noted: 2017-08-24

## 2017-09-18 PROBLEM — H35.3221 EXUDATIVE AGE-RELATED MACULAR DEGENERATION OF LEFT EYE WITH ACTIVE CHOROIDAL NEOVASCULARIZATION (HCC): Status: ACTIVE | Noted: 2017-04-27

## 2017-09-18 PROBLEM — N13.9 LOWER OBSTRUCTIVE UROPATHY: Status: ACTIVE | Noted: 2017-09-18

## 2017-09-18 LAB
ALBUMIN SERPL-MCNC: 3.2 G/DL (ref 3.4–5)
ANION GAP SERPL CALC-SCNC: 10 MMOL/L (ref 3–18)
BASOPHILS # BLD: 0 K/UL (ref 0–0.1)
BASOPHILS NFR BLD: 0 % (ref 0–3)
BUN SERPL-MCNC: 128 MG/DL (ref 7–18)
BUN/CREAT SERPL: 37 (ref 12–20)
CALCIUM SERPL-MCNC: 9.7 MG/DL (ref 8.5–10.1)
CHLORIDE SERPL-SCNC: 98 MMOL/L (ref 100–108)
CO2 SERPL-SCNC: 32 MMOL/L (ref 21–32)
CREAT SERPL-MCNC: 3.49 MG/DL (ref 0.6–1.3)
CREAT UR-MCNC: 72.88 MG/DL (ref 30–125)
DIFFERENTIAL METHOD BLD: ABNORMAL
EOSINOPHIL # BLD: 0 K/UL (ref 0–0.4)
EOSINOPHIL NFR BLD: 0 % (ref 0–5)
ERYTHROCYTE [DISTWIDTH] IN BLOOD BY AUTOMATED COUNT: 18.9 % (ref 11.6–14.5)
GLUCOSE BLD STRIP.AUTO-MCNC: 134 MG/DL (ref 70–110)
GLUCOSE BLD STRIP.AUTO-MCNC: 169 MG/DL (ref 70–110)
GLUCOSE BLD STRIP.AUTO-MCNC: 186 MG/DL (ref 70–110)
GLUCOSE BLD STRIP.AUTO-MCNC: 201 MG/DL (ref 70–110)
GLUCOSE SERPL-MCNC: 203 MG/DL (ref 74–99)
HCT VFR BLD AUTO: 39.9 % (ref 35–45)
HGB BLD-MCNC: 13.1 G/DL (ref 12–16)
LYMPHOCYTES # BLD: 3 K/UL (ref 0.8–3.5)
LYMPHOCYTES NFR BLD: 8 % (ref 20–51)
MAGNESIUM SERPL-MCNC: 3 MG/DL (ref 1.6–2.6)
MCH RBC QN AUTO: 24.3 PG (ref 24–34)
MCHC RBC AUTO-ENTMCNC: 32.8 G/DL (ref 31–37)
MCV RBC AUTO: 73.9 FL (ref 74–97)
MONOCYTES # BLD: 1.5 K/UL (ref 0–1)
MONOCYTES NFR BLD: 4 % (ref 2–9)
NEUTS SEG # BLD: 32.8 K/UL (ref 1.8–8)
NEUTS SEG NFR BLD: 88 % (ref 42–75)
PHOSPHATE SERPL-MCNC: 4.8 MG/DL (ref 2.5–4.9)
PLATELET # BLD AUTO: 496 K/UL (ref 135–420)
PMV BLD AUTO: 8.9 FL (ref 9.2–11.8)
POTASSIUM SERPL-SCNC: 3.4 MMOL/L (ref 3.5–5.5)
RBC # BLD AUTO: 5.4 M/UL (ref 4.2–5.3)
RBC MORPH BLD: ABNORMAL
RBC MORPH BLD: ABNORMAL
SODIUM SERPL-SCNC: 140 MMOL/L (ref 136–145)
SODIUM UR-SCNC: 40 MMOL/L (ref 20–110)
WBC # BLD AUTO: 37.3 K/UL (ref 4.6–13.2)

## 2017-09-18 PROCEDURE — 83735 ASSAY OF MAGNESIUM: CPT | Performed by: INTERNAL MEDICINE

## 2017-09-18 PROCEDURE — 77030034849

## 2017-09-18 PROCEDURE — 74011000250 HC RX REV CODE- 250: Performed by: INTERNAL MEDICINE

## 2017-09-18 PROCEDURE — 84300 ASSAY OF URINE SODIUM: CPT | Performed by: INTERNAL MEDICINE

## 2017-09-18 PROCEDURE — 36415 COLL VENOUS BLD VENIPUNCTURE: CPT | Performed by: INTERNAL MEDICINE

## 2017-09-18 PROCEDURE — 80069 RENAL FUNCTION PANEL: CPT | Performed by: INTERNAL MEDICINE

## 2017-09-18 PROCEDURE — 74011250636 HC RX REV CODE- 250/636: Performed by: INTERNAL MEDICINE

## 2017-09-18 PROCEDURE — 82962 GLUCOSE BLOOD TEST: CPT

## 2017-09-18 PROCEDURE — 74011250637 HC RX REV CODE- 250/637: Performed by: INTERNAL MEDICINE

## 2017-09-18 PROCEDURE — 65660000000 HC RM CCU STEPDOWN

## 2017-09-18 PROCEDURE — 97530 THERAPEUTIC ACTIVITIES: CPT

## 2017-09-18 PROCEDURE — 74011000258 HC RX REV CODE- 258: Performed by: HOSPITALIST

## 2017-09-18 PROCEDURE — 82570 ASSAY OF URINE CREATININE: CPT | Performed by: INTERNAL MEDICINE

## 2017-09-18 PROCEDURE — 74011250637 HC RX REV CODE- 250/637: Performed by: HOSPITALIST

## 2017-09-18 PROCEDURE — 74011636637 HC RX REV CODE- 636/637: Performed by: HOSPITALIST

## 2017-09-18 PROCEDURE — 77010033678 HC OXYGEN DAILY

## 2017-09-18 PROCEDURE — 92526 ORAL FUNCTION THERAPY: CPT

## 2017-09-18 PROCEDURE — 76450000000

## 2017-09-18 PROCEDURE — 85025 COMPLETE CBC W/AUTO DIFF WBC: CPT | Performed by: HOSPITALIST

## 2017-09-18 RX ORDER — FACIAL-BODY WIPES
10 EACH TOPICAL
Status: COMPLETED | OUTPATIENT
Start: 2017-09-18 | End: 2017-09-18

## 2017-09-18 RX ORDER — METOPROLOL TARTRATE 25 MG/1
25 TABLET, FILM COATED ORAL EVERY 12 HOURS
Status: DISCONTINUED | OUTPATIENT
Start: 2017-09-18 | End: 2017-09-20 | Stop reason: HOSPADM

## 2017-09-18 RX ORDER — DEXTROSE MONOHYDRATE AND SODIUM CHLORIDE 5; .45 G/100ML; G/100ML
50 INJECTION, SOLUTION INTRAVENOUS CONTINUOUS
Status: DISCONTINUED | OUTPATIENT
Start: 2017-09-18 | End: 2017-09-19

## 2017-09-18 RX ORDER — DOCUSATE SODIUM 100 MG/1
100 CAPSULE, LIQUID FILLED ORAL 2 TIMES DAILY
Status: DISCONTINUED | OUTPATIENT
Start: 2017-09-18 | End: 2017-09-19

## 2017-09-18 RX ADMIN — MORPHINE SULFATE 1 MG: 2 INJECTION, SOLUTION INTRAMUSCULAR; INTRAVENOUS at 13:12

## 2017-09-18 RX ADMIN — CLOTRIMAZOLE AND BETAMETHASONE DIPROPIONATE: 10; .5 CREAM TOPICAL at 22:28

## 2017-09-18 RX ADMIN — HEPARIN SODIUM 5000 UNITS: 5000 INJECTION, SOLUTION INTRAVENOUS; SUBCUTANEOUS at 10:58

## 2017-09-18 RX ADMIN — INSULIN LISPRO 4 UNITS: 100 INJECTION, SOLUTION INTRAVENOUS; SUBCUTANEOUS at 06:25

## 2017-09-18 RX ADMIN — INSULIN LISPRO 4 UNITS: 100 INJECTION, SOLUTION INTRAVENOUS; SUBCUTANEOUS at 12:58

## 2017-09-18 RX ADMIN — METOROPROLOL TARTRATE 5 MG: 5 INJECTION, SOLUTION INTRAVENOUS at 02:08

## 2017-09-18 RX ADMIN — PANTOPRAZOLE SODIUM 40 MG: 40 TABLET, DELAYED RELEASE ORAL at 13:19

## 2017-09-18 RX ADMIN — BISACODYL 10 MG: 10 SUPPOSITORY RECTAL at 00:51

## 2017-09-18 RX ADMIN — LEVOTHYROXINE SODIUM 150 MCG: 150 TABLET ORAL at 06:23

## 2017-09-18 RX ADMIN — METOROPROLOL TARTRATE 5 MG: 5 INJECTION, SOLUTION INTRAVENOUS at 06:23

## 2017-09-18 RX ADMIN — DOCUSATE SODIUM 100 MG: 100 CAPSULE, LIQUID FILLED ORAL at 11:39

## 2017-09-18 RX ADMIN — DOCUSATE SODIUM 100 MG: 100 CAPSULE, LIQUID FILLED ORAL at 22:28

## 2017-09-18 RX ADMIN — METOPROLOL TARTRATE 25 MG: 25 TABLET ORAL at 22:28

## 2017-09-18 RX ADMIN — MIRTAZAPINE 30 MG: 15 TABLET, ORALLY DISINTEGRATING ORAL at 22:27

## 2017-09-18 RX ADMIN — HEPARIN SODIUM 5000 UNITS: 5000 INJECTION, SOLUTION INTRAVENOUS; SUBCUTANEOUS at 02:08

## 2017-09-18 RX ADMIN — ASPIRIN 81 MG: 81 TABLET, COATED ORAL at 08:16

## 2017-09-18 RX ADMIN — DEXTROSE MONOHYDRATE AND SODIUM CHLORIDE 50 ML/HR: 5; .45 INJECTION, SOLUTION INTRAVENOUS at 15:29

## 2017-09-18 RX ADMIN — METOPROLOL TARTRATE 25 MG: 25 TABLET ORAL at 10:57

## 2017-09-18 RX ADMIN — CLONIDINE HYDROCHLORIDE 0.2 MG: 0.1 TABLET ORAL at 22:27

## 2017-09-18 RX ADMIN — SODIUM PHOSPHATE, DIBASIC AND SODIUM PHOSPHATE, MONOBASIC 118 ML: 7; 19 ENEMA RECTAL at 15:29

## 2017-09-18 RX ADMIN — AMLODIPINE BESYLATE 10 MG: 5 TABLET ORAL at 08:16

## 2017-09-18 RX ADMIN — CLONIDINE HYDROCHLORIDE 0.2 MG: 0.1 TABLET ORAL at 08:16

## 2017-09-18 RX ADMIN — QUETIAPINE FUMARATE 25 MG: 25 TABLET, FILM COATED ORAL at 22:28

## 2017-09-18 RX ADMIN — TRAMADOL HYDROCHLORIDE 50 MG: 50 TABLET, FILM COATED ORAL at 10:57

## 2017-09-18 RX ADMIN — HEPARIN SODIUM 5000 UNITS: 5000 INJECTION, SOLUTION INTRAVENOUS; SUBCUTANEOUS at 21:19

## 2017-09-18 RX ADMIN — ISOSORBIDE MONONITRATE 30 MG: 30 TABLET, EXTENDED RELEASE ORAL at 22:28

## 2017-09-18 RX ADMIN — INSULIN GLARGINE 50 UNITS: 100 INJECTION, SOLUTION SUBCUTANEOUS at 08:16

## 2017-09-18 RX ADMIN — INSULIN LISPRO 2 UNITS: 100 INJECTION, SOLUTION INTRAVENOUS; SUBCUTANEOUS at 17:57

## 2017-09-18 RX ADMIN — CLOPIDOGREL BISULFATE 75 MG: 75 TABLET ORAL at 08:16

## 2017-09-18 RX ADMIN — ISOSORBIDE MONONITRATE 30 MG: 30 TABLET, EXTENDED RELEASE ORAL at 08:16

## 2017-09-18 NOTE — PROGRESS NOTES
INITIAL NUTRITION ASSESSMENT     RECOMMENDATIONS/PLAN:   -Recc EN TwoCal @ 35ml/hr to provide 1540 total calories, 64g PRO, 539 H20, 168g CHO, 70g Fat  -Add Phos & Mg labs  -Will monitor for adj to reccs  -Monitor labs/lytes, PO intakes, wt, fluid status, skin integrity       REASON FOR ASSESSMENT:     [x] LOS    NUTRITION ASSESSMENT:   Client History: 80 yrs old Female admitted with SOB. Pt is noted to have localized CA of lip, oral cavity, and throat. Per MD note ARF is getting worse. Spoke w/ pt who reported not eating since admission because she has thrown up her food. Recc EN nutrition because it seems like pt has set from talking to her that she cannot eat because she will get sick. PMHx: Anxiety, Arthritis, CAD, chest pain, DM, essential HTN, Hiatal Hernia, hyperlipidemia, hypothyroidism, systolic HTN     Cultural/Ep[employer]alian Food Preferences: None Identified    FOOD/NUTRITION HISTORY  Diet History: Spoke w/ pt who reported not eating since admission because she has thrown up her food.   Food Allergies:  [x] NKFA     Pertinent PTA Medications: lasix, melatonin, Calcium Carbonate, Mag-Ox,  Vit D, Vit B6, protonix, Lantus    NUTRITION INTAKE   Diet Order:  Puree w/ Nepro   Average PO Intake:       Patient Vitals for the past 100 hrs:   % Diet Eaten   09/17/17 1920 0 %   09/17/17 1510 0 %   09/16/17 1800 0 %   09/16/17 1500 0 %   09/16/17 0815 0 %   09/15/17 1925 10 %   09/15/17 1700 10 %   09/15/17 1300 0 %   09/15/17 0900 5 %   09/14/17 1747 0 %   09/14/17 1142 50 %   09/14/17 0859 30 %      Pertinent Medications:  [x] Reviewed; tums, vit d3,heparin, haldol, mag-ox, melatonin, remeron, protonix, miralax, vit b6  Insulin:  [] SSI  [x] Pre-meal   []  Basal   [] Drip  [] None  Pt expected to meet estimated nutrient needs through next review:          []  Yes     [x] No; pt appetite remains very low  ANTHROPOMETRICS  Height: 5' (152.4 cm)       Weight: 77 kg (169 lb 12.8 oz)    BMI: 33.2 kg/m^2  -  obese (30%-39.9% BMI)        Weight change: pt was 185# on 7/16/2016 pt is currently 169# which is  -8.65% wt loss in a year which is not significant                                  Comparison to Reference Standards:  IBW: 100 lbs      %IBW: 169%      AdjBW: 53.3 kg    NUTRITION-FOCUSED PHYSICAL ASSESSMENT  Skin: No pressure injury noted . GI: +BM 9/18/17    BIOCHEMICAL DATA & MEDICAL TESTS  Pertinent Labs:  [x] Reviewed; K-3.4     NUTRITION PRESCRIPTION  Calories: 5788-0657 kcal/day based on 30-35kcal/kg of IBW  Protein: 45-59 g/day based on 1.0-1.3 g/kg  CHO:169-197 g/day based on 50% of total energy  Fluid: 0803-0857 ml/day based on 1 kcal/ml      NUTRITION DIAGNOSES:   1. Inadequate oral intake related to swallowing difficulties as evidence by <15% PO intake in 3 days. NUTRITION INTERVENTIONS:   INTERVENTIONS:        GOALS:  1. Formula/Solution:TwoCal @ 35ml/hr to provide 1540 total calories, 64g PRO, 539 H20, 168g CHO, 70g Fat 1. Meet estimated needs by consuming >50% if not able start EN to meet estimated needs by next review 2 days   2. Labs: Add Phos & Mg labs          LEARNING NEEDS (Diet, Supplementation, Food/Nutrient-Drug Interaction):   [x] None Identified  [] Inpatient education provided/documented    [] Identified and patient:  [] Declined     [] Was not appropriate/indicated  NUTRITION MONITORING /EVALUATION:   Follow PO intake  Monitor wt  Monitor renal labs, electrolytes, fluid status  Monitor for additional supplement needs    [] Participated in Interdisciplinary Rounds  [x] Interdisciplinary Care Plan Reviewed/Documented  DISCHARGE NUTRITION RECOMMENDATIONS ADDRESSED:      [x] To be determined closer to discharge    NUTRITION RISK:     [x]  At risk                     []  Not currently at risk     Will follow-up per policy.   Nic Morley RD  PAGER:  [phone]

## 2017-09-18 NOTE — PROGRESS NOTES
conducted a Follow up consultation and Spiritual Assessment for Brittany Bowens, who is a 80 y.o.,female. The  provided the following Interventions:  Continued the relationship of care and support. Listened empathically. Offered prayer and assurance of continued prayer on patients behalf. Chart reviewed. The following outcomes were achieved:  Patient expressed gratitude for pastoral care visit. Assessment:  There are no spiritual or Mormonism issues which require intervention at this time. Plan:  Chaplains will continue to follow and will provide pastoral care on an as needed/requested basis.  recommends bedside caregivers page  on duty if patient shows signs of acute spiritual or emotional distress.        Sister Sujatha Fuentes, [place], [address]  [phone]

## 2017-09-18 NOTE — PROGRESS NOTES
conducted a Follow up consultation and Spiritual Assessment for Larry Mac, who is a 80 y.o.,female. Continued the relationship of care and support. Listened empathically. Offered prayer and assurance of continued prayer on patients behalf. Plan:  Chaplains will continue to follow and will provide pastoral care as needed or requested.  recommends bedside caregivers page the  on duty if patient shows signs of acute spiritual or emotional distress. Father CHARITY PowellDiv.   [facility] - Office

## 2017-09-18 NOTE — PROGRESS NOTES
Hospitalist Progress Note-critical care note     Patient: Sherry John MRN: [id number]  CSN: [id number]    YOB: 1932  Age: 80 y.o.   Sex: female    DOA: 9/10/2017 LOS:  LOS: 8 days            Chief complaint: chf exacerbation, kathrine on ckd, carcinoma, acute encephalopathy   Stridor, anxiety   Assessment/Plan         Hospital Problems  Date Reviewed: 9/11/2017          Codes Class Noted POA    Lower obstructive uropathy ICD-10-CM: N13.9  ICD-9-CM: 599.60  9/18/2017 Unknown        Urinary retention ICD-10-CM: R33.9  ICD-9-CM: 788.20  9/18/2017 Unknown        Acute hyperactive delirium due to multiple etiologies ICD-10-CM: F05  ICD-9-CM: 293.0  9/15/2017 Unknown        Dysphagia ICD-10-CM: R13.10  ICD-9-CM: 787.20  9/13/2017 Unknown        Stridor ICD-10-CM: R06.1  ICD-9-CM: 786.1  9/11/2017 Unknown        Localized cancer of lip, oral cavity and throat ([facility] ICD-10-CM: C14.8  ICD-9-CM: 149.8  9/11/2017 Yes    Overview Signed 9/11/2017  1:48 AM by Krissy Evangelista MD     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 9/2017             Anxiety ICD-10-CM: F41.9  ICD-9-CM: 300.00  9/11/2017 Unknown        Heart failure ([facility] ICD-10-CM: I50.9  ICD-9-CM: 428.9  9/11/2017 Unknown        Acute renal failure superimposed on stage 4 chronic kidney disease ([facility] ICD-10-CM: N17.9, N18.4  ICD-9-CM: 584.9, 585.4  9/11/2017 Unknown        Hyperkalemia ICD-10-CM: E87.5  ICD-9-CM: 276.7  9/10/2017 Unknown        CKD (chronic kidney disease) stage 4, GFR 15-29 ml/min ([facility]) ICD-10-CM: N18.4  ICD-9-CM: 585.4  3/12/2017 Yes        KATHRINE (acute kidney injury) ([facility] ICD-10-CM: N17.9  ICD-9-CM: 584.9  7/17/2016 Yes        DM (diabetes mellitus) ([facility] ICD-10-CM: E11.9  ICD-9-CM: 250.00  7/17/2016 Yes        * (Principal)Diastolic congestive heart failure ([facility] ICD-10-CM: I50.30  ICD-9-CM: 428.30, 428.0  7/16/2016 Unknown        Shortness of breath ICD-10-CM: R06.02  ICD-9-CM: 786.05 5/3/2015 Yes            1 acute encephalopathy   Improving, will continue  Quetiapine at night   2. CHF (congestive heart failure) ([facility])   Diuretics hold due to worsening renal function   Ef wnl, diastolic dysfunction          3 PAM (acute kidney injury) on ckd4   Cr worsening today, urinary retention   Rocha in , need closely monitor in/out put       4   DM (diabetes mellitus) ([facility] (7/17/2016)    Continue  lantus and premeal insulin. ssi       4  Hyperkalemia (9/10/2017)   resolved. 5. stridor   No airway problem    was on  steroid and abx   Speech  On board   6. 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 - will hold radiation per oncology     6. Anxiety (9/11/2017)  Continue ativan prn   7 Dysphagia ; dysohagia diet ,   8 malnutrition   Refused to eat, iv d5 low rate   9 constipation  kub no obstruction   10 leukocytosis   Was on steroid   Will continue monitor cbc , repeat today   11 urinary retention  Rocha in   Subjective: I do not want to eat  Nurse : refused to eat     She does not improving clinically, will have palliative care team on board    Review of systems:    General: No fevers or chills. Cardiovascular: No chest pain or pressure. No palpitations. Pulmonary:  shortness of breath is better   Gastrointestinal: No nausea, vomiting. Vital signs/Intake and Output:  Visit Vitals    /41    Pulse 85    Temp 97.7 °F (36.5 °C)    Resp 18    Ht 5' (1.524 m)    Wt 77 kg (169 lb 12.8 oz)    SpO2 98%    BMI 33.16 kg/m2     Current Shift:  09/18 0701 - 09/18 1900  In: 720 [P.O.:720]  Out: 750 [Urine:750]  Last three shifts:  09/16 1901 - 09/18 0700  In: 480 [P.O.:480]  Out: -     Physical Exam:  General: WD, WN. Alert, cooperative, no acute distress    HEENT: NC, swelling and enlarged  of chin   PERRLA, anicteric sclerae. Lungs: CTA Bilaterally. No Wheezing/Rhonchi/Rales.   Heart:  Regular  rhythm,  No murmur, No Rubs, No Gallops  Abdomen: Soft, Non distended, Non tender.  +Bowel sounds,   Extremities: No c/c/e  Psych:   No anxious , no agitated. Neurologic:  No acute neurological deficit. Labs: Results:       Chemistry Recent Labs      09/18/17   0359  09/17/17   1505  09/17/17   0303  09/16/17   0240   GLU  203*  198*  187*  98   NA  140  140  143  142   K  3.4*  3.3*  2.8*  3.4*   CL  98*  98*  97*  97*   CO2  32  36*  37*  33*   BUN  128*  105*  95*  71*   CREA  3.49*  2.58*  2.36*  1.79*   CA  9.7  9.8  10.3*  11.1*   AGAP  10  6  9  12   BUCR  37*  41*  40*  40*   ALB  3.2*   --   3.6  4.0      CBC w/Diff Recent Labs      09/15/17   1534   WBC  23.1*   RBC  5.92*   HGB  14.5   HCT  43.6   PLT  608*   GRANS  85*   LYMPH  8*   EOS  0      Cardiac Enzymes No results for input(s): CPK, CKND1, DORITA in the last 72 hours. No lab exists for component: CKRMB, TROIP   Coagulation No results for input(s): PTP, INR, APTT in the last 72 hours. No lab exists for component: INREXT, INREXT    Lipid Panel Lab Results   Component Value Date/Time    Cholesterol, total 124 07/17/2016 02:21 AM    HDL Cholesterol 41 07/17/2016 02:21 AM    LDL, calculated 57.6 07/17/2016 02:21 AM    VLDL, calculated 25.4 07/17/2016 02:21 AM    Triglyceride 127 07/17/2016 02:21 AM    CHOL/HDL Ratio 3.0 07/17/2016 02:21 AM      BNP No results for input(s): BNPP in the last 72 hours.    Liver Enzymes Recent Labs      09/18/17   0359   ALB  3.2*      Thyroid Studies Lab Results   Component Value Date/Time    TSH 1.92 03/11/2017 04:41 AM        Procedures/imaging: see electronic medical records for all procedures/Xrays and details which were not copied into this note but were reviewed prior to creation of Leonardo Yadav MD

## 2017-09-18 NOTE — DIABETES MGMT
GLYCEMIC CONTROL PROGRESS NOTE:    -pt with known h/o T2DM, basal/bolus home regimen  -BG out of target range non-ICU: < 140 mg/dL, requiring corrective coverage  -TDD = 56 units (50 Lantus + 6 - Humalog Normal Insulin Sensitivity Corrective Coverage)  -mealtime insulin not given yesterday r/t poor po intake  - Intake:   Patient Vitals for the past 100 hrs:   % Diet Eaten   09/17/17 1920 0 %   09/17/17 1510 0 %   09/16/17 1800 0 %   09/16/17 1500 0 %   09/16/17 0815 0 %   09/15/17 1925 10 %   09/15/17 1700 10 %   09/15/17 1300 0 %   09/15/17 0900 5 %   09/14/17 1747 0 %   09/14/17 1142 50 %   09/14/17 0859 30 %      Recommendations:    *continue current regimen of Lantus 50 units + Humalog 8 units qac   *encourage po intake    Recent Glucose Results:   Lab Results   Component Value Date/Time     (H) 09/18/2017 03:59 AM     (H) 09/17/2017 03:05 PM    GLUCPOC 201 (H) 09/18/2017 06:16 AM    GLUCPOC 126 (H) 09/17/2017 09:45 PM    GLUCPOC 189 (H) 09/17/2017 04:22 PM     Angela Ruffin RN, MS  Glycemic Control Team

## 2017-09-18 NOTE — ROUTINE PROCESS
Bedside shift change report given to BLAKE pacheco (oncoming nurse) by MEMO Irizarry (offgoing nurse). Report included the following information SBAR and Kardex.

## 2017-09-18 NOTE — PROGRESS NOTES
Palliative Medicine    Palliative team met with patient and spoke with her daughter by phone. Meeting planned with both in person 9/19 at [place] [address]. Both confirmed patient's DDNR scanned into her record reflects her wishes regarding CPR. Code status changed accordingly.       Anita Petersen MD  Palliative Medicine

## 2017-09-18 NOTE — PROGRESS NOTES
0757 Assumed care of patient from [facility] 36. Johnathanbelkis Burr. Patient appears more lucid today, calm and alert, no signs of distress,call bell within reach, bed alarm activated. 9164 Patient able to take half of her oral medications this morning, and unable to take the rest. Complaining of difficulty swallowing but will not allow nurse to crush medications and does not want to try applesauce or pudding. 1057 Ultram 50 mg po prn given for complaints of abdominal pain. See doc-flow sheet for follow up. 121 [facility] Interdisciplinary team rounds were held 9/18/2017 with the following team members:Care Management, Nursing, Pharmacy, Physical Therapy, Physician and clinical nurse specialist and the patient. Plan of care discussed. See clinical pathway and/or care plan for interventions and desired outcomes. Patient complaining of abdominal pain, poor appetite, need to have a bowel movement. Patient refusing to use a bedpan and the bedside commode. Night shift gave prn for constipation and stool softner given today, will continue to monitor. 1312 Morphine 1 mg Iv push prn given for complaints of abdominal cramping. See doc-flow sheet for follow up.    1442 Patient had a uneventful shift. Monitor renal function, encourage po intake, monitor urinary output. Miralax prn for constipation available, stool softener started today. Patient complaining of abdominal pain today, unable to have a bowel movement.

## 2017-09-18 NOTE — PROGRESS NOTES
D/c plan Rehab when medically stable/ not finalized    Met with patient at bedside during IDR. Patient only c/o she needs to have a bm. Plan when medically cleared to return to [facility] states not ready for d/c yet.      will continue to follow

## 2017-09-18 NOTE — PROGRESS NOTES
2731: Shift Summary: Vital signs remained stable; Pt. C/o of abdominal discomfort, abdomen distended and semi-soft on assessment last bowel movement unknown, Dr. Willian Collier made aware, order obtained to administer

## 2017-09-18 NOTE — PROGRESS NOTES
Problem: Dysphagia (Adult)  Goal: *Acute Goals and Plan of Care (Insert Text)  Recommendations:  Diet: Pureed Solids with Thin Liquids   Meds: 1 at a time in puree; crushed if appropriate  Aspiration Precautions  Oral Care TID  Other: breath hold, small sip, and swallow hard    Goals: Patient will:  1. Tolerate PO trials with 0 s/s overt distress in 4/5 trials  2. Utilize compensatory swallow strategies/maneuvers (decrease bite/sip, size/rate, alt. liq/sol) with min cues in 4/5 trials  3. Perform oral-motor/laryngeal exercises to increase oropharyngeal swallow function with min cues  4. Complete an objective swallow study (i.e., MBSS) to assess swallow integrity, r/o aspiration, and determine of safest LRD, min A - Goal met 9/13/2017     Outcome: Progressing Towards Goal  SPEECH LANGUAGE PATHOLOGY DYSPHAGIA TREATMENT     Patient: Larry Mac (82 y.o. female)  Date: 9/18/2017  Diagnosis: CHF (congestive heart failure) (HCC)  Hyperkalemia  Heart failure (HCC) Diastolic congestive heart failure (HCC)       Precautions: Aspiration. Fall      ASSESSMENT:  Patient distressed, but cooperative during dysphagia therapy. <25% of breakfast tray at bedside consumed. Education re:positioning provided as patient was not properly positioned for PO trials. Change in vocal quality when SLP entered the room s/p cup/sip of thin. Discussed the risk of aspiration without utilization of compensatory strategies consistently. Completed swallowing exercises. Notified RN Erin Silva. Progression toward goals:  [ ]         Improving appropriately and progressing toward goals  [X]         Improving slowly and progressing toward goals  [ ]         Not making progress toward goals and plan of care will be adjusted       PLAN:  Recommendations and Planned Interventions:  As above. Patient continues to benefit from skilled intervention to address the above impairments. Continue treatment per established plan of care.   Discharge Recommendations:  Skilled Nursing Facility vs To Be Determined       SUBJECTIVE:   Patient stated I am in so much pain; I might die. OBJECTIVE:   Cognitive and Communication Status:  Neurologic State: Alert  Orientation Level: Disoriented to situation, Disoriented to time, Oriented to person, Oriented to place  Cognition: Follows commands, Memory loss, Poor safety awareness  Perception: Appears intact  Perseveration: No perseveration noted  Safety/Judgement: Decreased insight into deficits  Dysphagia Treatment:  Oral Assessment:  Oral Assessment  Labial: No impairment  Dentition: Upper & lower dentures  Oral Hygiene: good  Lingual: Decreased rate, Decreased strength, Incoordinated  Velum: No impairment  Mandible: Restricted  Gag Reflex: Present                  Oral Phase Severity: Moderate              Pharyngeal Phase Severity : Mild-moderate     Exercises:  Laryngeal Exercises:  Effortful Swallow: Yes  Sets : 2  Reps : 5     PAIN:  Start of Tx: 9/10 in stomach  End of Tx: 9/10 in stomach      After treatment:   [ ]              Patient left in no apparent distress sitting up in chair  [ ]              Patient left in no apparent distress in bed  [X]              Call bell left within reach  [X]              Nursing notified  [ ]              Family present  [ ]              Caregiver present  [X]              Bed alarm activated         COMMUNICATION/EDUCATION:   [X]        Aspiration precautions; swallow safety; compensatory techniques  [ ]        Patient unable to participate in education; education ongoing with staff  [ ]         Posted safety precautions in patient's room.   [X]        Oral-motor/laryngeal strengthening exercises        JARED Castillo  Time Calculation: 10 mins

## 2017-09-18 NOTE — PROGRESS NOTES
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 Insert tilley and check urine lytes  Strict In ans outs, stop diuretics. Will add Bicarb tabs    CC: ARF, CKD    Interval History: PT is more awake and no longer confused,      Subjective:   Has lower abdominal pain with pressure but no Dysuria? Review of Systems  Negative for Edema, SOB, Chest pain, Tremors, Nausea, vomiting        Blood pressure 148/53, pulse 76, temperature 97.5 °F (36.4 °C), resp. rate 18, height 5' (1.524 m), weight 77 kg (169 lb 12.8 oz), SpO2 91 %. NAD, Conversant  Eyes: anicteric  Neck: supple, trachea in midline position  Respiratory: good effort, no rales, good breath sounds, no wheezings  Cardiovascular:  Normal rate, regular rythem normal S1 S2 no rubs or gallops, No edema  GI: soft  Musculoskeletal: No clubbing cynosis, no petechia  Skin: warm and well  perfused  Neruoligcal: no focal dificit   Psychicatric: poor judgment and insights, good memory. No luisito affect. Non anxiouse.         Intake/Output Summary (Last 24 hours) at 09/18/17 0823  Last data filed at 09/18/17 1358   Gross per 24 hour   Intake              300 ml   Output                0 ml   Net              300 ml      Recent Labs      09/15/17   1534   WBC  23.1*     Lab Results   Component Value Date/Time    Sodium 140 09/18/2017 03:59 AM    Potassium 3.4 09/18/2017 03:59 AM    Chloride 98 09/18/2017 03:59 AM    CO2 32 09/18/2017 03:59 AM    Anion gap 10 09/18/2017 03:59 AM    Glucose 203 09/18/2017 03:59 AM     09/18/2017 03:59 AM    Creatinine 3.49 09/18/2017 03:59 AM    BUN/Creatinine ratio 37 09/18/2017 03:59 AM    GFR est AA 15 09/18/2017 03:59 AM    GFR est non-AA 12 09/18/2017 03:59 AM    Calcium 9.7 09/18/2017 03:59 AM        Current Facility-Administered Medications   Medication Dose Route Frequency Provider Last Rate Last Dose    metoprolol (LOPRESSOR) injection 5 mg  5 mg IntraVENous Q4H Indra Nap, DO   5 mg at 09/18/17 0791    haloperidol lactate (HALDOL) injection 2 mg  2 mg IntraMUSCular Q6H PRN Georgia Claire MD   2 mg at 09/17/17 1713    ondansetron (ZOFRAN) injection 4 mg  4 mg IntraVENous Q6H PRN Georgia Claire MD   4 mg at 09/17/17 2338    levoFLOXacin (LEVAQUIN) tablet 250 mg  250 mg Oral Q24H Anni Alex DO        QUEtiapine (SEROquel) tablet 25 mg  25 mg Oral QHS Vlad Agarwal MD   25 mg at 09/17/17 2200    insulin glargine (LANTUS) injection 50 Units  50 Units SubCUTAneous DAILY Vlad Agarwal MD   50 Units at 09/18/17 0816    insulin lispro (HUMALOG) injection 8 Units  8 Units SubCUTAneous Pepe Braden MD   8 Units at 09/18/17 0816    insulin lispro (HUMALOG) injection   SubCUTAneous AC&HS Vlad Agarwal MD   4 Units at 09/18/17 6546    pyridoxine (vitamin B6) (VITAMIN B-6) tablet 25 mg  25 mg Oral DAILY Anni Alex DO   25 mg at 09/14/17 0079    albuterol-ipratropium (DUO-NEB) 2.5 MG-0.5 MG/3 ML  3 mL Nebulization Q4H PRN Vlad Agarwal MD        mirtazapine (REMERON SOL-TAB) disintegrating tablet 30 mg  30 mg Oral QHS Romell Heimlich, MD   30 mg at 09/14/17 2228    acetaminophen (TYLENOL) tablet 650 mg  650 mg Oral Q4H PRN Romell Heimlich, MD   650 mg at 09/12/17 0634    amLODIPine (NORVASC) tablet 10 mg  10 mg Oral DAILY Romell Heimlich, MD   10 mg at 09/18/17 4416    aspirin delayed-release tablet 81 mg  81 mg Oral DAILY Romell Heimlich, MD   81 mg at 09/18/17 0816    calcium carbonate (TUMS) chewable tablet 400 mg [elemental]  400 mg Oral TID Romell Heimlich, MD Stopped at 09/16/17 0900    cholecalciferol (VITAMIN D3) tablet 1,000 Units  1,000 Units Oral DAILY Keith Seo MD   1,000 Units at 09/14/17 9356    cloNIDine HCl (CATAPRES) tablet 0.2 mg  0.2 mg Oral BID Keith Seo MD   0.2 mg at 09/18/17 0816    clopidogrel (PLAVIX) tablet 75 mg  75 mg Oral DAILY Keith Seo MD   75 mg at 09/18/17 0816    clotrimazole-betamethasone (LOTRISONE) 1-0.05 % cream   Topical BID Keith Seo MD   Stopped at 09/16/17 0900    diphenhydrAMINE (BENADRYL) capsule 25 mg  25 mg Oral Q6H PRN Keith Seo MD        isosorbide mononitrate ER (IMDUR) tablet 30 mg  30 mg Oral BID Keith Seo MD   30 mg at 09/18/17 0816    levothyroxine (SYNTHROID) tablet 150 mcg  150 mcg Oral ACB Keith Seo MD   150 mcg at 09/18/17 3078    magnesium oxide (MAG-OX) tablet 400 mg  400 mg Oral DAILY Keith Seo MD   400 mg at 09/14/17 4368    meclizine (ANTIVERT) tablet 12.5 mg  12.5 mg Oral Q6H PRN Keith Seo MD   12.5 mg at 09/16/17 1751    nitroglycerin (NITROSTAT) tablet 0.4 mg  0.4 mg SubLINGual PRN Keith Seo MD        pantoprazole (PROTONIX) tablet 40 mg  40 mg Oral DAILY Keith Seo MD   40 mg at 09/14/17 3961    pitavastatin calcium (LIVALO) tablet 1 mg  1 mg Oral DAILY Keith Seo MD   1 mg at 09/13/17 0900    polyethylene glycol (MIRALAX) packet 17 g  17 g Oral PRN Keith Seo MD   17 g at 09/17/17 2338    promethazine (PHENERGAN) 6.25 mg/5 mL syrup 6.25 mg  6.25 mg Oral QID PRN Keith Seo MD        traMADol Raynold Hope) tablet 50 mg  50 mg Oral Q6H PRN Keith Seo MD        naloxone [facility]) injection 0.4 mg  0.4 mg IntraVENous PRN Keith Seo MD        heparin (porcine) injection 5,000 Units  5,000 Units SubCUTAneous Q8H Keith Seo MD   5,000 Units at 09/18/17 0208    glucose chewable tablet 16 g  4 Tab Oral PRN Yadira Bone MD Colin        glucagon (GLUCAGEN) injection 1 mg  1 mg IntraMUSCular PRN Edwin Michel MD        dextrose (D50W) injection syrg 12.5-25 g  25-50 mL IntraVENous PRN Edwin Michel MD        morphine injection 1 mg  1 mg IntraVENous Q4H PRN Edwin Michel MD   1 mg at 09/13/17 2153    melatonin tablet 5 mg  5 mg Oral QHS Edwin Michel MD   5 mg at 09/14/17 2228       )Xr Chest Sngl V    Result Date: 9/4/2017  EXAM: AP radiograph of the chest INDICATION: Shortness of breath COMPARISON: March 10, 2017, July 16, 2016  FINDINGS: Normal heart size and mediastinal contour. No consolidation, pleural effusion, or pneumothorax. No acute osseous abnormalities. IMPRESSION: No acute pulmonary findings     Xr Knee Lt Min 4 V    Result Date: 8/1/2017  Indication: Fall, pain. Comments: AP, lateral, and bilateral oblique views of the left knee are submitted for evaluation. There is no evidence of fracture or dislocation. No joint effusion is identified. There is small superior and inferior patella enthesophytes. Minimal articular surface irregularity of the patella. Atherosclerotic calcifications noted. The soft tissue structures are unremarkable. Osseous mineralization is normal.     IMPRESSION: No evidence for acute fracture or dislocation. Mild degenerative changes and atherosclerosis. Xr Abd (kub)    Result Date: 9/16/2017  EXAM: Single view abdomen x-ray INDICATION: Abdominal pain and vomiting. COMPARISON: 8/1/2017. TECHNIQUE: Supine view of the abdomen was performed. FINDINGS: Contrast material in the colon from recent barium swallow study prominent gas-filled loops of small bowel in the right hemiabdomen without evidence of yared obstruction. There are no suspicious calcifications in the abdomen or pelvis. Evaluation of free air is limited due to supine technique. Multilevel degenerative changes in the lumbar spine.     IMPRESSION: Nonspecific gas-filled loops of small bowel without yared evidence of bowel obstruction. Xr Swallow Func Video    Result Date: 9/13/2017  Modified Barium swallow History: Dysphasia with cough, feeding difficulties. Technique: The patient orally ingested various barium materials under the direction of a speech pathologist with direct fluoroscopic observation. Total fluoroscopy time: 36 seconds; fluoroscopy dose: 4.37mGy reference in air kerma Findings: The patient ingested thin, pudding, and a barium coated pill consistencies. Laryngeal aspiration occurred with thin liquids administered by straw. The aspiration resulted in a cough reflex. Penetration to the vocal cords was noted with thin liquids administered by both cup and straw. Premature spillage was noted with any consistency. Oropharyngeal phase was unremarkable. IMPRESSION: 1. Aspiration with cough reflex related to thin liquids administered by straw. Laryngeal penetration to the level of the vocal cords noted within the contents of the cervical and strut. Please refer to the separate report and recommendations from the speech pathologist.    Ct Head Wo Cont    Result Date: 9/17/2017  EXAM: CT head INDICATION: Altered mental status. COMPARISON: 8/1/2017 TECHNIQUE: Axial CT imaging of the head was performed without intravenous contrast. One or more dose reduction techniques were used on this CT: automated exposure control, adjustment of the mAs and/or kVp according to patient's size, and iterative reconstruction techniques. The specific techniques utilized on this CT exam have been documented in the patient's electronic medical record. FINDINGS: BRAIN AND POSTERIOR FOSSA: There is no intracranial hemorrhage, mass effect, or midline shift. There is a mild degree of sulcal enlargement and ventricular dilatation consistent with cortical atrophy. There is hypoattenuation of the periventricular white matter, which is nonspecific but most likely represents changes from chronic microangiopathy. EXTRA-AXIAL SPACES AND MENINGES: There are no abnormal extra-axial fluid collections. CALVARIUM: Intact. SINUSES: Clear. OTHER: None. IMPRESSION: 1. No acute intracranial process, specifically, no evidence of intracranial hemorrhage, mass effect, or midline shift. 2. Senescent changes of the brain including cortical atrophy and findings most suggestive of chronic microvascular ischemia. Please note that CT may be negative in the setting of acute infarction. Ct Head Wo Cont    Result Date: 8/1/2017  CT scan of the head without IV contrast Images: 154  HISTORY: Status post fall. TECHNIQUE: Serial axial images were obtained from the foramen magnum to the skull vertex without IV contrast. One or more dose reduction techniques were used on this CT: automated exposure control, adjustment of the mAs and/or kVp according to patient's size, and iterative reconstruction techniques. The specific techniques utilized on this CT exam have been documented in the patient's electronic medical record. COMPARISON:  None. FINDINGS: The sulci, ventricles, and basal cisterns are within normal limits for age with age concordant atrophy. There is no intracranial hemorrhage, mass-effect, or midline shift. No extra-axial fluid collections are identified. Periventricular white matter hypodensities are noted. These are nonspecific but often seen with chronic small vessel ischemic change. Otherwise, there are no significant areas of abnormal parenchymal attenuation. The visualized portions of the paranasal sinuses and mastoid air cells show no air-fluid levels. The visualized bones are intact. IMPRESSION: No evidence of acute traumatic injury to the brain or cranium. Atrophy and deep white matter changes are identified. Xr Chest Port    Result Date: 9/15/2017  History: CHF Comparison: 9/10/2017.  Exam performed AP portable at 1542. Findings: There is relatively poor respiratory effort today. Cardiomegaly remains. Pulmonary vascularity appears normal. There is some subsegmental atelectasis right lower lobe new from prior. No effusion. Patient's chin obscures the apices. No significant pneumothorax. Impression: 1. The chest appears improved. Findings of congestive failure have reduced. 2. Minor atelectasis right lower lobe. Xr Chest Port    Result Date: 9/11/2017  EXAM: Chest portable INDICATION: Short of breath COMPARISON: Single view chest 9/3/2017  FINDINGS: AP portable chest film was performed. Suboptimal inspiration similar to previous exam. There are new bilaterally symmetrically increased interstitial markings. No focal infiltrate. No definite effusion. No pneumothorax. Heart is at the upper limits of normal. Mild central vascular congestion. IMPRESSION: 1. New symmetrically increased interstitial infiltrates. Differential includes acute onset interstitial pulmonary edema which is felt to be most likely. Acute interstitial pneumonitis is felt to be less likely. Xr Spine Lumb Trauma 2 V    Result Date: 8/1/2017  AP AND LATERAL LUMBAR SPINE: Indication:  Status post fall. Back pain. AP and cross table lateral views, total 4 films. Five non rib bearing lumbar vertebra. There is no evidence of compression fracture. Minimal anterolisthesis of L4 and L5 likely on degenerative basis. Moderate to marked loss of disc height at L2-3, through L5-S1. Marked to severe facet arthropathy at all levels. Marked aortic atherosclerosis. Bilateral hip arthropathy. IMPRESSION: 1. No compression fracture. 2. Multilevel spondylosis with moderate to marked degenerative disease and facet arthropathy. Nm Lung Perfusion W Vent    Result Date: 9/11/2017  Ventilation-perfusion lung scan History:  Shortness of breath, acute in onset.  Comparison:  Portable chest earlier the same day Technique: Ventilation imaging was performed after inhalation of 0.8 millicuries of Tc 62K DTPA with a nebulizer followed by imaging in multiple projections. Perfusion imaging was performed after intravenous injection of 7.2 millicuries of Tc 90T MAA followed by imaging in multiple projections. Injection site: Right antecubital fossa vein Findings: Ventilation imaging shows no significant ventilation defects. Perfusion imaging shows mild inhomogeneity along the peripheral aspect of the lungs. No focal segmental perfusion defect is seen. No perfusion mismatches are present. Impression: Low probability for pulmonary embolism without definite focal segmental perfusion defect.

## 2017-09-18 NOTE — PROGRESS NOTES
Problem: Mobility Impaired (Adult and Pediatric)  Goal: *Acute Goals and Plan of Care (Insert Text)  Physical Therapy Goals LT/ST  Initiated 9/12/2017 and to be accomplished within 3-5 day(s)  1. Patient will move from supine <> sit with S in prep for out of bed activity and change of position. 2. Patient will perform sit<> stand with S with LRAD in prep for transfers/ambulation. 3. Patient will transfer from bed <> chair with S with LRAD for time up in chair for completion of ADL activity. 4. Patient will ambulate 150 feet with LRAD/S for improved functional mobility/safe discharge. Outcome: Not Progressing Towards Goal  PHYSICAL THERAPY TREATMENT     Patient: David Kowalski (84 y.o. female)  Date: 9/18/2017  Diagnosis: CHF (congestive heart failure) ([facility])  Hyperkalemia  Heart failure ([facility]) Diastolic congestive heart failure ([facility]       Precautions: Fall   Chart, physical therapy assessment, plan of care and goals were reviewed. ASSESSMENT:  Pt seen in bed w/ IV, supplemental O2, all bed rails up, and bed alarm on. Pt requires a lot of motivation to participate in PT treatment. Pt soiled bed and required TA for cleaning, pt appears to have bloody stool, nurse Noemi notified. Pt required Max A and additional time to sit on the EOB. Attempted to stand 3x w/ RW, but transfer activity failed secondary to pt demonstrating a posterior COG. Pt requesting to go back to bed during the entire treatment. With a lot of encouraging, pt was able to perform therex activity. Pt transferred back to bed, call bell and tray in reach, all bed rails up, bed alarm donned, nurse notified after session.    Progression toward goals:  [ ]      Improving appropriately and progressing toward goals  [X]      Improving slowly and progressing toward goals  [ ]      Not making progress toward goals and plan of care will be adjusted       PLAN:  Patient continues to benefit from skilled intervention to address the above impairments. Continue treatment per established plan of care. Discharge Recommendations:  [facility]  Further Equipment Recommendations for Discharge:  TBD by Rehab       SUBJECTIVE:   Patient stated I can't do it.       OBJECTIVE DATA SUMMARY:   Critical Behavior:  Neurologic State: Alert  Orientation Level: Oriented to place, Oriented to person  Cognition: Follows commands  Safety/Judgement: Decreased insight into deficits  Functional Mobility Training:  Bed Mobility:  Supine to Sit: Moderate assistance;Maximum assistance; Additional time  Sit to Supine: Moderate assistance;Maximum assistance; Additional time  Scooting: Maximum assistance; Additional time;Assist x2  Transfers:  Sit to Stand: Maximum assistance  Stand to Sit: Maximum assistance  Balance:  Sitting: Intact  Sitting - Static: Fair (occasional)  Sitting - Dynamic: Fair (occasional)  Standing: Impaired; With support  Standing - Static: Poor  Standing - Dynamic : None  Therapeutic Exercises:   Dynamic sitting balance activity  LAQ x 10  Ankle pumps x 10  Pain:  Pain Scale 1: Numeric (0 - 10)  Pain Intensity 1: 0  Pain Location 1: Abdomen  Pain Orientation 1: Anterior  Pain Intervention(s) 1: Rest  Activity Tolerance:   Poor   Please refer to the flowsheet for vital signs taken during this treatment.   After treatment:   [ ] Patient left in no apparent distress sitting up in chair  [X] Patient left in no apparent distress in bed  [X] Call bell left within reach  [X] Nursing notified  [ ] Caregiver present  [ ] Bed alarm activated      Thomas Muhammad PT   Time Calculation: 32 mins

## 2017-09-18 NOTE — PROGRESS NOTES
Consult received and appreciated. Palliative RN, Sadia Erwin and SW initiated consult. Met patient at bedside. No family was present. She was awake and alert reporting abdominal pain due to needing to have bowel movement. Patient presented anxious asking for us not to leave her alone until the RN came. SW assisted patient with relaxation techniques when dealing with intense abdominial pain. Called and spoke with daughter Olegario Kingsley at [phone]. She's a  at [employer], due to busy daily schedule is usually visits mother in evening. We planned for early family meeting for tomorrow at 7:45a. She stated mother has completed document naming her MPOA. She gave  permission to contact [place] for copy. Palliative Team confirmed with both daughter and patient wish regarding code status. They confirmed scanned Durable DNR on file does reflect patients wish for DNR. Contacted [place] and they will be faxing the advanced directive on file with them. Thank you for the opportunity to assist in the care of Ms. Nicole Diaz.    Dimas Redman, MSW  Palliative Medicine

## 2017-09-18 NOTE — ROUTINE PROCESS
Bedside and Verbal shift change report given to PATRICIA Powers (oncoming nurse) by Celeste (offgoing nurse). Report included the following information SBAR, Kardex, Intake/Output, MAR and Recent Results.

## 2017-09-18 NOTE — PROGRESS NOTES
09/18/17 1045   Urinary Catheter 09/18/17 2- way   Placement Date/Time: 09/18/17 1030   Type: 2- way  Catheter Size: 16 FR  Insertion Procedure Practices: Insertion date placed on tilley drainage bag;Order written;Pre-connected closed drainage system;Properly inflated balloon;Securement device used;Ruperto. .. Urine Output (mL) 750 ml     Tilley catheter inserted per Nephrology order, urine sample obtained and sent to lab. Will continue to monitor.

## 2017-09-18 NOTE — PROGRESS NOTES
1500 assumed care of patient from [facility] 108 Assessment completed, patient is alert and oriented to self only, but does follow simple commands. NSR on the monitor with a HR in the 70's. Lung fields are clear throughout on 2L NC with a nonproductive cough. Rocha to gravity draining clear yellow urine. Patient refused scheduled TUMS and is currently resting quietly in bed, no s/s of any distress, will continue to monitor. 2121 Scheduled medications administered as ordered without any complications. Patient was repositioned in bed and given a cup of broth, no s/s of any pain or distress, will continue to monitor    2233 Scheduled medications administered as ordered without any complications. Patient is currently resting quietly in bed, no s/s of any pain or distress, will continue to monitor. 2358 Bedside and Verbal shift change report given to Chago Fofana rn (oncoming nurse) by Jeff Caro RN (offgoing nurse). Report included the following information SBAR.

## 2017-09-19 PROBLEM — R62.7 FAILURE TO THRIVE IN ADULT: Status: ACTIVE | Noted: 2017-09-19

## 2017-09-19 LAB
ALBUMIN SERPL-MCNC: 2.9 G/DL (ref 3.4–5)
ANION GAP SERPL CALC-SCNC: 10 MMOL/L (ref 3–18)
BUN SERPL-MCNC: 153 MG/DL (ref 7–18)
BUN/CREAT SERPL: 32 (ref 12–20)
CALCIUM SERPL-MCNC: 8.7 MG/DL (ref 8.5–10.1)
CHLORIDE SERPL-SCNC: 96 MMOL/L (ref 100–108)
CO2 SERPL-SCNC: 31 MMOL/L (ref 21–32)
CREAT SERPL-MCNC: 4.79 MG/DL (ref 0.6–1.3)
GLUCOSE BLD STRIP.AUTO-MCNC: 140 MG/DL (ref 70–110)
GLUCOSE BLD STRIP.AUTO-MCNC: 176 MG/DL (ref 70–110)
GLUCOSE BLD STRIP.AUTO-MCNC: 178 MG/DL (ref 70–110)
GLUCOSE BLD STRIP.AUTO-MCNC: 204 MG/DL (ref 70–110)
GLUCOSE SERPL-MCNC: 176 MG/DL (ref 74–99)
MAGNESIUM SERPL-MCNC: 3 MG/DL (ref 1.6–2.6)
PHOSPHATE SERPL-MCNC: 5.4 MG/DL (ref 2.5–4.9)
POTASSIUM SERPL-SCNC: 3.5 MMOL/L (ref 3.5–5.5)
SODIUM SERPL-SCNC: 137 MMOL/L (ref 136–145)

## 2017-09-19 PROCEDURE — 36415 COLL VENOUS BLD VENIPUNCTURE: CPT | Performed by: INTERNAL MEDICINE

## 2017-09-19 PROCEDURE — 83735 ASSAY OF MAGNESIUM: CPT | Performed by: INTERNAL MEDICINE

## 2017-09-19 PROCEDURE — 80069 RENAL FUNCTION PANEL: CPT | Performed by: INTERNAL MEDICINE

## 2017-09-19 PROCEDURE — 82962 GLUCOSE BLOOD TEST: CPT

## 2017-09-19 PROCEDURE — 74011250637 HC RX REV CODE- 250/637: Performed by: FAMILY MEDICINE

## 2017-09-19 PROCEDURE — 74011636637 HC RX REV CODE- 636/637: Performed by: FAMILY MEDICINE

## 2017-09-19 PROCEDURE — 74011250636 HC RX REV CODE- 250/636: Performed by: INTERNAL MEDICINE

## 2017-09-19 PROCEDURE — 65660000000 HC RM CCU STEPDOWN

## 2017-09-19 PROCEDURE — 74011636637 HC RX REV CODE- 636/637: Performed by: HOSPITALIST

## 2017-09-19 PROCEDURE — 74011250636 HC RX REV CODE- 250/636: Performed by: FAMILY MEDICINE

## 2017-09-19 PROCEDURE — 74011250637 HC RX REV CODE- 250/637: Performed by: INTERNAL MEDICINE

## 2017-09-19 RX ORDER — ACETAMINOPHEN 325 MG/1
650 TABLET ORAL 4 TIMES DAILY
Status: DISCONTINUED | OUTPATIENT
Start: 2017-09-19 | End: 2017-09-20 | Stop reason: HOSPADM

## 2017-09-19 RX ORDER — INSULIN GLARGINE 100 [IU]/ML
25 INJECTION, SOLUTION SUBCUTANEOUS DAILY
Status: DISCONTINUED | OUTPATIENT
Start: 2017-09-19 | End: 2017-09-20 | Stop reason: HOSPADM

## 2017-09-19 RX ORDER — CLONAZEPAM 0.5 MG/1
0.5 TABLET ORAL
Status: DISCONTINUED | OUTPATIENT
Start: 2017-09-19 | End: 2017-09-19

## 2017-09-19 RX ORDER — HALOPERIDOL 5 MG/ML
2 INJECTION INTRAMUSCULAR
Status: DISCONTINUED | OUTPATIENT
Start: 2017-09-19 | End: 2017-09-20 | Stop reason: HOSPADM

## 2017-09-19 RX ORDER — CLONAZEPAM 0.5 MG/1
0.5 TABLET ORAL 2 TIMES DAILY
Status: DISCONTINUED | OUTPATIENT
Start: 2017-09-19 | End: 2017-09-20 | Stop reason: HOSPADM

## 2017-09-19 RX ORDER — FACIAL-BODY WIPES
10 EACH TOPICAL DAILY PRN
Status: DISCONTINUED | OUTPATIENT
Start: 2017-09-19 | End: 2017-09-20 | Stop reason: HOSPADM

## 2017-09-19 RX ORDER — AMOXICILLIN 250 MG
2 CAPSULE ORAL
Status: DISCONTINUED | OUTPATIENT
Start: 2017-09-19 | End: 2017-09-20 | Stop reason: HOSPADM

## 2017-09-19 RX ORDER — AMLODIPINE BESYLATE 5 MG/1
5 TABLET ORAL DAILY
Status: DISCONTINUED | OUTPATIENT
Start: 2017-09-20 | End: 2017-09-20 | Stop reason: HOSPADM

## 2017-09-19 RX ORDER — INSULIN LISPRO 100 [IU]/ML
4 INJECTION, SOLUTION INTRAVENOUS; SUBCUTANEOUS
Status: DISCONTINUED | OUTPATIENT
Start: 2017-09-19 | End: 2017-09-20 | Stop reason: HOSPADM

## 2017-09-19 RX ORDER — OXYCODONE HCL 20 MG/ML
10 CONCENTRATE, ORAL ORAL
Status: DISCONTINUED | OUTPATIENT
Start: 2017-09-19 | End: 2017-09-20 | Stop reason: HOSPADM

## 2017-09-19 RX ORDER — SODIUM CHLORIDE 9 MG/ML
75 INJECTION, SOLUTION INTRAVENOUS CONTINUOUS
Status: DISCONTINUED | OUTPATIENT
Start: 2017-09-19 | End: 2017-09-19

## 2017-09-19 RX ORDER — HALOPERIDOL 2 MG/ML
2 SOLUTION ORAL
Status: DISCONTINUED | OUTPATIENT
Start: 2017-09-19 | End: 2017-09-20 | Stop reason: HOSPADM

## 2017-09-19 RX ORDER — OMEPRAZOLE 20 MG/1
20 CAPSULE, DELAYED RELEASE ORAL DAILY
Status: DISCONTINUED | OUTPATIENT
Start: 2017-09-19 | End: 2017-09-20 | Stop reason: HOSPADM

## 2017-09-19 RX ADMIN — MORPHINE SULFATE 1 MG: 2 INJECTION, SOLUTION INTRAMUSCULAR; INTRAVENOUS at 01:52

## 2017-09-19 RX ADMIN — OXYCODONE HYDROCHLORIDE 10 MG: 100 SOLUTION ORAL at 11:53

## 2017-09-19 RX ADMIN — INSULIN LISPRO 4 UNITS: 100 INJECTION, SOLUTION INTRAVENOUS; SUBCUTANEOUS at 11:53

## 2017-09-19 RX ADMIN — INSULIN LISPRO 4 UNITS: 100 INJECTION, SOLUTION INTRAVENOUS; SUBCUTANEOUS at 18:21

## 2017-09-19 RX ADMIN — HEPARIN SODIUM 5000 UNITS: 5000 INJECTION, SOLUTION INTRAVENOUS; SUBCUTANEOUS at 04:46

## 2017-09-19 RX ADMIN — MORPHINE SULFATE 1 MG: 2 INJECTION, SOLUTION INTRAMUSCULAR; INTRAVENOUS at 05:48

## 2017-09-19 RX ADMIN — ONDANSETRON 4 MG: 2 INJECTION INTRAMUSCULAR; INTRAVENOUS at 01:52

## 2017-09-19 RX ADMIN — LEVOTHYROXINE SODIUM 150 MCG: 150 TABLET ORAL at 06:46

## 2017-09-19 RX ADMIN — INSULIN LISPRO 2 UNITS: 100 INJECTION, SOLUTION INTRAVENOUS; SUBCUTANEOUS at 06:46

## 2017-09-19 RX ADMIN — INSULIN GLARGINE 25 UNITS: 100 INJECTION, SOLUTION SUBCUTANEOUS at 09:52

## 2017-09-19 NOTE — PROGRESS NOTES
Hospitalist Progress Note-critical care note     Patient: Soumya Wright MRN: [id number]  CSN: [id number]    YOB: 1932  Age: 80 y.o.   Sex: female    DOA: 9/10/2017 LOS:  LOS: 9 days            Chief complaint: chf exacerbation, pam on ckd, carcinoma, acute encephalopathy   Stridor, anxiety   Assessment/Plan         Hospital Problems  Date Reviewed: 9/11/2017          Codes Class Noted POA    Lower obstructive uropathy ICD-10-CM: N13.9  ICD-9-CM: 599.60  9/18/2017 Unknown        Urinary retention ICD-10-CM: R33.9  ICD-9-CM: 788.20  9/18/2017 Unknown        Acute hyperactive delirium due to multiple etiologies ICD-10-CM: F05  ICD-9-CM: 293.0  9/15/2017 Unknown        Dysphagia ICD-10-CM: R13.10  ICD-9-CM: 787.20  9/13/2017 Unknown        Stridor ICD-10-CM: R06.1  ICD-9-CM: 786.1  9/11/2017 Unknown        Localized cancer of lip, oral cavity and throat ([facility] ICD-10-CM: C14.8  ICD-9-CM: 149.8  9/11/2017 Yes    Overview Signed 9/11/2017  1:48 AM by Matilda Vickers MD     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) ICD-10-CM: N18.4  ICD-9-CM: 585.4  3/12/2017 Yes        PAM (acute kidney injury) ([facility] ICD-10-CM: N17.9  ICD-9-CM: 584.9  7/17/2016 Yes        DM (diabetes mellitus) ([facility] ICD-10-CM: E11.9  ICD-9-CM: 250.00  7/17/2016 Yes        * (Principal)Diastolic congestive heart failure ([facility] ICD-10-CM: I50.30  ICD-9-CM: 428.30, 428.0  7/16/2016 Unknown        Shortness of breath ICD-10-CM: R06.02  ICD-9-CM: 786.05 5/3/2015 Yes            1 acute encephalopathy   Resolved    Ct head no acute issue. 2. CHF (congestive heart failure) (HCC)   Diuretics hold due to worsening renal function   Ef wnl, diastolic dysfunction        3 PAM (acute kidney injury) on ckd4   Cr worsening   Rocha in ,  Case discussed with Dr. Юлия Davis       4   DM (diabetes mellitus) ([facility] (7/17/2016)    Continue  lantus and premeal insulin. ssi       4  Hyperkalemia (9/10/2017)   resolved. 5. stridor   No airway problem    was on  steroid and abx   Speech  On board   6. 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 - will hold radiation per oncology       6. Anxiety (9/11/2017)  Continue ativan prn   7 Dysphagia ; dysohagia diet ,   8 malnutrition   Decreased appetite    Refused to eat, iv d5 low rate   9 constipation  kub no obstruction   10 leukocytosis   Worsening   Will continue monitor cbc , repeat today   11 urinary retention  Rocha in   12  Failure to thrive   Do not want to have feeding tube,     Subjective: I make decision   Nurse : no acute issue     Family meeting hold per palliative care. Daughter was at the bedside. discussion w pt and daughter regarding her current clinical condition, and discussed options of care including continuing aggressive care including further imaging, blood gasses, antibiotic therapy, cardiac monitoring etc,  versus comfort measures with goal of treating symptoms and allowing a natural death. At this time they wish to proceed withcomfort measures. They do not want HD and radiation therapy and want to go with nursing home with hospice. Review of systems:    General: No fevers or chills. Cardiovascular: No chest pain or pressure. No palpitations. Pulmonary:  shortness of breath is better   Gastrointestinal: No nausea, vomiting.      Vital signs/Intake and Output:  Visit Vitals    /45 (BP 1 Location: Right arm, BP Patient Position: At rest;Supine; Head of bed elevated (Comment degrees))    Pulse 69    Temp 98.8 °F (37.1 °C)    Resp 18    Ht 5' (1.524 m)    Wt 79.3 kg (174 lb 13.2 oz)    SpO2 99%    BMI 34.14 kg/m2     Current Shift:     Last three shifts:  09/17 1901 - 09/19 0700  In: 7218 [P.O.:900; I.V.:642]  Out: 1000 [Urine:1000]    Physical Exam:  General: WD, WN. Alert, cooperative, no acute distress    HEENT: NC, swelling and enlarged  of chin   PERRLA, anicteric sclerae. Lungs: CTA Bilaterally. No Wheezing/Rhonchi/Rales. Heart:  Regular  rhythm,  No murmur, No Rubs, No Gallops  Abdomen: Soft, Non distended, Non tender.  +Bowel sounds,   Extremities: No c/c/e  Psych:   No anxious , no agitated. Neurologic:  No acute neurological deficit. Labs: Results:       Chemistry Recent Labs      09/19/17   0453  09/18/17   0359  09/17/17   1505  09/17/17   0303   GLU  176*  203*  198*  187*   NA  137  140  140  143   K  3.5  3.4*  3.3*  2.8*   CL  96*  98*  98*  97*   CO2  31  32  36*  37*   BUN  153*  128*  105*  95*   CREA  4.79*  3.49*  2.58*  2.36*   CA  8.7  9.7  9.8  10.3*   AGAP  10  10  6  9   BUCR  32*  37*  41*  40*   ALB  2.9*  3.2*   --   3.6      CBC w/Diff Recent Labs      09/18/17   1510   WBC  37.3*   RBC  5.40*   HGB  13.1   HCT  39.9   PLT  496*   GRANS  88*   LYMPH  8*   EOS  0      Cardiac Enzymes No results for input(s): CPK, CKND1, DORITA in the last 72 hours. No lab exists for component: CKRMB, TROIP   Coagulation No results for input(s): PTP, INR, APTT in the last 72 hours. No lab exists for component: INREXT, INREXT    Lipid Panel Lab Results   Component Value Date/Time    Cholesterol, total 124 07/17/2016 02:21 AM    HDL Cholesterol 41 07/17/2016 02:21 AM    LDL, calculated 57.6 07/17/2016 02:21 AM    VLDL, calculated 25.4 07/17/2016 02:21 AM    Triglyceride 127 07/17/2016 02:21 AM    CHOL/HDL Ratio 3.0 07/17/2016 02:21 AM      BNP No results for input(s): BNPP in the last 72 hours. Liver Enzymes Recent Labs      09/19/17   0453   ALB  2.9*      Thyroid Studies Lab Results   Component Value Date/Time    TSH 1.92 03/11/2017 04:41 AM        Procedures/imaging: see electronic medical records for all procedures/Xrays and details which were not copied into this note but were reviewed prior to creation of Leonardo Yadav MD

## 2017-09-19 NOTE — PROGRESS NOTES
2427 shift summary: pt is on bed rest , DNR, A/O x2 with periods of confusion. On dysphagia diet with pureed consistency and thin liquids without straw, meds with applesauce. Mepilex to sacrum, excoriation to radha/vaginal area - proshield applied to area, incontinent of bowel once. Rocha cath for retention/obstruction, clear santino. On IVF via a 24 g to RFA. Morphine given ordered prn - pt requested IV medication, BS ACHS. DNR. Bedside and Verbal shift change report given to Mayda Lyn RN (oncoming nurse) by Mynor Hernandez RN   (offgoing nurse). Report included the following information SBAR, Kardex, Intake/Output, MAR and Recent Results.

## 2017-09-19 NOTE — PROGRESS NOTES
D/c plan: anticipate patient to go to [id number] [facility] 09/20/2017      Patient and daughter have changed their minds per daughter. They would like to return to [facility]. Telephone call with [facility] of [address]. They will accept patient back with Hospice. Telephone call with daughter Dionne Fortune she states she would like to continue with [facility]. States patient will need hospital bed, tray and wheel chair. Telephone call with [facility] informed her patient has decided to go back to [place] and has confirmed they will accept her with hospice tomorrow. Sang Borja will order DME supplies for patient. Telephone call with Daughter Tala Whyte that plan for tomorrow will be for 11am to have trasnported to [facility] [address], [place], [address] [place]   Phone: [phone]       Rn please call report to hospice nurse Sang Borja [phone] and to [facility] @  [phone]    RN please send patient to [facility] @  [address], [place], [address]      Please include all hard scripts for controlled substances, med rec and dc summary in packet. Please medicate for pain prior to dc if possible and needed to help offset delay when patient first arrives to facility. Care Management Interventions  PCP Verified by CM:  Yes  Mode of Transport at Discharge: BLS  Transition of Care Consult (CM Consult): SNF  Partner SNF: Yes  Physical Therapy Consult: Yes  Occupational Therapy Consult: Yes  Current Support Network: [address] [place] [address] [address]  Confirm Follow Up Transport: Family  Plan discussed with Pt/Family/Caregiver: Yes  Freedom of Choice Offered: Yes  Discharge Location  Discharge Placement: Skilled nursing facility

## 2017-09-19 NOTE — PROGRESS NOTES
PT has chosen to be comfort measures only and transitioning to hospice. Will sign off, please contact me with any questions.

## 2017-09-19 NOTE — ROUTINE PROCESS
Bedside and Verbal shift change report given to CHARITY Peters (oncoming nurse) by Celeste (offgoing nurse). Report included the following information SBAR, Kardex, Intake/Output, MAR and Recent Results.      Patient had a uneventful shift

## 2017-09-19 NOTE — PROGRESS NOTES
Aware of plan for comfort care which is very appropriate. Discussed the case with Dr. Fang Roach.  We will sign off. Call us as needed. Belia Barriga.  Sujata Tellez MD, PhD, [facility]  [phone]

## 2017-09-19 NOTE — PROGRESS NOTES
D/c plan not finalized    Dr. Marlo Metz informed CM that patients daughter would like for her to go to hospice. Telephone call with patients daughter Rae Brown. She informed CM she does want to have patient in hospice. States she has already  Paid for [facility] for [facility] months but patient does not want to return there. Daughter provided with [address] for placement for Hospice. Daughter states she cannot take patient home. Daughter states she would like to try [address] [facility]. Telephone call with [address] [facility] he will call daughter provided him with the contact information as requested by daughter. He will make contact with her and call CM if able to accomodate. Daughter would like to use [facility] for hospice.  CMs will place referral.

## 2017-09-19 NOTE — PROGRESS NOTES
Patient tolerating current diet of pureed solids with thin liquids with utilization of safe swallow strategies to reduce risk of aspiration per MBSS results. Patient wishes for comfort care, therefore SLP signing off. Will resolve POC.     Thank you for this referral,   Zafar Singh M.S.Ed., CCC/SLP  Speech Language Pathologist

## 2017-09-19 NOTE — CONSULTS
[facility]: [id number])  [facility]: [address] [place] [address] Way: [phone]    Patient Name: Lan Anand  YOB: 1932    Date of Initial Consult: 9/19/17  Reason for Consult: care decisions  Requesting Provider: Dr. Dede Galvan   Primary Care Physician: Rayshawn Hanley MD      SUMMARY:   Lan Anand is a 80y.o. year old with a past history of anxiety, depression, diastolic CHF, CAD, CRI admitted with SOB. 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 Chema Gomez, last on 9/8. This was interrupted by two admissions to [facility] for CP, SOB and hypertensive urgency in August. Current admission is fourth since March. She is being treated for pseudomonas UTI, has obstructive uropathy and progressively rising Cr, and has been unable to eat or drink in any significant quantity. Enteral feedings have been recommended. She has sharp perineal pain, bladder spasm and states she \"hurts all over\". She has been a resident of [place] for about three years. She has two children she is estranged from, has remained close to her adopted daughter.      PALLIATIVE DIAGNOSES:     Patient Active Problem List   Diagnosis Code    Shortness of breath R06.02    Angina pectoris (HCC) I20.9    CAD (coronary artery disease) I25.10    HTN (hypertension) I16    Diastolic congestive heart failure (HCC) I50.30    PAM (acute kidney injury) ([facility] N17.9    DM (diabetes mellitus) ([facility]) E11.9    Leukocytosis D72.829    Hyponatremia E87.1    CKD (chronic kidney disease) stage 4, GFR 15-29 ml/min (HCC) N18.4    Hyperkalemia E87.5    Stridor R06.1    Localized cancer of lip, oral cavity and throat (HCC) C14.8    Anxiety F41.9    Heart failure (HCC) I50.9    Acute renal failure superimposed on stage 4 chronic kidney disease (HCC) N17.9, N18.4    Dysphagia R13.10    Acute hyperactive delirium due to multiple etiologies F05    Lower obstructive uropathy N13.9    Acute myocardial infarction (HCC) I21.3    Pulmonary nodule R91.1    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 PLAN:   1. Palliative team met with patient, her adopted daughter and BELEN. Reviewed her clinical course over the last year and the difficulties she has experienced since initiating treatment for her cancer. Answered questions regarding her progressive weakness, anorexia, nausea and pain and how these symptoms relate to her underlying malignancy, it's treatment, and her baseline functional status. Explained that if she felt like trying to complete her radiation, part of that care plan would require enteral feedings, continued aggressive hospital care which she has almost systematically refused, and the potential for dialysis should her renal failure continue to progress. In view of her course over the last several months, even with the most aggressive support, she is unlikely to regain enough function to complete her treatments and/or stay out of hospital. She says she knows she hasn't gotten any better since she's been in hospital and that \"everything you are doing to me makes me feel worse\". She is adamant that she does not want a feeding tube and would not consider dialysis. Described elements of a comfort care plan supervised by hospice which we recommend if she doesn't choose to attempt further palliative rxt for her cancer. She has decided she doesn't want further aggressive care and is supported in this decision by her family. Hospice consult placed. She plans to return to [place]. Oxycodone, clonopin and bowel regimen ordered. AMD designating daughter executed. Initial consult note routed to primary continuity provider  2.  Communicated plan of care with: Palliative IDT       GOALS OF CARE:     comfort    Advance Care Planning 9/19/2017   Patient's Healthcare Decision Maker is: Named in scanned ACP document   Primary Decision Maker Name Paul Prescott   Primary Decision Maker Phone Number [phone]   Primary Decision Maker Relationship to Patient Adult child   Secondary Decision Maker Name Telma Fried   Secondary Decision Maker Phone Number [phone]   Secondary Decision Maker Relationship to Patient Other relative   Confirm Advance Directive Yes, on file   Does the patient have other document types Do Not Resuscitate           HISTORY:     History obtained from: patient and family  CHIEF COMPLAINT: see summary    HPI/SUBJECTIVE:    The patient is:   [x] Verbal and participatory  [] Non-participatory due to:        Clinical Pain Assessment (nonverbal scale for nonverbal patients): Pain: 8         FUNCTIONAL ASSESSMENT:     Palliative Performance Scale (PPS):  PPS: 40       PSYCHOSOCIAL/SPIRITUAL SCREENING:     Advance Care Planning:  Advance Care Planning 9/19/2017   Patient's Healthcare Decision Maker is: Named in scanned ACP document   Primary Decision Maker Name Paul Prescott   Primary Decision Maker Phone Number [phone]   Primary Decision Maker Relationship to Patient Adult child   Secondary Decision Maker Name Javier Davies Phone Number [phone]   Secondary Decision Maker Relationship to Patient Other relative   Confirm Advance Directive Yes, on file   Does the patient have other document types Do Not Resuscitate        Any spiritual / Orthodoxy concerns:  [] Yes /  [x] No    Caregiver Burnout:  [] Yes /  [x] No /  [] No Caregiver Present      Anticipatory grief assessment:   [x] Normal  / [] Maladaptive       ESAS Anxiety: Anxiety: 9    ESAS Depression: Depression: 8        REVIEW OF SYSTEMS:     Positive and pertinent negative findings in ROS are noted above in HPI. The following systems were [x] reviewed / [] unable to be reviewed as noted in HPI  Other findings are noted below. Systems: constitutional, ears/nose/mouth/throat, respiratory, gastrointestinal, genitourinary, musculoskeletal, integumentary, neurologic, psychiatric, endocrine. Positive findings noted below. Modified ESAS Completed by: provider   Fatigue: 8 Drowsiness: 5   Depression: 8 Pain: 8   Anxiety: 9 Nausea: 5   Anorexia: 8 Dyspnea: 5     Constipation: Yes     Stool Occurrence(s): 1     ECOG: 3     PHYSICAL EXAM:     Wt Readings from Last 3 Encounters:   09/19/17 79.3 kg (174 lb 13.2 oz)   09/03/17 91.8 kg (202 lb 6.4 oz)   07/31/17 82.6 kg (182 lb)     Blood pressure 118/45, pulse 69, temperature 98.8 °F (37.1 °C), resp. rate 18, height 5' (1.524 m), weight 79.3 kg (174 lb 13.2 oz), SpO2 99 %. Pain:  Pain Scale 1: Visual  Pain Intensity 1: 2     Pain Location 1: Rectal  Pain Orientation 1: Posterior  Pain Description 1: Sharp  Pain Intervention(s) 1: Medication (see MAR)  Last bowel movement:     Constitutional: anxious, tearful at times  Eyes: pupils equal, anicteric  ENMT: no nasal discharge, moist mucous membranes  Cardiovascular: regular rhythm, distal pulses intact  Respiratory: breathing not labored, symmetric  Gastrointestinal: soft non-tender, +bowel sounds  Musculoskeletal: no deformity, no tenderness to palpation  Skin: warm, dry  Neurologic: following commands, moving all extremities  Psychiatric: full affect, no hallucinations.  Anxious, but oriented x 3.  Other:       HISTORY:     Principal Problem:    Diastolic congestive heart failure ([facility] (7/16/2016)    Active Problems:    Shortness of breath (5/3/2015)      PAM (acute kidney injury) ([facility] (7/17/2016)      DM (diabetes mellitus) ([facility] (7/17/2016)      CKD (chronic kidney disease) stage 4, GFR 15-29 ml/min ([facility]) (3/12/2017)      Hyperkalemia (9/10/2017)      Stridor (9/11/2017)      Localized cancer of lip, oral cavity and throat ([facility] (9/11/2017)      Overview: 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       therapy 9/2017      Anxiety (9/11/2017)      Heart failure ([address] (9/11/2017)      Acute renal failure superimposed on stage 4 chronic kidney disease ([address] (9/11/2017)      Dysphagia (9/13/2017)      Acute hyperactive delirium due to multiple etiologies (9/15/2017)      Lower obstructive uropathy (9/18/2017)      Urinary retention (9/18/2017)      Failure to thrive in adult (9/19/2017)      Past Medical History:   Diagnosis Date    Anxiety     Arthritis     CAD (coronary artery disease)     Minor    Chest pain, unspecified 1/28/2011    Diabetes ([facility] 1964    Adult onset for 39 years    Essential hypertension     Hiatal hernia     Hyperlipidemia     Hypothyroidism     Lower obstructive uropathy 8/04/6093    Systolic hypertension       Past Surgical History:   Procedure Laterality Date    CARDIAC SURG PROCEDURE UNLIST      three cardiac stents     HX CAROTID ENDARTERECTOMY      HX CHOLECYSTECTOMY      HX HYSTERECTOMY      NM RADIONUCLIDE ABLATION THERAPY        History reviewed. No pertinent family history. History reviewed, no pertinent family history.   Social History   Substance Use Topics    Smoking status: Former Smoker     Packs/day: 1.00    Smokeless tobacco: Never Used    Alcohol use No     No Known Allergies   Current Facility-Administered Medications   Medication Dose Route Frequency    insulin glargine (LANTUS) injection 25 Units  25 Units SubCUTAneous DAILY    insulin lispro (HUMALOG) injection 4 Units  4 Units SubCUTAneous TIDAC    acetaminophen (TYLENOL) tablet 650 mg  650 mg Oral QID    omeprazole (PRILOSEC) capsule 20 mg  20 mg Oral DAILY    [START ON 9/20/2017] amLODIPine (NORVASC) tablet 5 mg  5 mg Oral DAILY    haloperidol (HALDOL) 2 mg/mL oral solution 2 mg  2 mg SubLINGual Q6H PRN    Or    haloperidol lactate (HALDOL) injection 2 mg  2 mg IntraVENous Q6H PRN    senna-docusate (PERICOLACE) 8.6-50 mg per tablet 2 Tab  2 Tab Oral QHS    bisacodyl (DULCOLAX) suppository 10 mg  10 mg Rectal DAILY PRN    Sodium Phosphates (FLEET'S) enema 66 mL  1 Enema Rectal DAILY PRN    oxyCODONE (ROXICODONE INTENSOL) 20 mg/mL concentrated solution 10 mg  10 mg Oral Q1H PRN    clonazePAM (KlonoPIN) tablet 0.5 mg  0.5 mg Oral BID    metoprolol tartrate (LOPRESSOR) tablet 25 mg  25 mg Oral Q12H    pyridoxine (vitamin B6) (VITAMIN B-6) tablet 25 mg  25 mg Oral DAILY    albuterol-ipratropium (DUO-NEB) 2.5 MG-0.5 MG/3 ML  3 mL Nebulization Q4H PRN    mirtazapine (REMERON SOL-TAB) disintegrating tablet 30 mg  30 mg Oral QHS    aspirin delayed-release tablet 81 mg  81 mg Oral DAILY    cloNIDine HCl (CATAPRES) tablet 0.2 mg  0.2 mg Oral BID    clopidogrel (PLAVIX) tablet 75 mg  75 mg Oral DAILY    clotrimazole-betamethasone (LOTRISONE) 1-0.05 % cream   Topical BID    isosorbide mononitrate ER (IMDUR) tablet 30 mg  30 mg Oral BID    levothyroxine (SYNTHROID) tablet 150 mcg  150 mcg Oral ACB    nitroglycerin (NITROSTAT) tablet 0.4 mg  0.4 mg SubLINGual PRN    polyethylene glycol (MIRALAX) packet 17 g  17 g Oral PRN    promethazine (PHENERGAN) 6.25 mg/5 mL syrup 6.25 mg  6.25 mg Oral QID PRN        LAB AND IMAGING FINDINGS:     Lab Results   Component Value Date/Time    WBC 37.3 09/18/2017 03:10 PM    HGB 13.1 09/18/2017 03:10 PM    PLATELET 001 08/45/3636 03:10 PM     Lab Results   Component Value Date/Time    Sodium 137 09/19/2017 04:53 AM    Potassium 3.5 09/19/2017 04:53 AM    Chloride 96 09/19/2017 04:53 AM    CO2 31 09/19/2017 04:53 AM     09/19/2017 04:53 AM    Creatinine 4.79 09/19/2017 04:53 AM    Calcium 8.7 09/19/2017 04:53 AM    Magnesium 3.0 09/19/2017 04:53 AM    Phosphorus 5.4 09/19/2017 04:53 AM Lab Results   Component Value Date/Time    AST (SGOT) 11 09/12/2017 05:46 AM    Alk.  phosphatase 85 09/12/2017 05:46 AM    Protein, total 7.2 09/12/2017 05:46 AM    Albumin 2.9 09/19/2017 04:53 AM    Globulin 4.2 09/12/2017 05:46 AM     Lab Results   Component Value Date/Time    INR 0.9 03/12/2017 05:15 AM    Prothrombin time 11.8 03/12/2017 05:15 AM    aPTT 29.2 03/12/2017 05:15 AM      No results found for: IRON, FE, TIBC, IBCT, PSAT, FERR   No results found for: PH, PCO2, PO2  No components found for: [place]   Lab Results   Component Value Date/Time    CK 64 09/11/2017 05:30 PM    CK - MB 1.5 09/11/2017 05:30 PM              Total time: 70 min  Counseling / coordination time, spent as noted above:   > 50% counseling / coordination      Mari Mcmanus MD  Palliative Medicine

## 2017-09-19 NOTE — ACP (ADVANCE CARE PLANNING)
Patient completed new Advance Directive today. Daughter, Constantino Garcia is primary MPOA [phone]. Son in law, Jermaine Sui is secondary MPOA [phone]. DDNR in EMR. Palliative Medicine team confirmed that this is still her wish. She would like comfort care with Hospice assistance for symptom management. Patient's decision discussed with Dr. Shyanne Siddiqi, Dr. Francis Pearson, Keke Lay RN, and [facility].     Sameer Billingsley RN

## 2017-09-20 VITALS
HEIGHT: 60 IN | TEMPERATURE: 98.4 F | DIASTOLIC BLOOD PRESSURE: 65 MMHG | HEART RATE: 89 BPM | RESPIRATION RATE: 18 BRPM | SYSTOLIC BLOOD PRESSURE: 188 MMHG | WEIGHT: 167.5 LBS | BODY MASS INDEX: 32.89 KG/M2 | OXYGEN SATURATION: 98 %

## 2017-09-20 LAB — GLUCOSE BLD STRIP.AUTO-MCNC: 124 MG/DL (ref 70–110)

## 2017-09-20 PROCEDURE — 74011250636 HC RX REV CODE- 250/636: Performed by: FAMILY MEDICINE

## 2017-09-20 PROCEDURE — 82962 GLUCOSE BLOOD TEST: CPT

## 2017-09-20 PROCEDURE — 74011250637 HC RX REV CODE- 250/637: Performed by: FAMILY MEDICINE

## 2017-09-20 PROCEDURE — 74011636637 HC RX REV CODE- 636/637: Performed by: FAMILY MEDICINE

## 2017-09-20 RX ORDER — HALOPERIDOL 2 MG/ML
2 SOLUTION ORAL
Qty: 30 ML | Refills: 0 | Status: SHIPPED | OUTPATIENT
Start: 2017-09-20

## 2017-09-20 RX ORDER — PROCHLORPERAZINE MALEATE 10 MG
5 TABLET ORAL
Status: DISCONTINUED | OUTPATIENT
Start: 2017-09-20 | End: 2017-09-20 | Stop reason: HOSPADM

## 2017-09-20 RX ORDER — ONDANSETRON 4 MG/1
8 TABLET, ORALLY DISINTEGRATING ORAL
Status: DISCONTINUED | OUTPATIENT
Start: 2017-09-20 | End: 2017-09-20 | Stop reason: HOSPADM

## 2017-09-20 RX ORDER — OXYCODONE HCL 20 MG/ML
10 CONCENTRATE, ORAL ORAL
Qty: 30 ML | Refills: 0 | Status: SHIPPED | OUTPATIENT
Start: 2017-09-20

## 2017-09-20 RX ORDER — FACIAL-BODY WIPES
10 EACH TOPICAL DAILY
Qty: 10 EACH | Refills: 0 | Status: SHIPPED | OUTPATIENT
Start: 2017-09-20

## 2017-09-20 RX ORDER — INSULIN GLARGINE 100 [IU]/ML
25 INJECTION, SOLUTION SUBCUTANEOUS DAILY
Qty: 1 VIAL | Refills: 0 | Status: SHIPPED
Start: 2017-09-20

## 2017-09-20 RX ORDER — ONDANSETRON 4 MG/1
4 TABLET, ORALLY DISINTEGRATING ORAL
Qty: 20 TAB | Refills: 0 | Status: SHIPPED | OUTPATIENT
Start: 2017-09-20

## 2017-09-20 RX ADMIN — INSULIN GLARGINE 25 UNITS: 100 INJECTION, SOLUTION SUBCUTANEOUS at 09:16

## 2017-09-20 RX ADMIN — ONDANSETRON 8 MG: 4 TABLET, ORALLY DISINTEGRATING ORAL at 10:36

## 2017-09-20 RX ADMIN — HALOPERIDOL LACTATE 2 MG: 5 INJECTION, SOLUTION INTRAMUSCULAR at 06:16

## 2017-09-20 NOTE — DISCHARGE SUMMARY
Discharge Summary    Patient: Nya Hughes MRN: [id number]  CSN: [id number]    YOB: 1932  Age: 80 y.o.   Sex: female    DOA: 9/10/2017 LOS:  LOS: 10 days   Discharge Date:      Primary Care Provider:  Catarino Corbin MD    Admission Diagnoses: CHF (congestive heart failure) ([facility]  Hyperkalemia  Heart failure [facility])    Discharge Diagnoses:    Hospital Problems  Date Reviewed: 9/11/2017          Codes Class Noted POA    Failure to thrive in adult ICD-10-CM: R62.7  ICD-9-CM: 783.7  9/19/2017 Unknown        Lower obstructive uropathy ICD-10-CM: N13.9  ICD-9-CM: 599.60  9/18/2017 Unknown        Urinary retention ICD-10-CM: R33.9  ICD-9-CM: 788.20  9/18/2017 Unknown        Acute hyperactive delirium due to multiple etiologies ICD-10-CM: F05  ICD-9-CM: 293.0  9/15/2017 Unknown        Dysphagia ICD-10-CM: R13.10  ICD-9-CM: 787.20  9/13/2017 Unknown        Stridor ICD-10-CM: R06.1  ICD-9-CM: 786.1  9/11/2017 Unknown        Localized cancer of lip, oral cavity and throat ([facility] ICD-10-CM: C14.8  ICD-9-CM: 149.8  9/11/2017 Yes    Overview Signed 9/11/2017  1:48 AM by Colin Caceres MD     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 9/2017             Anxiety ICD-10-CM: F41.9  ICD-9-CM: 300.00  9/11/2017 Unknown        Heart failure ([facility] ICD-10-CM: I50.9  ICD-9-CM: 428.9  9/11/2017 Unknown        Acute renal failure superimposed on stage 4 chronic kidney disease ([facility] ICD-10-CM: N17.9, N18.4  ICD-9-CM: 584.9, 585.4  9/11/2017 Unknown        Hyperkalemia ICD-10-CM: E87.5  ICD-9-CM: 276.7  9/10/2017 Unknown        CKD (chronic kidney disease) stage 4, GFR 15-29 ml/min ([facility]) ICD-10-CM: N18.4  ICD-9-CM: 585.4  3/12/2017 Yes        PAM (acute kidney injury) ([facility] ICD-10-CM: N17.9  ICD-9-CM: 584.9  7/17/2016 Yes        DM (diabetes mellitus) ([facility] ICD-10-CM: E11.9  ICD-9-CM: 250.00  7/17/2016 Yes        * (Principal)Diastolic congestive heart failure ([facility] ICD-10-CM: I50.30  ICD-9-CM: 428.30, 428.0  7/16/2016 Unknown        Shortness of breath ICD-10-CM: R06.02  ICD-9-CM: 786.05  5/3/2015 Yes              Discharge Condition: [address]    Discharge Medications:     Current Discharge Medication List      START taking these medications    Details   bisacodyl (DULCOLAX) 10 mg suppository Insert 10 mg into rectum daily. Qty: 10 Each, Refills: 0      haloperidol (HALDOL) 2 mg/mL oral concentrate Take 1 mL by mouth every six (6) hours as needed. Qty: 30 mL, Refills: 0      oxyCODONE (ROXICODONE INTENSOL) 20 mg/mL concentrated solution Take 0.5 mL by mouth every one (1) hour as needed (pain or SOB). Max Daily Amount: 240 mg.  Qty: 30 mL, Refills: 0      ondansetron (ZOFRAN ODT) 4 mg disintegrating tablet Take 1 Tab by mouth every eight (8) hours as needed for Nausea. Qty: 20 Tab, Refills: 0         CONTINUE these medications which have CHANGED    Details   insulin glargine (LANTUS) 100 unit/mL injection 25 Units by SubCUTAneous route daily. Indications: type 2 diabetes mellitus  Qty: 1 Vial, Refills: 0         CONTINUE these medications which have NOT CHANGED    Details   furosemide (LASIX) 40 mg tablet Take 1 Tab by mouth daily for 20 days. Qty: 20 Tab, Refills: 0      nystatin-triamcinolone (MYCOLOG) 100,000-0.1 unit/gram-% ointment Apply  to affected area two (2) times a day. Qty: 30 g, Refills: 0      metoprolol tartrate (LOPRESSOR) 50 mg tablet Take 0.5 Tabs by mouth two (2) times a day. Qty: 10 Tab, Refills: 0      melatonin 3 mg tablet Take 3 mg by mouth.      miconazole (MICONAZOLE 7) 2 % vaginal cream Insert 1 Applicator into vagina nightly. calcium carbonate (HODAN-GEST ANTACID) 200 mg calcium (500 mg) chew Take 2 Tabs by mouth three (3) times daily. naphazoline-pheniramine (NAPHCON-A) 0.025-0.3 % ophthalmic solution Administer  to both eyes. clotrimazole-betamethasone (LOTRISONE) 1-0.05 % lotion Apply  to affected area two (2) times a day.       acetaminophen (TYLENOL) 325 mg tablet Take 650 mg by mouth three (3) times daily as needed for Pain.      polyethylene glycol (MIRALAX) 17 gram/dose powder Take 17 g by mouth as needed. pitavastatin (LIVALO) 2 mg tablet Take 1 mg by mouth daily. promethazine (PHENERGAN) 6.25 mg/5 mL syrup Take 6.25 mg by mouth four (4) times daily as needed for Nausea. cloNIDine HCl (CATAPRES) 0.2 mg tablet Take 1 Tab by mouth two (2) times a day. Qty: 40 Tab, Refills: 0      amlodipine (NORVASC) 5 mg tablet Take 10 mg by mouth daily. aspirin 81 mg tablet Take 81 mg by mouth daily. cholecalciferol, vitamin d3, (VITAMIN D) 1,000 unit tablet Take 1,000 Units by mouth daily. levothyroxine (SYNTHROID) 125 mcg tablet Take 150 mcg by mouth Daily (before breakfast). pyridoxine (VITAMIN B-6) 50 mg tablet Take 50 mg by mouth daily. atorvastatin (LIPITOR) 20 mg tablet Take  by mouth daily. pantoprazole (PROTONIX) 40 mg tablet Take 40 mg by mouth daily. clopidogrel (PLAVIX) 75 mg tablet Take 75 mg by mouth daily. isosorbide mononitrate ER (IMDUR) 30 mg tablet Take 30 mg by mouth two (2) times a day. nitroglycerin (NITROQUICK) 0.4 mg SL tablet 0.4 mg by SubLINGual route every five (5) minutes as needed.          STOP taking these medications       labetalol (NORMODYNE) 100 mg tablet Comments:   Reason for Stopping:         diphenhydrAMINE (BENADRYL) 25 mg tablet Comments:   Reason for Stopping:         loperamide (IMMODIUM) 2 mg tablet Comments:   Reason for Stopping:         meclizine (ANTIVERT) 12.5 mg tablet Comments:   Reason for Stopping:         traMADol (ULTRAM) 50 mg tablet Comments:   Reason for Stopping:         magnesium oxide (MAG-OX) 400 mg tablet Comments:   Reason for Stopping:         LORazepam (ATIVAN) 0.5 mg tablet Comments:   Reason for Stopping:         INSULIN ASPART (NOVOLOG SC) Comments:   Reason for Stopping:               Procedures : none     Consults: Nephrology      PHYSICAL EXAM   Visit Vitals    /65 (BP 1 Location: Left arm, BP Patient Position: At rest)    Pulse 89    Temp 98.4 °F (36.9 °C)    Resp 18    Ht 5' (1.524 m)    Wt 76 kg (167 lb 8 oz)    SpO2 98%    BMI 32.71 kg/m2     General: Awake, cooperative, no acute distress    HEENT: NC, mass under the chin, PERRLA, EOMI. Anicteric sclerae. Lungs:  CTA Bilaterally. No Wheezing/Rhonchi/Rales. Heart:  Regular  rhythm,  No murmur, No Rubs, No Gallops  Abdomen: Soft, Non distended, Non tender. +Bowel sounds,   Extremities: No c/c/e  Psych:   Anxious, no agitated. Neurologic:  No acute neurological deficits. Admission HPI :   Donavan Billy is a 80 y.o.  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 Frankie Trejo Receiving out patient radiation therapy her anxiety has wosened getting ativan at [facility] facility with minimal relief   In ER found to have worsening renal insufficency  And hyperkalemia renal consult called by ER doctor  Also found to have 600 cc of urinary retention upon insertion of tilley in ER       Hospital Course :   Since she was admitted, iv diuretics was given for chf exacerbation, renal on board for hyperkalemia and kathrnie on ckd4. Her sob got improving per diuretics and hyperkalemia resolved. 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 Iv steroid and iv abx administrated for the stridor and oncology was on board and recommended to hold radiation therapy. Speech was on board for her dysphagia and she had no appetite and refused to eat. Her renal function first got improved, then worsening. Tilley was inserted for urinary retention.  However, her renal function was not improving. She does not want HD and peg tube. Family meeting hold per palliative care, she does not want radiation treatment for her cancer/HD/feeding tube. She was alert and oriented while she made the decision. Her daughter, Ivana Barriga agrees with her decision. Activity: Activity as tolerated    Diet: Dysphagia diet-pureed    Follow-up: hospice physician     Disposition: hospice  In-pt     Minutes spent on discharge: 50 min       Labs: Results:       Chemistry Recent Labs      09/19/17   0453  09/18/17   0359  09/17/17   1505   GLU  176*  203*  198*   NA  137  140  140   K  3.5  3.4*  3.3*   CL  96*  98*  98*   CO2  31  32  36*   BUN  153*  128*  105*   CREA  4.79*  3.49*  2.58*   CA  8.7  9.7  9.8   AGAP  10  10  6   BUCR  32*  37*  41*   ALB  2.9*  3.2*   --       CBC w/Diff Recent Labs      09/18/17   1510   WBC  37.3*   RBC  5.40*   HGB  13.1   HCT  39.9   PLT  496*   GRANS  88*   LYMPH  8*   EOS  0      Cardiac Enzymes No results for input(s): CPK, CKND1, DORITA in the last 72 hours. No lab exists for component: CKRMB, TROIP   Coagulation No results for input(s): PTP, INR, APTT in the last 72 hours. No lab exists for component: INREXT    Lipid Panel Lab Results   Component Value Date/Time    Cholesterol, total 124 07/17/2016 02:21 AM    HDL Cholesterol 41 07/17/2016 02:21 AM    LDL, calculated 57.6 07/17/2016 02:21 AM    VLDL, calculated 25.4 07/17/2016 02:21 AM    Triglyceride 127 07/17/2016 02:21 AM    CHOL/HDL Ratio 3.0 07/17/2016 02:21 AM      BNP No results for input(s): BNPP in the last 72 hours.    Liver Enzymes Recent Labs      09/19/17   0453   ALB  2.9*      Thyroid Studies Lab Results   Component Value Date/Time    TSH 1.92 03/11/2017 04:41 AM            Significant Diagnostic Studies: Xr Chest Sngl V    Result Date: 9/4/2017  EXAM: AP radiograph of the chest INDICATION: Shortness of breath COMPARISON: March 10, 2017, July 16, 2016 _______________ FINDINGS: Normal heart size and mediastinal contour. No consolidation, pleural effusion, or pneumothorax. No acute osseous abnormalities. _______________     IMPRESSION: No acute pulmonary findings     Xr Abd (kub)    Result Date: 9/16/2017  EXAM: Single view abdomen x-ray INDICATION: Abdominal pain and vomiting. COMPARISON: 8/1/2017. TECHNIQUE: Supine view of the abdomen was performed. _______________ FINDINGS: Contrast material in the colon from recent barium swallow study prominent gas-filled loops of small bowel in the right hemiabdomen without evidence of yared obstruction. There are no suspicious calcifications in the abdomen or pelvis. Evaluation of free air is limited due to supine technique. Multilevel degenerative changes in the lumbar spine. _______________     IMPRESSION: Nonspecific gas-filled loops of small bowel without yared evidence of bowel obstruction. Xr Swallow Func Video    Result Date: 9/13/2017  Modified Barium swallow History: Dysphasia with cough, feeding difficulties. Technique: The patient orally ingested various barium materials under the direction of a speech pathologist with direct fluoroscopic observation. Total fluoroscopy time: 36 seconds; fluoroscopy dose: 4.37mGy reference in air kerma Findings: The patient ingested thin, pudding, and a barium coated pill consistencies. Laryngeal aspiration occurred with thin liquids administered by straw. The aspiration resulted in a cough reflex. Penetration to the vocal cords was noted with thin liquids administered by both cup and straw. Premature spillage was noted with any consistency. Oropharyngeal phase was unremarkable. IMPRESSION: 1. Aspiration with cough reflex related to thin liquids administered by straw. Laryngeal penetration to the level of the vocal cords noted within the contents of the cervical and strut.  Please refer to the separate report and recommendations from the speech pathologist.    Ct Head Wo Cont    Result Date: 9/17/2017  EXAM: CT head INDICATION: Altered mental status. COMPARISON: 8/1/2017 TECHNIQUE: Axial CT imaging of the head was performed without intravenous contrast. One or more dose reduction techniques were used on this CT: automated exposure control, adjustment of the mAs and/or kVp according to patient's size, and iterative reconstruction techniques. The specific techniques utilized on this CT exam have been documented in the patient's electronic medical record. _______________ FINDINGS: BRAIN AND POSTERIOR FOSSA: There is no intracranial hemorrhage, mass effect, or midline shift. There is a mild degree of sulcal enlargement and ventricular dilatation consistent with cortical atrophy. There is hypoattenuation of the periventricular white matter, which is nonspecific but most likely represents changes from chronic microangiopathy. EXTRA-AXIAL SPACES AND MENINGES: There are no abnormal extra-axial fluid collections. CALVARIUM: Intact. SINUSES: Clear. OTHER: None. _______________     IMPRESSION: 1. No acute intracranial process, specifically, no evidence of intracranial hemorrhage, mass effect, or midline shift. 2. Senescent changes of the brain including cortical atrophy and findings most suggestive of chronic microvascular ischemia. Please note that CT may be negative in the setting of acute infarction. Xr Chest Port    Result Date: 9/15/2017  History: CHF Comparison: 9/10/2017. Exam performed AP portable at 1542. Findings: There is relatively poor respiratory effort today. Cardiomegaly remains. Pulmonary vascularity appears normal. There is some subsegmental atelectasis right lower lobe new from prior. No effusion. Patient's chin obscures the apices. No significant pneumothorax. Impression: 1. The chest appears improved. Findings of congestive failure have reduced. 2. Minor atelectasis right lower lobe.      Xr Chest Port    Result Date: 9/11/2017  EXAM: Chest portable INDICATION: Short of breath COMPARISON: Single view chest 9/3/2017 _______________ FINDINGS: AP portable chest film was performed. Suboptimal inspiration similar to previous exam. There are new bilaterally symmetrically increased interstitial markings. No focal infiltrate. No definite effusion. No pneumothorax. Heart is at the upper limits of normal. Mild central vascular congestion. _______________     IMPRESSION: 1. New symmetrically increased interstitial infiltrates. Differential includes acute onset interstitial pulmonary edema which is felt to be most likely. Acute interstitial pneumonitis is felt to be less likely. Nm Lung Perfusion W Vent    Result Date: 9/11/2017  Ventilation-perfusion lung scan History:  Shortness of breath, acute in onset. Comparison:  Portable chest earlier the same day Technique: Ventilation imaging was performed after inhalation of 0.8 millicuries of Tc 96I DTPA with a nebulizer followed by imaging in multiple projections. Perfusion imaging was performed after intravenous injection of 7.2 millicuries of Tc 85P MAA followed by imaging in multiple projections. Injection site: Right antecubital fossa vein Findings: Ventilation imaging shows no significant ventilation defects. Perfusion imaging shows mild inhomogeneity along the peripheral aspect of the lungs. No focal segmental perfusion defect is seen. No perfusion mismatches are present. Impression: Low probability for pulmonary embolism without definite focal segmental perfusion defect.              [facility]     CC: Teresa Morrissey MD

## 2017-09-20 NOTE — DIABETES MGMT
GLYCEMIC CONTROL & NUTRITION:    - noted pt has elected for comfort measures at this time  - will comply with pt and family wishes, will continue to provide recommendations as requested  - glycemic control no longer a primary goal of care, basal & mealtime orders continue with goal of maintain BG <200 mg/dl    Recent Glucose Results:   Lab Results   Component Value Date/Time    GLUCPOC 124 (H) 09/20/2017 06:09 AM    GLUCPOC 140 (H) 09/19/2017 09:58 PM    GLUCPOC 178 (H) 09/19/2017 04:44 PM             Vincent Mariee, MPH, RD, CDE

## 2017-09-20 NOTE — ROUTINE PROCESS
Bedside and Verbal shift change report given to Mateo Florina (oncoming nurse) by April Ojeda (offgoing nurse). Report included the following information SBAR, Kardex and MAR.

## 2017-09-20 NOTE — ROUTINE PROCESS
Bedside and Verbal shift change report given to Roxy Bishop (oncoming nurse) by Nellie Govea (offgoing nurse). Report included the following information SBAR, Kardex, Intake/Output and MAR.

## 2017-09-20 NOTE — ROUTINE PROCESS
Report called to surinder Gan at [facility]. Opportunity for questions and call back number provided.

## 2017-09-20 NOTE — PROGRESS NOTES
Shift Summary:  Uneventful shift. Patient slept through the night  Patient denied pain or discomfort. BS this a.m. 124, no SS coverage needed.

## 2017-09-20 NOTE — PROGRESS NOTES
Palliative Medicine Progress Note  DR. FULLER[facility]S [facility]: [phone] ([phone])  [facility]: [address] [place] [address] Way: [phone]    Patient Name: Jaison Alfonso  YOB: 1932    Date of Initial Consult: 9/19/17  Reason for Consult: care decisions  Requesting Provider: Dr. Elieser Churchill   Primary Care Physician: Jazmin Barragan MD      SUMMARY:   Jaison Alfonso is a 80y.o. year old with a past history of anxiety, depression, diastolic CHF, CAD, CRI admitted with SOB. 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 Brennan Sweet, last on 9/8. This was interrupted by two admissions to [facility] for CP, SOB and hypertensive urgency in August. Current admission is fourth since March. She is being treated for pseudomonas UTI, has obstructive uropathy and progressively rising Cr, and has been unable to eat or drink in any significant quantity. Enteral feedings have been recommended. She has sharp perineal pain, bladder spasm and states she \"hurts all over\". She has been a resident of [place] for about three years. She has two children she is estranged from, has remained close to her adopted daughter. 9/20/17: Denies pain but complains of persistent nausea which keeps her from eating. Drinking fluids. Asks if a PEG would help her eat.      PALLIATIVE DIAGNOSES:     Patient Active Problem List   Diagnosis Code    Shortness of breath R06.02    Angina pectoris ([facility]) I20.9    CAD (coronary artery disease) I25.10    HTN (hypertension) I94    Diastolic congestive heart failure (HCC) I50.30    PAM (acute kidney injury) ([facility] N17.9    DM (diabetes mellitus) ([facility]) E11.9    Leukocytosis D72.829    Hyponatremia E87.1    CKD (chronic kidney disease) stage 4, GFR 15-29 ml/min ([facility]) N18.4    Hyperkalemia E87.5    Stridor R06.1    Localized cancer of lip, 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    Anxiety F41.9    Heart failure ([facility]) I50.9  Acute renal failure superimposed on stage 4 chronic kidney disease (HCC) N17.9, N18.4    Dysphagia R13.10    Acute hyperactive delirium due to multiple etiologies F05    Lower obstructive uropathy N13.9    Acute myocardial infarction (HCC) I21.3    Pulmonary nodule R91.1    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 PLAN:   1. Palliative team met with patient, her adopted daughter and BELEN. Reviewed her clinical course over the last year and the difficulties she has experienced since initiating treatment for her cancer. Answered questions regarding her progressive weakness, anorexia, nausea and pain and how these symptoms relate to her underlying malignancy, it's treatment, and her baseline functional status. Explained that if she felt like trying to complete her radiation, part of that care plan would require enteral feedings, continued aggressive hospital care which she has almost systematically refused, and the potential for dialysis should her renal failure continue to progress. In view of her course over the last several months, even with the most aggressive support, she is unlikely to regain enough function to complete her treatments and/or stay out of hospital. She says she knows she hasn't gotten any better since she's been in hospital and that \"everything you are doing to me makes me feel worse\". She is adamant that she does not want a feeding tube and would not consider dialysis.  Described elements of a comfort care plan supervised by hospice which we recommend if she doesn't choose to attempt further palliative rxt for her cancer. She has decided she doesn't want further aggressive care and is supported in this decision by her family. Hospice consult placed. She plans to return to [facility]. Oxycodone, clonopin and bowel regimen ordered. AMD designating daughter executed. Initial consult note routed to primary continuity provider    9/20/17: Reviewed [address] conversation from yesterday. Enteral feeding almost certainly would worsen her symptoms of nausea. Will treat sxs aggressively. Due for d/c with hospice to [facility].      2. Communicated plan of care with: Palliative IDT       GOALS OF CARE:     comfort    Advance Care Planning 9/19/2017   Patient's Healthcare Decision Maker is: Named in scanned ACP document   Primary Decision Maker Name Jennifer Suresh   Primary Decision Maker Phone Number [phone]   Primary Decision Maker Relationship to Patient Adult child   Secondary Decision Maker Name Rosario Acosta   Secondary Decision Maker Phone Number [phone]   Secondary Decision Maker Relationship to Patient Other relative   Confirm Advance Directive Yes, on file   Does the patient have other document types Do Not Resuscitate           HISTORY:     History obtained from: patient and family  CHIEF COMPLAINT: see summary    HPI/SUBJECTIVE:    The patient is:   [x] Verbal and participatory  [] Non-participatory due to:        Clinical Pain Assessment (nonverbal scale for nonverbal patients): Pain: 8         FUNCTIONAL ASSESSMENT:     Palliative Performance Scale (PPS):  PPS: 40       PSYCHOSOCIAL/SPIRITUAL SCREENING:     Advance Care Planning:  Advance Care Planning 9/19/2017   Patient's Healthcare Decision Maker is: Named in scanned ACP document   Primary Decision Maker Name Jennifer Suresh   Primary Decision Maker Phone Number [phone]   Primary Decision Maker Relationship to Patient Adult child   Secondary Decision Maker Name Javier Way. Phone Number [phone]   Secondary Decision Maker Relationship to Patient Other relative   Confirm Advance Directive Yes, on file   Does the patient have other document types Do Not Resuscitate        Any spiritual / [employer] concerns:  [] Yes /  [x] No    Caregiver Burnout:  [] Yes /  [x] No /  [] No Caregiver Present      Anticipatory grief assessment:   [x] Normal  / [] Maladaptive       ESAS Anxiety: Anxiety: 9    ESAS Depression: Depression: 8        REVIEW OF SYSTEMS:     Positive and pertinent negative findings in ROS are noted above in HPI. The following systems were [x] reviewed / [] unable to be reviewed as noted in HPI  Other findings are noted below. Systems: constitutional, ears/nose/mouth/throat, respiratory, gastrointestinal, genitourinary, musculoskeletal, integumentary, neurologic, psychiatric, endocrine. Positive findings noted below. Modified ESAS Completed by: provider   Fatigue: 8 Drowsiness: 5   Depression: 8 Pain: 8   Anxiety: 9 Nausea: 5   Anorexia: 8 Dyspnea: 5     Constipation: Yes     Stool Occurrence(s): 1     ECOG: 3     PHYSICAL EXAM:     Wt Readings from Last 3 Encounters:   09/20/17 76 kg (167 lb 8 oz)   09/03/17 91.8 kg (202 lb 6.4 oz)   07/31/17 82.6 kg (182 lb)     Blood pressure 188/65, pulse 89, temperature 98.4 °F (36.9 °C), resp. rate 18, height 5' (1.524 m), weight 76 kg (167 lb 8 oz), SpO2 98 %.   Pain:  Pain Scale 1: Visual  Pain Intensity 1: 0     Pain Location 1: Rectal  Pain Orientation 1: Posterior  Pain Description 1: Sharp  Pain Intervention(s) 1: Medication (see MAR)  Last bowel movement:     Constitutional: anxious, tearful at times  Eyes: pupils equal, anicteric  ENMT: no nasal discharge, moist mucous membranes  Cardiovascular: regular rhythm, distal pulses intact  Respiratory: breathing not labored, symmetric  Gastrointestinal: soft non-tender, +bowel sounds  Musculoskeletal: no deformity, no tenderness to palpation  Skin: warm, dry  Neurologic: following commands, moving all extremities  Psychiatric: full affect, no hallucinations. Anxious, but oriented x 3.  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       therapy 9/2017      Anxiety (9/11/2017)      Heart failure ([address] (9/11/2017)      Acute renal failure superimposed on stage 4 chronic kidney disease ([address] (9/11/2017)      Dysphagia (9/13/2017)      Acute hyperactive delirium due to multiple etiologies (9/15/2017)      Lower obstructive uropathy (9/18/2017)      Urinary retention (9/18/2017)      Failure to thrive in adult (9/19/2017)      Past Medical History:   Diagnosis Date    Anxiety     Arthritis     CAD (coronary artery disease)     Minor    Chest pain, unspecified 1/28/2011    Diabetes ([address] 1964    Adult onset for 39 years    Essential hypertension     Hiatal hernia     Hyperlipidemia     Hypothyroidism     Lower obstructive uropathy 3/77/2618    Systolic hypertension       Past Surgical History:   Procedure Laterality Date    CARDIAC SURG PROCEDURE UNLIST      three cardiac stents     HX CAROTID ENDARTERECTOMY      HX CHOLECYSTECTOMY      HX HYSTERECTOMY      NM RADIONUCLIDE ABLATION THERAPY        History reviewed. No pertinent family history. History reviewed, no pertinent family history.   Social History   Substance Use Topics    Smoking status: Former Smoker     Packs/day: 1.00    Smokeless tobacco: Never Used    Alcohol use No     No Known Allergies   Current Facility-Administered Medications   Medication Dose Route Frequency    promethazine (PHENERGAN) 25 mg in 0.9% sodium chloride 50 mL IVPB  25 mg IntraVENous Q4H PRN    insulin glargine (LANTUS) injection 25 Units  25 Units SubCUTAneous DAILY    insulin lispro (HUMALOG) injection 4 Units  4 Units SubCUTAneous TIDAC    acetaminophen (TYLENOL) tablet 650 mg  650 mg Oral QID    omeprazole (PRILOSEC) capsule 20 mg  20 mg Oral DAILY    amLODIPine (NORVASC) tablet 5 mg  5 mg Oral DAILY    haloperidol (HALDOL) 2 mg/mL oral solution 2 mg  2 mg SubLINGual Q6H PRN    Or    haloperidol lactate (HALDOL) injection 2 mg  2 mg IntraVENous Q6H PRN    senna-docusate (PERICOLACE) 8.6-50 mg per tablet 2 Tab  2 Tab Oral QHS    bisacodyl (DULCOLAX) suppository 10 mg  10 mg Rectal DAILY PRN    Sodium Phosphates (FLEET'S) enema 66 mL  1 Enema Rectal DAILY PRN    oxyCODONE (ROXICODONE INTENSOL) 20 mg/mL concentrated solution 10 mg  10 mg Oral Q1H PRN    clonazePAM (KlonoPIN) tablet 0.5 mg  0.5 mg Oral BID    metoprolol tartrate (LOPRESSOR) tablet 25 mg  25 mg Oral Q12H    pyridoxine (vitamin B6) (VITAMIN B-6) tablet 25 mg  25 mg Oral DAILY    albuterol-ipratropium (DUO-NEB) 2.5 MG-0.5 MG/3 ML  3 mL Nebulization Q4H PRN    mirtazapine (REMERON SOL-TAB) disintegrating tablet 30 mg  30 mg Oral QHS    aspirin delayed-release tablet 81 mg  81 mg Oral DAILY    cloNIDine HCl (CATAPRES) tablet 0.2 mg  0.2 mg Oral BID    clopidogrel (PLAVIX) tablet 75 mg  75 mg Oral DAILY    clotrimazole-betamethasone (LOTRISONE) 1-0.05 % cream   Topical BID    isosorbide mononitrate ER (IMDUR) tablet 30 mg  30 mg Oral BID    levothyroxine (SYNTHROID) tablet 150 mcg  150 mcg Oral ACB    nitroglycerin (NITROSTAT) tablet 0.4 mg  0.4 mg SubLINGual PRN    polyethylene glycol (MIRALAX) packet 17 g  17 g Oral PRN        LAB AND IMAGING FINDINGS:     Lab Results   Component Value Date/Time    WBC 37.3 09/18/2017 03:10 PM    HGB 13.1 09/18/2017 03:10 PM    PLATELET 304 43/93/5430 03:10 PM     Lab Results   Component Value Date/Time    Sodium 137 09/19/2017 04:53 AM    Potassium 3.5 09/19/2017 04:53 AM    Chloride 96 09/19/2017 04:53 AM    CO2 31 09/19/2017 04:53 AM     09/19/2017 04:53 AM    Creatinine 4.79 09/19/2017 04:53 AM    Calcium 8.7 09/19/2017 04:53 AM    Magnesium 3.0 09/19/2017 04:53 AM    Phosphorus 5.4 09/19/2017 04:53 AM      Lab Results   Component Value Date/Time    AST (SGOT) 11 09/12/2017 05:46 AM    Alk.  phosphatase 85 09/12/2017 05:46 AM    Protein, total 7.2 09/12/2017 05:46 AM    Albumin 2.9 09/19/2017 04:53 AM    Globulin 4.2 09/12/2017 05:46 AM     Lab Results   Component Value Date/Time    INR 0.9 03/12/2017 05:15 AM    Prothrombin time 11.8 03/12/2017 05:15 AM    aPTT 29.2 03/12/2017 05:15 AM      No results found for: IRON, FE, TIBC, IBCT, PSAT, FERR   No results found for: PH, PCO2, PO2  No components found for: [place]   Lab Results   Component Value Date/Time    CK 64 09/11/2017 05:30 PM    CK - MB 1.5 09/11/2017 05:30 PM              Total time: 25 min  Counseling / coordination time, spent as noted above: 20  > 50% counseling / coordination      Sumit Sunshine MD  Palliative Medicine

## 2017-09-20 NOTE — PROGRESS NOTES
D/c plan: anticipate [facility] to [facility] discharged to [facility] will admit when she arrives    Telephone call with [facility] was arranged to be delivered at 1000 am. Juan Garcia wants to continue with transportation for 11am.    RN please call report to [address] [place] [address]) [phone]   and to Juan Garcia RN at [facility] [phone]. Rn please send patients dentures with patient. RN please send patient to [facility] @  [address], [facility], [address] [place]      Please include all hard scripts for controlled substances, med rec and dc summary in packet. Please medicate for pain prior to dc if possible and needed to help offset delay when patient first arrives to facility. Care Management Interventions  PCP Verified by CM: Yes  Mode of Transport at Discharge: [address]  [phone] Time of Discharge: 1100  Transition of Care Consult (CM Consult): Other ([facility])  Partner SNF: Yes  Physical Therapy Consult: Yes  Occupational Therapy Consult: Yes  Current Support Network:  Other  Confirm Follow Up Transport: Family  Plan discussed with Pt/Family/Caregiver: Yes  Freedom of Choice Offered: Yes  Discharge Location  Discharge Placement: Other: ([facility])

## 2024-05-26 NOTE — ED NOTES
Patient sleeping     Pt discharged home stable via stretcher. Pain level at discharge 9. Pt discharged with EMT. Reviewed discharged instructions with patient and Lindsay Campo at [facility] who verbalized understanding.   Patient armband removed and shredded